# Patient Record
Sex: MALE | Race: WHITE | Employment: OTHER | URBAN - METROPOLITAN AREA
[De-identification: names, ages, dates, MRNs, and addresses within clinical notes are randomized per-mention and may not be internally consistent; named-entity substitution may affect disease eponyms.]

---

## 2018-02-01 ENCOUNTER — OFFICE VISIT (OUTPATIENT)
Dept: FAMILY MEDICINE CLINIC | Age: 33
End: 2018-02-01

## 2018-02-01 DIAGNOSIS — I16.0 HYPERTENSIVE URGENCY: Primary | ICD-10-CM

## 2018-02-01 DIAGNOSIS — H35.62 RETINAL HEMORRHAGE OF LEFT EYE: ICD-10-CM

## 2018-02-01 DIAGNOSIS — E66.01 OBESITY, MORBID (HCC): ICD-10-CM

## 2018-02-01 NOTE — PATIENT INSTRUCTIONS
High Blood Pressure: Care Instructions  Your Care Instructions    If your blood pressure is usually above 140/90, you have high blood pressure, or hypertension. That means the top number is 140 or higher or the bottom number is 90 or higher, or both. Despite what a lot of people think, high blood pressure usually doesn't cause headaches or make you feel dizzy or lightheaded. It usually has no symptoms. But it does increase your risk for heart attack, stroke, and kidney or eye damage. The higher your blood pressure, the more your risk increases. Your doctor will give you a goal for your blood pressure. Your goal will be based on your health and your age. An example of a goal is to keep your blood pressure below 140/90. Lifestyle changes, such as eating healthy and being active, are always important to help lower blood pressure. You might also take medicine to reach your blood pressure goal.  Follow-up care is a key part of your treatment and safety. Be sure to make and go to all appointments, and call your doctor if you are having problems. It's also a good idea to know your test results and keep a list of the medicines you take. How can you care for yourself at home? Medical treatment  · If you stop taking your medicine, your blood pressure will go back up. You may take one or more types of medicine to lower your blood pressure. Be safe with medicines. Take your medicine exactly as prescribed. Call your doctor if you think you are having a problem with your medicine. · Talk to your doctor before you start taking aspirin every day. Aspirin can help certain people lower their risk of a heart attack or stroke. But taking aspirin isn't right for everyone, because it can cause serious bleeding. · See your doctor regularly. You may need to see the doctor more often at first or until your blood pressure comes down.   · If you are taking blood pressure medicine, talk to your doctor before you take decongestants or anti-inflammatory medicine, such as ibuprofen. Some of these medicines can raise blood pressure. · Learn how to check your blood pressure at home. Lifestyle changes  · Stay at a healthy weight. This is especially important if you put on weight around the waist. Losing even 10 pounds can help you lower your blood pressure. · If your doctor recommends it, get more exercise. Walking is a good choice. Bit by bit, increase the amount you walk every day. Try for at least 30 minutes on most days of the week. You also may want to swim, bike, or do other activities. · Avoid or limit alcohol. Talk to your doctor about whether you can drink any alcohol. · Try to limit how much sodium you eat to less than 2,300 milligrams (mg) a day. Your doctor may ask you to try to eat less than 1,500 mg a day. · Eat plenty of fruits (such as bananas and oranges), vegetables, legumes, whole grains, and low-fat dairy products. · Lower the amount of saturated fat in your diet. Saturated fat is found in animal products such as milk, cheese, and meat. Limiting these foods may help you lose weight and also lower your risk for heart disease. · Do not smoke. Smoking increases your risk for heart attack and stroke. If you need help quitting, talk to your doctor about stop-smoking programs and medicines. These can increase your chances of quitting for good. When should you call for help? Call 911 anytime you think you may need emergency care. This may mean having symptoms that suggest that your blood pressure is causing a serious heart or blood vessel problem. Your blood pressure may be over 180/110. ? For example, call 911 if:  ? · You have symptoms of a heart attack. These may include:  ¨ Chest pain or pressure, or a strange feeling in the chest.  ¨ Sweating. ¨ Shortness of breath. ¨ Nausea or vomiting.   ¨ Pain, pressure, or a strange feeling in the back, neck, jaw, or upper belly or in one or both shoulders or arms.  ¨ Lightheadedness or sudden weakness. ¨ A fast or irregular heartbeat. ? · You have symptoms of a stroke. These may include:  ¨ Sudden numbness, tingling, weakness, or loss of movement in your face, arm, or leg, especially on only one side of your body. ¨ Sudden vision changes. ¨ Sudden trouble speaking. ¨ Sudden confusion or trouble understanding simple statements. ¨ Sudden problems with walking or balance. ¨ A sudden, severe headache that is different from past headaches. ? · You have severe back or belly pain. ?Do not wait until your blood pressure comes down on its own. Get help right away. ?Call your doctor now or seek immediate care if:  ? · Your blood pressure is much higher than normal (such as 180/110 or higher), but you don't have symptoms. ? · You think high blood pressure is causing symptoms, such as:  ¨ Severe headache. ¨ Blurry vision. ? Watch closely for changes in your health, and be sure to contact your doctor if:  ? · Your blood pressure measures 140/90 or higher at least 2 times. That means the top number is 140 or higher or the bottom number is 90 or higher, or both. ? · You think you may be having side effects from your blood pressure medicine. ? · Your blood pressure is usually normal, but it goes above normal at least 2 times. Where can you learn more? Go to http://tony-bertram.info/. Enter Q477 in the search box to learn more about \"High Blood Pressure: Care Instructions. \"  Current as of: September 21, 2016  Content Version: 11.4  © 5784-9582 [employer]. Care instructions adapted under license by [employer] (which disclaims liability or warranty for this information). If you have questions about a medical condition or this instruction, always ask your healthcare professional. [place] [address] any warranty or liability for your use of this information.

## 2018-02-01 NOTE — PROGRESS NOTES
Subjective:   Radha Diop is a 28 y.o. male here for an acute visit for    Chief Complaint   Patient presents with    New Patient     *Establish Care    Blood Pressure Check       HPI      Onset[de-identified] headache first started on Friday after waking up from a nap, at the same time noticed a \"spot\" in his left eye  Location: the headache is localized around his eyes  Duration: the headache has been intermittent, but the \"spot\" is always there   Characteristics: describes as \"just there\", rates 4-5/10  Aggravating: nothing   Reliving: nothing  Treatment: nothing  Pertinent PMH: obesety, positive snoreing, family hx of hypertension: father around 36 takes med, mom htn in her  52's  Pertinent negatives: chest pain/pressure, double or blurred vision, shortness of breath, difficulty breathing, waking up with a gasp when sleeping, headache upon awakening, weakness    ROS  As above, the rest are negative       Family History   Problem Relation Age of Onset    Hypertension Mother 48    Diabetes Father     Hypertension Father 36         Patient Active Problem List   Diagnosis Code    Obesity, morbid ([facility] E66.01    Hypertensive emergency I16.1    Hypertensive intracerebral hemorrhage ([facility] I61.9       No Known Allergies    Social History     Social History    Marital status:      Spouse name: N/A    Number of children: N/A    Years of education: N/A     Occupational History    Not on file.      Social History Main Topics    Smoking status: Never Smoker    Smokeless tobacco: Never Used    Alcohol use Yes      Comment: Occaisionally    Drug use: No    Sexual activity: Yes     Partners: Female     Other Topics Concern    Not on file     Social History Narrative    No narrative on file     Objective:     Vitals:    02/01/18 1447 02/01/18 1455 02/01/18 1510 02/01/18 1524   BP: (!) 207/159 (!) 196/150 (!) 194/130 (!) 180/140   Pulse: (!) 107 (!) 102     Resp: 17      Temp: 97.8 °F (36.6 °C)      TempSrc: Oral Weight: 308 lb (139.7 kg)      Height: 5' 8\" (1.727 m)         Body mass index is 46.83 kg/(m^2). Appearance: alert, well appearing, and in no distress, oriented to person, place, and time, overweight and acyanotic, in no respiratory distress, provides all information, no  used. ENT : Just came from opthalmology, pupils dilated bilaterally  Lungs are clear, good air entry, no wheezes, rhonchi or rales. S1 and S2 normal, no murmurs, regular rate and rhythm. Abdomen soft without tenderness, guarding, mass or organomegaly. No bruit. Extremities show no edema, normal peripheral pulses. Neurological is normal, no focal findings. Complete neuro examine performed  Head: normocephalic, atraumatic  Skin: no bruising, petechia, ecchymosis, trauma    Labwork and Ancillary Studies:    CBC w/Diff   No results found for: WBC, WBCLT, HGB, HGBP, PLT, HGBEXT, PLTEXT, HGBEXT, PLTEXT      Basic Metabolic Profile  No results found for: NA, K, CL, CO2, AGAP, GLU, BUN, CREA, BUCR, GFRAA, GFRNA, CA, GFRAA     Cholesterol  No results found for: CHOL, CHOLX, CHLST, CHOLV, HDL, LDL, LDLC, DLDLP, TGLX, TRIGL, TRIGP, CHHD, CHHDX       Assessment/Plan:    Diagnoses and all orders for this visit:    1. Hypertensive urgency  -     AMB POC EKG ROUTINE W/ 12 LEADS, INTER & REP  -I personally reviewed and interpreted the EKG, no ST/T wave changes, sinus tachycardia    2. Retinal hemorrhage of left eye  -found at the opthalmology office    3. Obesity, morbid ([address]    -In the setting of today's visit diet and exercise counseling was not done, however, Mr. Jerrell Grove recognizes the need to lose weight      ICD-10-CM ICD-9-CM    1. Hypertensive urgency I16.0 401.9 AMB POC EKG ROUTINE W/ 12 LEADS, INTER & REP   2. Retinal hemorrhage of left eye H35.62 362.81    3.  Obesity, morbid ([address] E66.01 278.01       Mr. Jerrell Grove comes to the office from the opthalmologic office where he was found to have retinal hemorrhaging which the opthalmologic attributed to hypertension. Mr. Chantale Pike was examined completley and a 12 lead EKG was done. I feel he is safe to transport himself to the ED at this time. Called the nearest ED, [facility] [place], and spoke with Dr. Shanti Blunt, informed him I was sending pt. over and gave a brief report. Mr. Chantale Pike was transported to the ED by his wife who came to pick him up within 5 minutes. Return to clinic if sxs persist, to ER if sxs worsen    Patient verbalized understanding to above instructions. AVS printed and given to pt. Mariama Simpson, ROBERTO-BC  [facility]   [address] [facility] [address]  [id number]

## 2018-02-01 NOTE — MR AVS SNAPSHOT
[facility] [place] [address] [facility] [address] [facility] [address] [id number] 
[phone] Patient: Jana Verdugo MRN: [id number] :1985 Visit Information Date & Time Provider Department Dept. Phone Encounter #  
 2018  2:45 PM Nikki Minna, [address] [place] [address] [phone] [id number] Follow-up Instructions Return in about 1 week (around 2018), or if symptoms worsen or fail to improve. Upcoming Health Maintenance Date Due DTaP/Tdap/Td series (1 - Tdap) 2006 Influenza Age 5 to Adult 2017 Allergies as of 2018  Review Complete On: 2018 By: Lizzeth Thurston LPN No Known Allergies Current Immunizations  Reviewed on 2018 No immunizations on file. Reviewed by Lizzeth Thurston LPN on 6389 at  3:70 PM  
You Were Diagnosed With   
  
 Codes Comments Hypertensive urgency    -  Primary ICD-10-CM: I16.0 ICD-9-CM: 401.9 Essential hypertension     ICD-10-CM: I10 
ICD-9-CM: 401.9 Vitals BP Pulse Temp Resp Height(growth percentile) Weight(growth percentile) (!) 196/150 (!) 102 97.8 °F (36.6 °C) (Oral) 17 5' 8\" (1.727 m) 308 lb (139.7 kg) BMI Smoking Status 46.83 kg/m2 Never Smoker Vitals History BMI and BSA Data Body Mass Index Body Surface Area  
 46.83 kg/m 2 2.59 m 2 Your Updated Medication List  
  
Notice  As of 2018  3:18 PM  
 You have not been prescribed any medications. We Performed the Following AMB POC EKG ROUTINE W/ 12 LEADS, INTER & REP [60426 CPT(R)] Follow-up Instructions Return in about 1 week (around 2018), or if symptoms worsen or fail to improve. Patient Instructions High Blood Pressure: Care Instructions Your Care Instructions If your blood pressure is usually above 140/90, you have high blood pressure, or hypertension. That means the top number is 140 or higher or the bottom number is 90 or higher, or both. Despite what a lot of people think, high blood pressure usually doesn't cause headaches or make you feel dizzy or lightheaded. It usually has no symptoms. But it does increase your risk for heart attack, stroke, and kidney or eye damage. The higher your blood pressure, the more your risk increases. Your doctor will give you a goal for your blood pressure. Your goal will be based on your health and your age. An example of a goal is to keep your blood pressure below 140/90. Lifestyle changes, such as eating healthy and being active, are always important to help lower blood pressure. You might also take medicine to reach your blood pressure goal. 
Follow-up care is a key part of your treatment and safety. Be sure to make and go to all appointments, and call your doctor if you are having problems. It's also a good idea to know your test results and keep a list of the medicines you take. How can you care for yourself at home? Medical treatment · If you stop taking your medicine, your blood pressure will go back up. You may take one or more types of medicine to lower your blood pressure. Be safe with medicines. Take your medicine exactly as prescribed. Call your doctor if you think you are having a problem with your medicine. · Talk to your doctor before you start taking aspirin every day. Aspirin can help certain people lower their risk of a heart attack or stroke. But taking aspirin isn't right for everyone, because it can cause serious bleeding. · See your doctor regularly. You may need to see the doctor more often at first or until your blood pressure comes down. · If you are taking blood pressure medicine, talk to your doctor before you take decongestants or anti-inflammatory medicine, such as ibuprofen. Some of these medicines can raise blood pressure. · Learn how to check your blood pressure at home. Lifestyle changes · Stay at a healthy weight. This is especially important if you put on weight around the waist. Losing even 10 pounds can help you lower your blood pressure. · If your doctor recommends it, get more exercise. Walking is a good choice. Bit by bit, increase the amount you walk every day. Try for at least 30 minutes on most days of the week. You also may want to swim, bike, or do other activities. · Avoid or limit alcohol. Talk to your doctor about whether you can drink any alcohol. · Try to limit how much sodium you eat to less than 2,300 milligrams (mg) a day. Your doctor may ask you to try to eat less than 1,500 mg a day. · Eat plenty of fruits (such as bananas and oranges), vegetables, legumes, whole grains, and low-fat dairy products. · Lower the amount of saturated fat in your diet. Saturated fat is found in animal products such as milk, cheese, and meat. Limiting these foods may help you lose weight and also lower your risk for heart disease. · Do not smoke. Smoking increases your risk for heart attack and stroke. If you need help quitting, talk to your doctor about stop-smoking programs and medicines. These can increase your chances of quitting for good. When should you call for help? Call 911 anytime you think you may need emergency care. This may mean having symptoms that suggest that your blood pressure is causing a serious heart or blood vessel problem. Your blood pressure may be over 180/110. ? For example, call 911 if: 
? · You have symptoms of a heart attack. These may include: ¨ Chest pain or pressure, or a strange feeling in the chest. 
¨ Sweating. ¨ Shortness of breath. ¨ Nausea or vomiting. ¨ Pain, pressure, or a strange feeling in the back, neck, jaw, or upper belly or in one or both shoulders or arms. ¨ Lightheadedness or sudden weakness. ¨ A fast or irregular heartbeat. ? · You have symptoms of a stroke. These may include: 
¨ Sudden numbness, tingling, weakness, or loss of movement in your face, arm, or leg, especially on only one side of your body. ¨ Sudden vision changes. ¨ Sudden trouble speaking. ¨ Sudden confusion or trouble understanding simple statements. ¨ Sudden problems with walking or balance. ¨ A sudden, severe headache that is different from past headaches. ? · You have severe back or belly pain. ?Do not wait until your blood pressure comes down on its own. Get help right away. ?Call your doctor now or seek immediate care if: 
? · Your blood pressure is much higher than normal (such as 180/110 or higher), but you don't have symptoms. ? · You think high blood pressure is causing symptoms, such as: ¨ Severe headache. ¨ Blurry vision. ? Watch closely for changes in your health, and be sure to contact your doctor if: 
? · Your blood pressure measures 140/90 or higher at least 2 times. That means the top number is 140 or higher or the bottom number is 90 or higher, or both. ? · You think you may be having side effects from your blood pressure medicine. ? · Your blood pressure is usually normal, but it goes above normal at least 2 times. Where can you learn more? Go to http://tony-bertram.info/. Enter X695 in the search box to learn more about \"High Blood Pressure: Care Instructions. \" Current as of: September 21, 2016 Content Version: 11.4 © 1447-0878 [employer]. Care instructions adapted under license by [employer] (which disclaims liability or warranty for this information). If you have questions about a medical condition or this instruction, always ask your healthcare professional. [place] [address] any warranty or liability for your use of this information. Introducing [facility]!    
 [address] introduces [employer] Virtual patient portal. Now you can access parts of your medical record, email your doctor's office, and request medication refills online. 1. In your internet browser, go to [email or web address] 2. Click on the First Time User? Click Here link in the Sign In box. You will see the New Member Sign Up page. 3. Enter your [employer] Access Code exactly as it appears below. You will not need to use this code after youve completed the sign-up process. If you do not sign up before the expiration date, you must request a new code. · [employer] Access Code: [email or web address] Expires: 5/2/2018  3:18 PM 
 
4. Enter the last four digits of your Social Security Number (xxxx) and Date of Birth (mm/dd/yyyy) as indicated and click Submit. You will be taken to the next sign-up page. 5. Create a [employer] ID. This will be your [employer] login ID and cannot be changed, so think of one that is secure and easy to remember. 6. Create a [employer] password. You can change your password at any time. 7. Enter your Password Reset Question and Answer. This can be used at a later time if you forget your password. 8. Enter your e-mail address. You will receive e-mail notification when new information is available in [address] 9. Click Sign Up. You can now view and download portions of your medical record. 10. Click the Download Summary menu link to download a portable copy of your medical information. If you have questions, please visit the Frequently Asked Questions section of the [employer] website. Remember, [employer] is NOT to be used for urgent needs. For medical emergencies, dial 911. Now available from your iPhone and Android! Please provide this summary of care documentation to your next provider. Your primary care clinician is listed as Capron Tanya. If you have any questions after today's visit, please call [phone].

## 2018-02-01 NOTE — PROGRESS NOTES
New patient patient presents to establish care. He reports that the ophthalmologist  downstairs was highly concerned about his blood pressure and recommended that he come up to our office to establish care.

## 2018-02-02 VITALS
SYSTOLIC BLOOD PRESSURE: 180 MMHG | RESPIRATION RATE: 17 BRPM | HEART RATE: 102 BPM | HEIGHT: 68 IN | DIASTOLIC BLOOD PRESSURE: 140 MMHG | BODY MASS INDEX: 46.68 KG/M2 | TEMPERATURE: 97.8 F | WEIGHT: 308 LBS

## 2018-02-02 PROBLEM — I61.9 HYPERTENSIVE INTRACEREBRAL HEMORRHAGE (HCC): Status: ACTIVE | Noted: 2018-02-01

## 2018-02-02 PROBLEM — I16.1 HYPERTENSIVE EMERGENCY: Status: ACTIVE | Noted: 2018-02-01

## 2018-02-07 ENCOUNTER — TELEPHONE (OUTPATIENT)
Dept: FAMILY MEDICINE CLINIC | Age: 33
End: 2018-02-07

## 2018-02-07 NOTE — TELEPHONE ENCOUNTER
Left message on voicemail:  Called to see if he would keep appointment on 2/8. Informed him I did want to follow up with an appointment since he was hospitalized.

## 2018-02-08 ENCOUNTER — OFFICE VISIT (OUTPATIENT)
Dept: FAMILY MEDICINE CLINIC | Age: 33
End: 2018-02-08

## 2018-02-08 VITALS
TEMPERATURE: 96.7 F | DIASTOLIC BLOOD PRESSURE: 109 MMHG | HEIGHT: 68 IN | SYSTOLIC BLOOD PRESSURE: 156 MMHG | RESPIRATION RATE: 17 BRPM | BODY MASS INDEX: 45.16 KG/M2 | HEART RATE: 76 BPM | WEIGHT: 298 LBS

## 2018-02-08 DIAGNOSIS — E66.01 OBESITY, MORBID (HCC): ICD-10-CM

## 2018-02-08 DIAGNOSIS — I61.9 HYPERTENSIVE INTRACEREBRAL HEMORRHAGE (HCC): ICD-10-CM

## 2018-02-08 DIAGNOSIS — Z86.69: ICD-10-CM

## 2018-02-08 DIAGNOSIS — E11.9 TYPE 2 DIABETES MELLITUS WITHOUT COMPLICATION, WITHOUT LONG-TERM CURRENT USE OF INSULIN (HCC): ICD-10-CM

## 2018-02-08 DIAGNOSIS — I10 ESSENTIAL HYPERTENSION: Primary | ICD-10-CM

## 2018-02-08 DIAGNOSIS — R51.9 ACUTE NONINTRACTABLE HEADACHE, UNSPECIFIED HEADACHE TYPE: ICD-10-CM

## 2018-02-08 DIAGNOSIS — I16.1 HYPERTENSIVE EMERGENCY: ICD-10-CM

## 2018-02-08 LAB — HBA1C MFR BLD HPLC: 5.9 %

## 2018-02-08 RX ORDER — LISINOPRIL 10 MG/1
10 TABLET ORAL DAILY
Qty: 30 TAB | Refills: 0 | Status: SHIPPED | OUTPATIENT
Start: 2018-02-08 | End: 2018-02-15 | Stop reason: SDUPTHER

## 2018-02-08 RX ORDER — AMLODIPINE BESYLATE 10 MG/1
10 TABLET ORAL DAILY
COMMUNITY
Start: 2018-02-06 | End: 2018-02-15 | Stop reason: SDUPTHER

## 2018-02-08 RX ORDER — METOPROLOL TARTRATE 50 MG/1
50 TABLET ORAL 3 TIMES DAILY
COMMUNITY
Start: 2018-02-05 | End: 2018-02-15 | Stop reason: SDUPTHER

## 2018-02-08 NOTE — PROGRESS NOTES
Tristian Hook is a 28 y.o. male and presents with Hospital Follow Up (*Patient ICU for three days)       Subjective:    Discharged from the hospital on 2/5/18. States he feels good. Has been taking his medications as prescribed and monitoring his blood pressure at home. Feels the Lopressor wears off prior to the eight hour time frame, states at about the six hour keisha his blood pressure goes up. States he is motivated to get into shape, exercise and lose weight. ROS:  Constitutional: No recent weight change. No weakness/fatigue. No fever or chills   Skin: No rashes, change in nails/hair, itching   HENT: No HA, dizziness. No hearing loss/tinnitus. No nasal congestion/discharge. Eyes: No change in vision, double/blurred vision or eye pain/redness. Cardiovascular: No CP/palpitations. No MCCANN/orthopnea/PND. Respiratory: No cough/sputum, dyspnea, wheezing. Neurological: No seizures/numbness/weakness. No paresthesias. Psychiatric:  No depression, anxiety. The problem list was updated as a part of today's visit. Patient Active Problem List   Diagnosis Code    Obesity, morbid ([facility] E66.01    Hypertensive intracerebral hemorrhage ([facility] [facility] I61.9    Hypertension I10    Headache R51    Hx of retinal hemorrhage Z86.69       The PSH, FH were reviewed. SH:  Social History   Substance Use Topics    Smoking status: Never Smoker    Smokeless tobacco: Never Used    Alcohol use Yes      Comment: Occaisionally         Medications/Allergies:  No current outpatient prescriptions on file prior to visit. No current facility-administered medications on file prior to visit. No Known Allergies    Objective:  Visit Vitals    BP (!) 156/109    Pulse 76    Temp 96.7 °F (35.9 °C) (Oral)    Resp 17    Ht 5' 8\" (1.727 m)    Wt 298 lb (135.2 kg)    BMI 45.31 kg/m2    Body mass index is 45.31 kg/(m^2).       Constitutional: Well developed, nourished, no distress, alert, obese habitus, dressed appropriately for weather, good historian, no  used   Eyes: Conjunctiva normal. PERRL. CV: S1, S2.  RRR. No murmurs/rubs. No thrills palpated. Pulm: No abnormalities on inspection. Clear to auscultation bilaterally. No wheezing/rhonchi. Normal effort. Neuro: A/O x 3.  CN 2-12 intact. No focal motor or sensory deficits. Speech normal.   Skin: No lesions/rashes on inspection. Psych: Appropriate affect, judgement and insight. Short-term memory intact. Labwork and Ancillary Studies:    Labs Drawn in [address]   02/05/18  0318   GLUCOSE 102*   BUN 13   CALCIUM 8.8   CREAT 0.9      POTASSIUM 3.8   CHLORIDE 101   CO2 23     Recent Labs   02/05/18  0318   WBC 14.2*   RBC 5.28   HEMOGLOBIN 15.0   HCT 44.3   MCV 84   MCH 28   MCHC 34   PLATELET 910   RDW 01.1     TSH 5.75  Free T4 1.3    Hospitalization Summary  2/1/18 - 2/5/18  Icu x 3 days on Cardene drip    CT scan of Head: Supratentorial, right caudate parenchymal hemorrhage, extimated volume 3cm, no mass effect or midline shift, unchanged throughout hospitalization    CT chest w/ contrast: Hyperinflation of lungs with marked elevation of right hemidiaphragm and right basilar atelectasis, otherwise unremarkable    2D echochardiogram: Negative bubble study, mild concentric left ventricular hypertrophy, normal global left ventricular systolic function , visually estimated EF 60%, no other abnormalities    CTA head: no cause identified for right caudate hemorrhage, probably hypertensive hemorrhage    CTA neck: all vessels patent    MR head w/wo contrast: focal acute hemorrhage in right caudothalamic angle area with surrounding mild edema, mild chronic microvascular ischemic changes in bilateral white matter, likely secondary to hypertension    ECG: normal sinus rhythm      Assessment/Plan:    Diagnoses and all orders for this visit:    1. Essential hypertension  -     HEMOGLOBIN A1C WITH EAG;  Future  -     lisinopril (PRINIVIL, ZESTRIL) 10 mg tablet; Take 1 Tab by mouth daily.  -     REFERRAL TO WEIGHT LOSS  -     AMB POC HEMOGLOBIN A1C    -Blood pressure still elevated 156/109  -Avoid rapid decrease in blood pressure, goal at this time is less than 160/90  -Current regimen from hospital: Amlodipine 10mg daily, Lopressor 50mg Q8H  -Add lisinopril and try to taper off Lopressor    2. Type 2 diabetes mellitus without complication, without long-term current use of insulin (HCC)  -     HEMOGLOBIN A1C WITH EAG; Future  -     lisinopril (PRINIVIL, ZESTRIL) 10 mg tablet; Take 1 Tab by mouth daily.  -     REFERRAL TO WEIGHT LOSS  -     AMB POC HEMOGLOBIN A1C    Several of his glucose checks in the hospital were elevated. Check A1c: 5.9    3. Obesity, morbid ([address]  -     HEMOGLOBIN A1C WITH EAG; Future  -     lisinopril (PRINIVIL, ZESTRIL) 10 mg tablet; Take 1 Tab by mouth daily.  -     REFERRAL TO WEIGHT LOSS  -     AMB POC HEMOGLOBIN A1C    Very motivated to lose weight. Referral to weight loss program, he is not sure if he would like to go this route but is open to getting more information. States [facility]  States he received much education in the hospital on diet changes and weight loss    4. Hypertensive emergency  Assessment & Plan:  Stable, based on history, physical exam and review of pertinent labs, studies and medications; meds reconciled; changes to treatment plan as per orders. 5. Hypertensive intracerebral hemorrhage (HCC)  Assessment & Plan:  Stable, based on history, physical exam and review of pertinent labs, studies and medications; meds reconciled; continue current treatment plan. Key Neurological Meds     The patient is on no neurologic meds. See hospitalization summary above. 6. Acute nonintractable headache, unspecified headache type    Resolved with blood pressure control    7. Hx of retinal hemorrhage    Related to elevated hypertension. Stable at this time. Health Maintenance:   Health Maintenance   Topic Date Due    DTaP/Tdap/Td series (1 - Tdap) 12/29/2006    Influenza Age 5 to Adult  08/01/2017       No orders of the defined types were placed in this encounter. All diagnosis have been discussed with the patient and all of the patient's questions have been answered. An After Visit Summary was printed and given to the patient. Follow-up Disposition:  Return in about 1 week (around 2/15/2018), or if symptoms worsen or fail to improve. [facility] Lalo, AGNP-BC  [address] [facility] [address]  [id number]

## 2018-02-08 NOTE — MR AVS SNAPSHOT
[facility] [place] [address] [facility] [address] [facility] [address] [id number] 
[phone] Patient: Yuriy Sharma MRN: [id number] :1985 Visit Information Date & Time Provider Department Dept. Phone Encounter #  
 2018  9:00 AM Stacie Larkin, [address] [facility] [phone] [id number] Follow-up Instructions Return in about 1 week (around 2/15/2018), or if symptoms worsen or fail to improve. Upcoming Health Maintenance Date Due DTaP/Tdap/Td series (1 - Tdap) 2006 Influenza Age 5 to Adult 2017 Allergies as of 2018  Review Complete On: 2018 By: Samara Hart LPN No Known Allergies Current Immunizations  Reviewed on 2018 No immunizations on file. Reviewed by Samara Hart LPN on 4/3/6764 at  3:59 AM  
You Were Diagnosed With   
  
 Codes Comments Essential hypertension    -  Primary ICD-10-CM: I10 
ICD-9-CM: 401.9 Type 2 diabetes mellitus without complication, without long-term current use of insulin (HCC)     ICD-10-CM: E11.9 ICD-9-CM: 250.00 Obesity, morbid ([facility] [address]     ICD-10-CM: E66.01 
ICD-9-CM: 278.01 Vitals BP Pulse Temp Resp Height(growth percentile) Weight(growth percentile) (!) 156/109 76 96.7 °F (35.9 °C) (Oral) 17 5' 8\" (1.727 m) 298 lb (135.2 kg) BMI Smoking Status 45.31 kg/m2 Never Smoker Vitals History BMI and BSA Data Body Mass Index Body Surface Area  
 45.31 kg/m 2 2.55 m 2 Preferred Pharmacy Pharmacy Name Phone Jan [address] [place] [address] Michael Robbins Your Updated Medication List  
  
   
This list is accurate as of: 18 10:03 AM.  Always use your most recent med list. amLODIPine 10 mg tablet Commonly known as:  Kathryn Talley Take 10 mg by mouth daily. lisinopril 10 mg tablet Commonly known as:  Madonna Cliche Take 1 Tab by mouth daily. metoprolol tartrate 50 mg tablet Commonly known as:  LOPRESSOR Take 50 mg by mouth three (3) times daily. Prescriptions Sent to Pharmacy Refills  
 lisinopril (PRINIVIL, ZESTRIL) 10 mg tablet 0 Sig: Take 1 Tab by mouth daily. Class: Normal  
 Pharmacy: [facility] [address] [place] [address] #: [phone] Route: Oral  
  
We Performed the Following REFERRAL TO WEIGHT LOSS [YDX830 Custom] Follow-up Instructions Return in about 1 week (around 2/15/2018), or if symptoms worsen or fail to improve. To-Do List   
 02/08/2018 Lab:  HEMOGLOBIN A1C WITH EAG Referral Information Referral ID Referred By Referred To  
  
 [id number] [facility], [address] 1938, [address] [facility] [address] Frankie Pickering [address] Phone: [phone] Fax: [phone] Visits Status Start Date End Date 1 New Request 2/8/18 2/8/19 If your referral has a status of pending review or denied, additional information will be sent to support the outcome of this decision. Patient Instructions Start Lisinopril Take b/p two times a day And as needed with symptoms Keep log Weight loss Goal blood pressure for now is less than 160/90 Learning About ACE Inhibitors and ARBs for Diabetes Introduction ACE inhibitors and ARBs are medicines used to control blood pressure. They allow blood vessels to relax and open up. This lowers your blood pressure. When you have diabetes, taking an ACE inhibitor or ARB can help to: · Treat high blood pressure. Your risk of problems from diabetes goes up when you have high blood pressure. · Prevent or slow kidney damage. Diabetes can damage the blood vessels in the kidneys. High blood pressure can damage the kidneys, too. · Lower the risks of stroke and heart attack. Your risks go up when you have high blood pressure, heart disease, or both. An ACE inhibitor or ARB is a good choice for people with diabetes. Unlike some medicines, these don't affect blood sugar levels. Examples ACE inhibitors include: · Benazepril. · Lisinopril. · Ramipril. ARBs include: · Irbesartan. · Losartan. · Telmisartan. Possible side effects All medicines can cause side effects. Some side effects of ACE inhibitors include: 
· Low blood pressure. You may feel dizzy and weak. · A cough. · High potassium levels. · An allergic reaction of the skin. Symptoms may range from mild swelling to painful welts. Some side effects of ARBs include: · Diarrhea. · High potassium levels. · Sinus problems. · Stomach problems. You may have other side effects or reactions not listed here. Check the information that comes with your medicine. What to know about taking this medicine · Be safe with medicines. Take your medicines exactly as prescribed. Call your doctor if you think you are having a problem with your medicine. · Before starting an ACE inhibitor or ARB, tell your doctor if you: ¨ Use a salt substitute. ¨ Take diuretics or potassium tablets. · These medicines are not safe for pregnancy. If you are pregnant or planning to be, talk to your doctor about a safe blood pressure medicine. · ACE inhibitors can cause a dry cough. If the cough is bad, talk to your doctor. Switching to an ARB is likely to help. · Taking some medicines together can cause problems. Tell your doctor or pharmacist all the medicines you take. This includes over-the-counter medicines, vitamins, herbal products, and supplements. · You may need regular blood and urine tests. Where can you learn more? Go to http://tony-bertram.info/. Enter M316 in the search box to learn more about \"Learning About ACE Inhibitors and ARBs for Diabetes. \" 
 Current as of: March 13, 2017 Content Version: 11.4 © 2917-0216 [employer]. Care instructions adapted under license by [employer] (which disclaims liability or warranty for this information). If you have questions about a medical condition or this instruction, always ask your healthcare professional. [address] any warranty or liability for your use of this information. High Blood Pressure: Care Instructions Your Care Instructions If your blood pressure is usually above 140/90, you have high blood pressure, or hypertension. That means the top number is 140 or higher or the bottom number is 90 or higher, or both. Despite what a lot of people think, high blood pressure usually doesn't cause headaches or make you feel dizzy or lightheaded. It usually has no symptoms. But it does increase your risk for heart attack, stroke, and kidney or eye damage. The higher your blood pressure, the more your risk increases. Your doctor will give you a goal for your blood pressure. Your goal will be based on your health and your age. An example of a goal is to keep your blood pressure below 140/90. Lifestyle changes, such as eating healthy and being active, are always important to help lower blood pressure. You might also take medicine to reach your blood pressure goal. 
Follow-up care is a key part of your treatment and safety. Be sure to make and go to all appointments, and call your doctor if you are having problems. It's also a good idea to know your test results and keep a list of the medicines you take. How can you care for yourself at home? Medical treatment · If you stop taking your medicine, your blood pressure will go back up. You may take one or more types of medicine to lower your blood pressure. Be safe with medicines. Take your medicine exactly as prescribed. Call your doctor if you think you are having a problem with your medicine. · Talk to your doctor before you start taking aspirin every day. Aspirin can help certain people lower their risk of a heart attack or stroke. But taking aspirin isn't right for everyone, because it can cause serious bleeding. · See your doctor regularly. You may need to see the doctor more often at first or until your blood pressure comes down. · If you are taking blood pressure medicine, talk to your doctor before you take decongestants or anti-inflammatory medicine, such as ibuprofen. Some of these medicines can raise blood pressure. · Learn how to check your blood pressure at home. Lifestyle changes · Stay at a healthy weight. This is especially important if you put on weight around the waist. Losing even 10 pounds can help you lower your blood pressure. · If your doctor recommends it, get more exercise. Walking is a good choice. Bit by bit, increase the amount you walk every day. Try for at least 30 minutes on most days of the week. You also may want to swim, bike, or do other activities. · Avoid or limit alcohol. Talk to your doctor about whether you can drink any alcohol. · Try to limit how much sodium you eat to less than 2,300 milligrams (mg) a day. Your doctor may ask you to try to eat less than 1,500 mg a day. · Eat plenty of fruits (such as bananas and oranges), vegetables, legumes, whole grains, and low-fat dairy products. · Lower the amount of saturated fat in your diet. Saturated fat is found in animal products such as milk, cheese, and meat. Limiting these foods may help you lose weight and also lower your risk for heart disease. · Do not smoke. Smoking increases your risk for heart attack and stroke. If you need help quitting, talk to your doctor about stop-smoking programs and medicines. These can increase your chances of quitting for good. When should you call for help? Call 911 anytime you think you may need emergency care.  This may mean having symptoms that suggest that your blood pressure is causing a serious heart or blood vessel problem. Your blood pressure may be over 180/110. ? For example, call 911 if: 
? · You have symptoms of a heart attack. These may include: ¨ Chest pain or pressure, or a strange feeling in the chest. 
¨ Sweating. ¨ Shortness of breath. ¨ Nausea or vomiting. ¨ Pain, pressure, or a strange feeling in the back, neck, jaw, or upper belly or in one or both shoulders or arms. ¨ Lightheadedness or sudden weakness. ¨ A fast or irregular heartbeat. ? · You have symptoms of a stroke. These may include: 
¨ Sudden numbness, tingling, weakness, or loss of movement in your face, arm, or leg, especially on only one side of your body. ¨ Sudden vision changes. ¨ Sudden trouble speaking. ¨ Sudden confusion or trouble understanding simple statements. ¨ Sudden problems with walking or balance. ¨ A sudden, severe headache that is different from past headaches. ? · You have severe back or belly pain. ?Do not wait until your blood pressure comes down on its own. Get help right away. ?Call your doctor now or seek immediate care if: 
? · Your blood pressure is much higher than normal (such as 180/110 or higher), but you don't have symptoms. ? · You think high blood pressure is causing symptoms, such as: ¨ Severe headache. ¨ Blurry vision. ? Watch closely for changes in your health, and be sure to contact your doctor if: 
? · Your blood pressure measures 140/90 or higher at least 2 times. That means the top number is 140 or higher or the bottom number is 90 or higher, or both. ? · You think you may be having side effects from your blood pressure medicine. ? · Your blood pressure is usually normal, but it goes above normal at least 2 times. Where can you learn more? Go to http://tony-bertram.info/. Enter I588 in the search box to learn more about \"High Blood Pressure: Care Instructions. \" 
 Current as of: September 21, 2016 Content Version: 11.4 © 4053-2891 Healthwise, Shop[employer]. Care instructions adapted under license by [employer] (which disclaims liability or warranty for this information). If you have questions about a medical condition or this instruction, always ask your healthcare professional. [address] any warranty or liability for your use of this information. Introducing [facility] & HE[facility]! Kristal Bower introduces [employer] patient portal. Now you can access parts of your medical record, email your doctor's office, and request medication refills online. 1. In your internet browser, go to [email or web address] 2. Click on the First Time User? Click Here link in the Sign In box. You will see the New Member Sign Up page. 3. Enter your [employer] Access Code exactly as it appears below. You will not need to use this code after youve completed the sign-up process. If you do not sign up before the expiration date, you must request a new code. · [employer] Access Code: [email or web address] Expires: 5/2/2018  3:18 PM 
 
4. Enter the last four digits of your Social Security Number (xxxx) and Date of Birth (mm/dd/yyyy) as indicated and click Submit. You will be taken to the next sign-up page. 5. Create a [employer] ID. This will be your [employer] login ID and cannot be changed, so think of one that is secure and easy to remember. 6. Create a [employer] password. You can change your password at any time. 7. Enter your Password Reset Question and Answer. This can be used at a later time if you forget your password. 8. Enter your e-mail address. You will receive e-mail notification when new information is available in [address] 9. Click Sign Up. You can now view and download portions of your medical record. 10. Click the Download Summary menu link to download a portable copy of your medical information. If you have questions, please visit the Frequently Asked Questions section of the [employer] website. Remember, [employer] is NOT to be used for urgent needs. For medical emergencies, dial 911. Now available from your iPhone and Android! Please provide this summary of care documentation to your next provider. Your primary care clinician is listed as Brianna Pastrana. If you have any questions after today's visit, please call [phone].

## 2018-02-08 NOTE — ASSESSMENT & PLAN NOTE
Stable, based on history, physical exam and review of pertinent labs, studies and medications; meds reconciled; continue current treatment plan. Key Neurological Meds     The patient is on no neurologic meds.         No results found for: WBC, WBCT, WBCPOC, HGB, HGBPOC, HCT, HCTPOC, PLT, PLTPOC, INR, PTP, PTINR, PTEXT, CREA, CREAPOC, CREATEXT, BUN, IBUN, BUNPOC, HGBEXT, HCTEXT, PLTEXT, PTEXT

## 2018-02-08 NOTE — PROGRESS NOTES
1. Have you been to the ER, urgent care clinic since your last visit? Hospitalized since your last visit? Yes When: February the First Where: [facility] Reason for visit: Hypertension    2. Have you seen or consulted any other health care providers outside of the [address] [place] [address] since your last visit? Include any pap smears or colon screening. No       Patient presents for one week hospital follow up.

## 2018-02-08 NOTE — PATIENT INSTRUCTIONS
Start Lisinopril  Take b/p two times a day  And as needed with symptoms  Keep log  Weight loss  Goal blood pressure for now is less than 160/90     Learning About ACE Inhibitors and ARBs for Diabetes  Introduction    ACE inhibitors and ARBs are medicines used to control blood pressure. They allow blood vessels to relax and open up. This lowers your blood pressure. When you have diabetes, taking an ACE inhibitor or ARB can help to:  · Treat high blood pressure. Your risk of problems from diabetes goes up when you have high blood pressure. · Prevent or slow kidney damage. Diabetes can damage the blood vessels in the kidneys. High blood pressure can damage the kidneys, too. · Lower the risks of stroke and heart attack. Your risks go up when you have high blood pressure, heart disease, or both. An ACE inhibitor or ARB is a good choice for people with diabetes. Unlike some medicines, these don't affect blood sugar levels. Examples  ACE inhibitors include:  · Benazepril. · Lisinopril. · Ramipril. ARBs include:  · Irbesartan. · Losartan. · Telmisartan. Possible side effects  All medicines can cause side effects. Some side effects of ACE inhibitors include:  · Low blood pressure. You may feel dizzy and weak. · A cough. · High potassium levels. · An allergic reaction of the skin. Symptoms may range from mild swelling to painful welts. Some side effects of ARBs include:  · Diarrhea. · High potassium levels. · Sinus problems. · Stomach problems. You may have other side effects or reactions not listed here. Check the information that comes with your medicine. What to know about taking this medicine  · Be safe with medicines. Take your medicines exactly as prescribed. Call your doctor if you think you are having a problem with your medicine. · Before starting an ACE inhibitor or ARB, tell your doctor if you:  ¨ Use a salt substitute. ¨ Take diuretics or potassium tablets.   · These medicines are not safe for pregnancy. If you are pregnant or planning to be, talk to your doctor about a safe blood pressure medicine. · ACE inhibitors can cause a dry cough. If the cough is bad, talk to your doctor. Switching to an ARB is likely to help. · Taking some medicines together can cause problems. Tell your doctor or pharmacist all the medicines you take. This includes over-the-counter medicines, vitamins, herbal products, and supplements. · You may need regular blood and urine tests. Where can you learn more? Go to http://tony-bertram.info/. Enter M316 in the search box to learn more about \"Learning About ACE Inhibitors and ARBs for Diabetes. \"  Current as of: March 13, 2017  Content Version: 11.4  © 9454-5301 [employer]. Care instructions adapted under license by [employer] (which disclaims liability or warranty for this information). If you have questions about a medical condition or this instruction, always ask your healthcare professional. [place] [address] any warranty or liability for your use of this information. High Blood Pressure: Care Instructions  Your Care Instructions    If your blood pressure is usually above 140/90, you have high blood pressure, or hypertension. That means the top number is 140 or higher or the bottom number is 90 or higher, or both. Despite what a lot of people think, high blood pressure usually doesn't cause headaches or make you feel dizzy or lightheaded. It usually has no symptoms. But it does increase your risk for heart attack, stroke, and kidney or eye damage. The higher your blood pressure, the more your risk increases. Your doctor will give you a goal for your blood pressure. Your goal will be based on your health and your age. An example of a goal is to keep your blood pressure below 140/90. Lifestyle changes, such as eating healthy and being active, are always important to help lower blood pressure.  You might also take medicine to reach your blood pressure goal.  Follow-up care is a key part of your treatment and safety. Be sure to make and go to all appointments, and call your doctor if you are having problems. It's also a good idea to know your test results and keep a list of the medicines you take. How can you care for yourself at home? Medical treatment  · If you stop taking your medicine, your blood pressure will go back up. You may take one or more types of medicine to lower your blood pressure. Be safe with medicines. Take your medicine exactly as prescribed. Call your doctor if you think you are having a problem with your medicine. · Talk to your doctor before you start taking aspirin every day. Aspirin can help certain people lower their risk of a heart attack or stroke. But taking aspirin isn't right for everyone, because it can cause serious bleeding. · See your doctor regularly. You may need to see the doctor more often at first or until your blood pressure comes down. · If you are taking blood pressure medicine, talk to your doctor before you take decongestants or anti-inflammatory medicine, such as ibuprofen. Some of these medicines can raise blood pressure. · Learn how to check your blood pressure at home. Lifestyle changes  · Stay at a healthy weight. This is especially important if you put on weight around the waist. Losing even 10 pounds can help you lower your blood pressure. · If your doctor recommends it, get more exercise. Walking is a good choice. Bit by bit, increase the amount you walk every day. Try for at least 30 minutes on most days of the week. You also may want to swim, bike, or do other activities. · Avoid or limit alcohol. Talk to your doctor about whether you can drink any alcohol. · Try to limit how much sodium you eat to less than 2,300 milligrams (mg) a day. Your doctor may ask you to try to eat less than 1,500 mg a day.   · Eat plenty of fruits (such as bananas and oranges), vegetables, legumes, whole grains, and low-fat dairy products. · Lower the amount of saturated fat in your diet. Saturated fat is found in animal products such as milk, cheese, and meat. Limiting these foods may help you lose weight and also lower your risk for heart disease. · Do not smoke. Smoking increases your risk for heart attack and stroke. If you need help quitting, talk to your doctor about stop-smoking programs and medicines. These can increase your chances of quitting for good. When should you call for help? Call 911 anytime you think you may need emergency care. This may mean having symptoms that suggest that your blood pressure is causing a serious heart or blood vessel problem. Your blood pressure may be over 180/110. ? For example, call 911 if:  ? · You have symptoms of a heart attack. These may include:  ¨ Chest pain or pressure, or a strange feeling in the chest.  ¨ Sweating. ¨ Shortness of breath. ¨ Nausea or vomiting. ¨ Pain, pressure, or a strange feeling in the back, neck, jaw, or upper belly or in one or both shoulders or arms. ¨ Lightheadedness or sudden weakness. ¨ A fast or irregular heartbeat. ? · You have symptoms of a stroke. These may include:  ¨ Sudden numbness, tingling, weakness, or loss of movement in your face, arm, or leg, especially on only one side of your body. ¨ Sudden vision changes. ¨ Sudden trouble speaking. ¨ Sudden confusion or trouble understanding simple statements. ¨ Sudden problems with walking or balance. ¨ A sudden, severe headache that is different from past headaches. ? · You have severe back or belly pain. ?Do not wait until your blood pressure comes down on its own. Get help right away. ?Call your doctor now or seek immediate care if:  ? · Your blood pressure is much higher than normal (such as 180/110 or higher), but you don't have symptoms. ? · You think high blood pressure is causing symptoms, such as:  ¨ Severe headache.   ¨ Blurry vision. ? Watch closely for changes in your health, and be sure to contact your doctor if:  ? · Your blood pressure measures 140/90 or higher at least 2 times. That means the top number is 140 or higher or the bottom number is 90 or higher, or both. ? · You think you may be having side effects from your blood pressure medicine. ? · Your blood pressure is usually normal, but it goes above normal at least 2 times. Where can you learn more? Go to http://tony-bertram.info/. Enter V618 in the search box to learn more about \"High Blood Pressure: Care Instructions. \"  Current as of: September 21, 2016  Content Version: 11.4  © 1618-9866 [employer]. Care instructions adapted under license by [employer] (which disclaims liability or warranty for this information). If you have questions about a medical condition or this instruction, always ask your healthcare professional. [address] any warranty or liability for your use of this information.

## 2018-02-15 ENCOUNTER — OFFICE VISIT (OUTPATIENT)
Dept: FAMILY MEDICINE CLINIC | Age: 33
End: 2018-02-15

## 2018-02-15 VITALS
BODY MASS INDEX: 45.47 KG/M2 | WEIGHT: 300 LBS | DIASTOLIC BLOOD PRESSURE: 87 MMHG | SYSTOLIC BLOOD PRESSURE: 134 MMHG | OXYGEN SATURATION: 97 % | RESPIRATION RATE: 17 BRPM | HEART RATE: 69 BPM | HEIGHT: 68 IN | TEMPERATURE: 97.9 F

## 2018-02-15 DIAGNOSIS — I10 ESSENTIAL HYPERTENSION: Primary | ICD-10-CM

## 2018-02-15 RX ORDER — METOPROLOL TARTRATE 50 MG/1
50 TABLET ORAL 3 TIMES DAILY
Qty: 90 TAB | Refills: 2 | Status: SHIPPED | OUTPATIENT
Start: 2018-02-15 | End: 2018-05-23 | Stop reason: SDUPTHER

## 2018-02-15 RX ORDER — LISINOPRIL 10 MG/1
10 TABLET ORAL DAILY
Qty: 30 TAB | Refills: 2 | Status: SHIPPED | OUTPATIENT
Start: 2018-02-15 | End: 2018-05-27 | Stop reason: SDUPTHER

## 2018-02-15 RX ORDER — AMLODIPINE BESYLATE 10 MG/1
10 TABLET ORAL DAILY
Qty: 30 TAB | Refills: 2 | Status: SHIPPED | OUTPATIENT
Start: 2018-02-15 | End: 2018-05-23 | Stop reason: SDUPTHER

## 2018-02-15 NOTE — MR AVS SNAPSHOT
Coral Carrillo [place] [address] [facility] [address] Merg[facility] with [facility] [id number] 
[phone] Patient: Reyna Douglas MRN: [id number] :1985 Visit Information Date & Time Provider Department Dept. Phone Encounter #  
 2/15/2018  8:30 AM Rod Lemos NP  [address] [facility] [phone] [id number] Follow-up Instructions Return in about 3 months (around 5/15/2018), or if symptoms worsen or fail to improve, for hypertension. Upcoming Health Maintenance Date Due DTaP/Tdap/Td series (1 - Tdap) 2006 Influenza Age 5 to Adult 2017 Allergies as of 2/15/2018  Review Complete On: 2/15/2018 By: Rod Lemos NP No Known Allergies Current Immunizations  Reviewed on 2018 No immunizations on file. Not reviewed this visit You Were Diagnosed With   
  
 Codes Comments Essential hypertension    -  Primary ICD-10-CM: I10 
ICD-9-CM: 401.9 Vitals BP Pulse Temp Resp Height(growth percentile) Weight(growth percentile) 134/87 (BP 1 Location: Right arm, BP Patient Position: Sitting) 69 97.9 °F (36.6 °C) (Oral) 17 5' 8\" (1.727 m) 300 lb (136.1 kg) SpO2 BMI Smoking Status 97% 45.61 kg/m2 Never Smoker BMI and BSA Data Body Mass Index Body Surface Area  
 45.61 kg/m 2 2.56 m 2 Preferred Pharmacy Pharmacy Name Phone Jan [address] [place] [address] Michael Robbins Your Updated Medication List  
  
   
This list is accurate as of: 2/15/18  8:53 AM.  Always use your most recent med list. amLODIPine 10 mg tablet Commonly known as:  Gómez Nearing Take 1 Tab by mouth daily. lisinopril 10 mg tablet Commonly known as:  Jaz Stratford Take 1 Tab by mouth daily. metoprolol tartrate 50 mg tablet Commonly known as:  LOPRESSOR Take 1 Tab by mouth three (3) times daily. Prescriptions Sent to Pharmacy Refills  
 amLODIPine (NORVASC) 10 mg tablet 2 Sig: Take 1 Tab by mouth daily. Class: Normal  
 Pharmacy: [facility] [address] [place] [address] #: [phone] Route: Oral  
 lisinopril (PRINIVIL, ZESTRIL) 10 mg tablet 2 Sig: Take 1 Tab by mouth daily. Class: Normal  
 Pharmacy: [facility] [address] #: [phone] Route: Oral  
 metoprolol tartrate (LOPRESSOR) 50 mg tablet 2 Sig: Take 1 Tab by mouth three (3) times daily. Class: Normal  
 Pharmacy: [facility] [address] #: [phone] Route: Oral  
  
Follow-up Instructions Return in about 3 months (around 5/15/2018), or if symptoms worsen or fail to improve, for hypertension. Introducing [facility]! Elif Ruelas introduces GateMe patient portal. Now you can access parts of your medical record, email your doctor's office, and request medication refills online. 1. In your internet browser, go to [email or web address] 2. Click on the First Time User? Click Here link in the Sign In box. You will see the New Member Sign Up page. 3. Enter your GateMe Access Code exactly as it appears below. You will not need to use this code after youve completed the sign-up process. If you do not sign up before the expiration date, you must request a new code. · GateMe Access Code: [email or web address] Expires: 5/2/2018  3:18 PM 
 
4. Enter the last four digits of your Social Security Number (xxxx) and Date of Birth (mm/dd/yyyy) as indicated and click Submit. You will be taken to the next sign-up page. 5. Create a GateMe ID. This will be your GateMe login ID and cannot be changed, so think of one that is secure and easy to remember. 6. Create a SynergEyes password. You can change your password at any time. 7. Enter your Password Reset Question and Answer. This can be used at a later time if you forget your password. 8. Enter your e-mail address. You will receive e-mail notification when new information is available in [address] 9. Click Sign Up. You can now view and download portions of your medical record. 10. Click the Download Summary menu link to download a portable copy of your medical information. If you have questions, please visit the Frequently Asked Questions section of the SynergEyes website. Remember, SynergEyes is NOT to be used for urgent needs. For medical emergencies, dial 911. Now available from your iPhone and Android! Please provide this summary of care documentation to your next provider. Your primary care clinician is listed as Evertt Freeze. If you have any questions after today's visit, please call [phone].

## 2018-02-15 NOTE — PROGRESS NOTES
Vin Huertas is a 28 y.o. male and presents with Follow-up (1 week)       Subjective:    Hypertension:   Patient reports taking medications as instructed. yes   No medication side effects noted. yes  Headache upon wakening. yes   Home BP monitoring in range of 527'A systolic. ROS:  Constitutional: No recent weight change. No weakness/fatigue. No fever or chills   HENT: No HA, dizziness. No hearing loss/tinnitus. No nasal congestion/discharge. Eyes: No change in vision, double/blurred vision or eye pain/redness. Still sees a \"spot\" out of left eye   Cardiovascular: No CP/palpitations. No MCCANN/orthopnea/PND. Psychiatric:  No depression, anxiety. The problem list was updated as a part of today's visit. Patient Active Problem List   Diagnosis Code    Obesity, morbid ([facility] E66.01    Hypertensive intracerebral hemorrhage ([facility] I61.9    Hypertension I10    Headache R51    Hx of retinal hemorrhage Z86.69       The PSH, FH were reviewed. SH:  Social History   Substance Use Topics    Smoking status: Never Smoker    Smokeless tobacco: Never Used    Alcohol use Yes      Comment: Occaisionally         Medications/Allergies:  No current outpatient prescriptions on file prior to visit. No current facility-administered medications on file prior to visit. No Known Allergies    Objective:  Visit Vitals    /87 (BP 1 Location: Right arm, BP Patient Position: Sitting)    Pulse 69    Temp 97.9 °F (36.6 °C) (Oral)    Resp 17    Ht 5' 8\" (1.727 m)    Wt 300 lb (136.1 kg)    SpO2 97%    BMI 45.61 kg/m2    Body mass index is 45.61 kg/(m^2). Constitutional: Well developed, nourished, no distress, alert, obese habitus   Eyes: Conjunctiva normal. PERRL. CV: S1, S2.  RRR. No murmurs/rubs. No thrills palpated. No carotid bruits. Intact distal pulses. No edema. Pulm: No abnormalities on inspection. Clear to auscultation bilaterally. No wheezing/rhonchi. Normal effort. Neuro: A/O x 3. No focal motor or sensory deficits. Speech normal.   Psych: Appropriate affect, judgement and insight. Short-term memory intact. Labwork and Ancillary Studies:    CBC w/Diff  No results found for: WBC, WBCLT, HGB, HGBP, PLT, HGBEXT, PLTEXT, HGBEXT, PLTEXT      Basic Metabolic Profile  No results found for: NA, K, CL, CO2, AGAP, GLU, BUN, CREA, BUCR, GFRAA, GFRNA, CA, GFRAA     Cholesterol  No results found for: CHOL, CHOLX, CHLST, CHOLV, HDL, LDL, LDLC, DLDLP, TGLX, TRIGL, TRIGP, CHHD, CHHDX    Assessment/Plan:    Diagnoses and all orders for this visit:    1. Essential hypertension  -     amLODIPine (NORVASC) 10 mg tablet; Take 1 Tab by mouth daily. -     lisinopril (PRINIVIL, ZESTRIL) 10 mg tablet; Take 1 Tab by mouth daily. -     metoprolol tartrate (LOPRESSOR) 50 mg tablet; Take 1 Tab by mouth three (3) times daily. Mr. Pamela Aguilar returns today for follow up of his medication changes in relationship to his recent hypertensive emergency. Dr. Pamela Aguilar looks well sitting up right in the chair and smiling. Has experienced dizziness once and at that time his blood pressure was normal.  Educated him on orthostatic hypotension and effects of beta blockers ie. Decreased endurance and erectile dysfunction. Health Maintenance:   Health Maintenance   Topic Date Due    DTaP/Tdap/Td series (1 - Tdap) 12/29/2006    Influenza Age 5 to Adult  08/01/2017       No orders of the defined types were placed in this encounter. All diagnosis have been discussed with the patient and all of the patient's questions have been answered. An After Visit Summary was printed and given to the patient. Follow-up Disposition:  Return in about 3 months (around 5/15/2018), or if symptoms worsen or fail to improve, for hypertension. ROBERTO Guaman-BC  [address] [facility]   [address] [facility] [address]  [address]

## 2018-02-15 NOTE — PROGRESS NOTES
1. Have you been to the ER, urgent care clinic since your last visit? Hospitalized since your last visit? No    2. Have you seen or consulted any other health care providers outside of the [address] [place] [address] since your last visit? Include any pap smears or colon screening. No       Patient here today for a 1 week follow-up.

## 2018-02-16 ENCOUNTER — PATIENT MESSAGE (OUTPATIENT)
Dept: FAMILY MEDICINE CLINIC | Age: 33
End: 2018-02-16

## 2018-02-19 ENCOUNTER — TELEPHONE (OUTPATIENT)
Dept: FAMILY MEDICINE CLINIC | Age: 33
End: 2018-02-19

## 2018-02-19 DIAGNOSIS — E03.9 ACQUIRED HYPOTHYROIDISM: Primary | ICD-10-CM

## 2018-02-19 RX ORDER — LEVOTHYROXINE SODIUM 25 UG/1
25 TABLET ORAL
Qty: 30 TAB | Refills: 2 | Status: SHIPPED | OUTPATIENT
Start: 2018-02-19 | End: 2018-05-16 | Stop reason: SDUPTHER

## 2018-02-19 NOTE — TELEPHONE ENCOUNTER
Mr. Maye Peralta returned my phone call. I updated him on his TSH results and recommendation of thyroid replacement therapy and discussed his oxygen saturation dropping while he sleeps and that I recommend a sleep study. I informed him sleep apnea could be contributing to his high blood pressure and that this condition is very hard on the heart. I also informed him the study could be done at home. Mr. Maye Peralta would like to move forward with thyroid replacement but will get back with me on a sleep study.

## 2018-03-20 ENCOUNTER — OFFICE VISIT (OUTPATIENT)
Dept: FAMILY MEDICINE CLINIC | Age: 33
End: 2018-03-20

## 2018-03-20 VITALS
TEMPERATURE: 97 F | DIASTOLIC BLOOD PRESSURE: 87 MMHG | WEIGHT: 310 LBS | HEIGHT: 68 IN | BODY MASS INDEX: 46.98 KG/M2 | RESPIRATION RATE: 17 BRPM | OXYGEN SATURATION: 97 % | HEART RATE: 74 BPM | SYSTOLIC BLOOD PRESSURE: 135 MMHG

## 2018-03-20 DIAGNOSIS — M54.2 NECK PAIN: Primary | ICD-10-CM

## 2018-03-20 NOTE — PROGRESS NOTES
Subjective:   Annette Morfin is a 28 y.o. male here for an acute visit for    Chief Complaint   Patient presents with    Dental Pain     pain in jaw area times 4 days       HPI      Onset::4 days ago  Location: neck and throat, left side, sometimes up to temple, causes headache at times, the pain lasts a couple of seconds  Duration: intermittent,   Characteristics: twinge, occurring more frequently last two days  Aggravating: nothing  Reliving: tylenol for headache  Treatment: tylenol  Pertinent PMH: nothing  Pertinent negatives: fever, dentist 1/2018, no masses, trauma      ROS  As above, the rest are negative    Current Outpatient Prescriptions   Medication Sig Dispense Refill    levothyroxine (SYNTHROID) 25 mcg tablet Take 1 Tab by mouth Daily (before breakfast). 30 Tab 2    amLODIPine (NORVASC) 10 mg tablet Take 1 Tab by mouth daily. 30 Tab 2    lisinopril (PRINIVIL, ZESTRIL) 10 mg tablet Take 1 Tab by mouth daily. 30 Tab 2    metoprolol tartrate (LOPRESSOR) 50 mg tablet Take 1 Tab by mouth three (3) times daily. 90 Tab 2        Patient Active Problem List   Diagnosis Code    Obesity, morbid ([address] E66.01    Hypertensive intracerebral hemorrhage ([address] I61.9    Hypertension I10    Headache R51    Hx of retinal hemorrhage Z86.69       No Known Allergies  Family History   Problem Relation Age of Onset    Hypertension Mother 48    Diabetes Father     Hypertension Father 36       Objective:     Vitals:    03/20/18 1131   BP: 135/87   Pulse: 74   Resp: 17   Temp: 97 °F (36.1 °C)   TempSrc: Oral   SpO2: 97%   Weight: 310 lb (140.6 kg)   Height: 5' 8\" (1.727 m)     Body mass index is 47.14 kg/(m^2). Appearance: alert, well appearing, and in no distress and oriented to person, place, and time. ENT normal.  Neck supple. No adenopathy or thyromegaly. Able to illicit pain to left sub maxillary area with deep palpation x1, unable to reproduce after that  PERRLA.    Lungs are clear, good air entry, no wheezes, rhonchi or rales. S1 and S2 normal, no murmurs, regular rate and rhythm. Neurological is normal, no focal findings. Labwork and Ancillary Studies:    CBC w/Diff  No results found for: WBC, WBCLT, HGB, HGBP, PLT, HGBEXT, PLTEXT, HGBEXT, PLTEXT      Basic Metabolic Profile  No results found for: NA, K, CL, CO2, AGAP, GLU, BUN, CREA, BUCR, GFRAA, GFRNA, CA, GFRAA     Cholesterol  No results found for: CHOL, CHOLX, CHLST, CHOLV, HDL, LDL, LDLC, DLDLP, TGLX, TRIGL, TRIGP, CHHD, CHHDX       Assessment/Plan:    Diagnoses and all orders for this visit:    1. Neck pain    Cannot identify any obvious reason for his pain. He has recently been working out with [employer] and eating healthy. Suspect musculoskeletal in origin. Will continue to monitor. Mr. Felipe Wiseman will evaluate his daily activities to see if he can identify a cause. Return to clinic if sxs persist, to ER if sxs worsen    Patient verbalized understanding to above instructions. AVS printed and given to pt. Follow-up Disposition:  Return if symptoms worsen or fail to improve. Shelia Shah, [facility]  [address] [facility]   [address] [facility] [address]  [address]

## 2018-03-20 NOTE — PROGRESS NOTES
1. Have you been to the ER, urgent care clinic since your last visit? Hospitalized since your last visit? No    2. Have you seen or consulted any other health care providers outside of the [address] [place] [address] since your last visit? Include any pap smears or colon screening. Yes When: Patient saw Chris Gray doctor in 3-       Patient here today for jaw pain times 4 days.

## 2018-03-20 NOTE — MR AVS SNAPSHOT
Coral Ardon [facility] 879 [address] [facility] [address] [id number] [phone] Patient: Demetrio Omalley MRN: [id number] :1985 Visit Information Date & Time Provider Department Dept. Phone Encounter #  
 3/20/2018 11:30 AM Susan Byrne NP  [address] [facility] [phone] [id number] Follow-up Instructions Return if symptoms worsen or fail to improve. Your Appointments 2018  8:30 AM  
Follow Up with Susan Byrne NP  
[employer] ([address]) Appt Note: 3 month fu appt [facility] [address] [facility]  
  
   
 70[facility] 62[facility] Upcoming Health Maintenance Date Due DTaP/Tdap/Td series (1 - Tdap) 2006 Influenza Age 5 to Adult 2017 Allergies as of 3/20/2018  Review Complete On: 2/15/2018 By: Susan Byrne NP No Known Allergies Current Immunizations  Reviewed on 2018 No immunizations on file. Not reviewed this visit You Were Diagnosed With   
  
 Codes Comments Neck pain    -  Primary ICD-10-CM: M54.2 ICD-9-CM: 723.1 Vitals BP Pulse Temp Resp Height(growth percentile) Weight(growth percentile) 135/87 (BP 1 Location: Left arm, BP Patient Position: Sitting) 74 97 °F (36.1 °C) (Oral) 17 5' 8\" (1.727 m) 310 lb (140.6 kg) SpO2 BMI Smoking Status 97% 47.14 kg/m2 Never Smoker Vitals History BMI and BSA Data Body Mass Index Body Surface Area  
 47.14 kg/m 2 2.6 m 2 Preferred Pharmacy Pharmacy Name Phone Jan 52 [address] [place] [address] Michael Robbins Your Updated Medication List  
  
   
This list is accurate as of 3/20/18 12:00 PM.  Always use your most recent med list. amLODIPine 10 mg tablet Commonly known as:  Tumacacorithien Priestff Take 1 Tab by mouth daily. levothyroxine 25 mcg tablet Commonly known as:  SYNTHROID Take 1 Tab by mouth Daily (before breakfast). lisinopril 10 mg tablet Commonly known as:  Iris [place] Take 1 Tab by mouth daily. metoprolol tartrate 50 mg tablet Commonly known as:  LOPRESSOR Take 1 Tab by mouth three (3) times daily. Follow-up Instructions Return if symptoms worsen or fail to improve. Introducing [facility]! Dear Renu Palomo: 
Thank you for requesting a InStream Media account. Our records indicate that you already have an active InStream Media account. You can access your account anytime at [email or web address] Did you know that you can access your hospital and ER discharge instructions at any time in InStream Media? You can also review all of your test results from your hospital stay or ER visit. Additional Information If you have questions, please visit the Frequently Asked Questions section of the InStream Media website at [email or web address] Remember, InStream Media is NOT to be used for urgent needs. For medical emergencies, dial 911. Now available from your iPhone and Android! Please provide this summary of care documentation to your next provider. Your primary care clinician is listed as Tae Campos. If you have any questions after today's visit, please call [phone].

## 2018-03-23 ENCOUNTER — OFFICE VISIT (OUTPATIENT)
Dept: FAMILY MEDICINE CLINIC | Age: 33
End: 2018-03-23

## 2018-03-23 VITALS
BODY MASS INDEX: 46.98 KG/M2 | SYSTOLIC BLOOD PRESSURE: 119 MMHG | HEART RATE: 83 BPM | WEIGHT: 310 LBS | HEIGHT: 68 IN | DIASTOLIC BLOOD PRESSURE: 73 MMHG | RESPIRATION RATE: 17 BRPM | TEMPERATURE: 97.7 F

## 2018-03-23 DIAGNOSIS — H65.92 LEFT NON-SUPPURATIVE OTITIS MEDIA: Primary | ICD-10-CM

## 2018-03-23 RX ORDER — AMOXICILLIN AND CLAVULANATE POTASSIUM 875; 125 MG/1; MG/1
1 TABLET, FILM COATED ORAL EVERY 12 HOURS
Qty: 20 TAB | Refills: 0 | Status: SHIPPED | OUTPATIENT
Start: 2018-03-23 | End: 2018-04-02

## 2018-03-23 NOTE — PATIENT INSTRUCTIONS
Ear Infection (Otitis Media): Care Instructions  Your Care Instructions    An ear infection may start with a cold and affect the middle ear (otitis media). It can hurt a lot. Most ear infections clear up on their own in a couple of days. Most often you will not need antibiotics. This is because many ear infections are caused by a virus. Antibiotics don't work against a virus. Regular doses of pain medicines are the best way to reduce your fever and help you feel better. Follow-up care is a key part of your treatment and safety. Be sure to make and go to all appointments, and call your doctor if you are having problems. It's also a good idea to know your test results and keep a list of the medicines you take. How can you care for yourself at home? · Take pain medicines exactly as directed. ¨ If the doctor gave you a prescription medicine for pain, take it as prescribed. ¨ If you are not taking a prescription pain medicine, take an over-the-counter medicine, such as acetaminophen (Tylenol), ibuprofen (Advil, Motrin), or naproxen (Aleve). Read and follow all instructions on the label. ¨ Do not take two or more pain medicines at the same time unless the doctor told you to. Many pain medicines have acetaminophen, which is Tylenol. Too much acetaminophen (Tylenol) can be harmful. · Plan to take a full dose of pain reliever before bedtime. Getting enough sleep will help you get better. · Try a warm, moist washcloth on the ear. It may help relieve pain. · If your doctor prescribed antibiotics, take them as directed. Do not stop taking them just because you feel better. You need to take the full course of antibiotics. When should you call for help? Call your doctor now or seek immediate medical care if:  ? · You have new or increasing ear pain. ? · You have new or increasing pus or blood draining from your ear. ? · You have a fever with a stiff neck or a severe headache. ? Watch closely for changes in your health, and be sure to contact your doctor if:  ? · You have new or worse symptoms. ? · You are not getting better after taking an antibiotic for 2 days. Where can you learn more? Go to http://tony-bertram.info/. Enter T112 in the search box to learn more about \"Ear Infection (Otitis Media): Care Instructions. \"  Current as of: May 12, 2017  Content Version: 11.4  © 2331-7323 Healthwise, Qwaya. Care instructions adapted under license by Plink (which disclaims liability or warranty for this information). If you have questions about a medical condition or this instruction, always ask your healthcare professional. [address] any warranty or liability for your use of this information.

## 2018-03-23 NOTE — PROGRESS NOTES
Subjective:   Demetrio Omalley is a 28 y.o. male here for an acute visit for    Chief Complaint   Patient presents with    Ear Pain       HPI      Onset[de-identified] 7 days ago, had a previous visit for same problem  Location: left inner ear and externally around the pinna and down to mastoid area  Duration: constant  Characteristics: pain constant 5-9/10  Aggravating: movement, touching  Reliving: nothing  Treatment: nothing  Pertinent PMH: nothing  Pertinent negatives: fever, loss of hearing, temporal pain, dental problems (went to dentist 1/2018), sore throat       ROS  As above, the rest are negative    Current Outpatient Prescriptions   Medication Sig Dispense Refill    amoxicillin-clavulanate (AUGMENTIN) 875-125 mg per tablet Take 1 Tab by mouth every twelve (12) hours for 10 days. 20 Tab 0    levothyroxine (SYNTHROID) 25 mcg tablet Take 1 Tab by mouth Daily (before breakfast). 30 Tab 2    amLODIPine (NORVASC) 10 mg tablet Take 1 Tab by mouth daily. 30 Tab 2    lisinopril (PRINIVIL, ZESTRIL) 10 mg tablet Take 1 Tab by mouth daily. 30 Tab 2    metoprolol tartrate (LOPRESSOR) 50 mg tablet Take 1 Tab by mouth three (3) times daily. 90 Tab 2        Patient Active Problem List   Diagnosis Code    Obesity, morbid ([address] E66.01    Hypertensive intracerebral hemorrhage ([facility] I61.9    Hypertension I10    Headache R51    Hx of retinal hemorrhage Z86.69       No Known Allergies  Family History   Problem Relation Age of Onset    Hypertension Mother 48    Diabetes Father     Hypertension Father 36       Objective:     Vitals:    03/23/18 1316   BP: 119/73   Pulse: 83   Resp: 17   Temp: 97.7 °F (36.5 °C)   TempSrc: Oral   Weight: 310 lb (140.6 kg)   Height: 5' 8\" (1.727 m)     Body mass index is 47.14 kg/(m^2). Appearance: alert, well appearing, and in no distress. ENT left ear canal erythemas, fluid behind TM,   Neck supple. Inflammation to Left jaw, No adenopathy or thyromegaly. PERRLA.    Lungs are clear, good air entry, no wheezes, rhonchi or rales. S1 and S2 normal, no murmurs, regular rate and rhythm. Neurological is normal, no focal findings. Labwork and Ancillary Studies:    CBC w/Diff  No results found for: WBC, WBCLT, HGB, HGBP, PLT, HGBEXT, PLTEXT, HGBEXT, PLTEXT      Basic Metabolic Profile  No results found for: NA, K, CL, CO2, AGAP, GLU, BUN, CREA, BUCR, GFRAA, GFRNA, CA, GFRAA     Cholesterol  No results found for: CHOL, CHOLX, CHLST, CHOLV, HDL, LDL, LDLC, DLDLP, TGLX, TRIGL, TRIGP, CHHD, CHHDX       Assessment/Plan:    Diagnoses and all orders for this visit:    1. Left non-suppurative otitis media  -     amoxicillin-clavulanate (AUGMENTIN) 875-125 mg per tablet; Take 1 Tab by mouth every twelve (12) hours for 10 days. -as he has had this problems for seven days now and it is worsening will treat with a round of antibiotics. Return to clinic if sxs persist, to ER if sxs worsen    Patient verbalized understanding to above instructions. AVS printed and given to pt. Follow-up Disposition:  Return if symptoms worsen or fail to improve. Alexis Morton, [facility]-BC  810 [facility]   [address] [facility] [address]  [address]

## 2018-03-23 NOTE — MR AVS SNAPSHOT
Coral Ardon [facility] 879 68 [facility] [address] [address] 
[phone] Patient: Demetrio Omalley MRN: [id number] :1985 Visit Information Date & Time Provider Department Dept. Phone Encounter #  
 3/23/2018  1:00 PM Susan Byrne Yao Gavin [id number] Follow-up Instructions Return if symptoms worsen or fail to improve. Your Appointments 2018  8:30 AM  
Follow Up with Susan Byrne NP  
[employer] ([address] [place] [address]) Appt Note: 3 month fu appt [facility] [address] 500 [facility]  
  
   
 7031  [facility] [address] [place] [address] 95 Upcoming Health Maintenance Date Due DTaP/Tdap/Td series (1 - Tdap) 2006 Influenza Age 5 to Adult 2017 Allergies as of 3/23/2018  Review Complete On: 3/23/2018 By: Susan Byrne NP No Known Allergies Current Immunizations  Reviewed on 3/23/2018 No immunizations on file. Reviewed by Anthony Johnson LPN on  at  1:16 PM  
You Were Diagnosed With   
  
 Codes Comments Left non-suppurative otitis media    -  Primary ICD-10-CM: H65.92 
ICD-9-CM: 381. 4 Vitals BP Pulse Temp Resp Height(growth percentile) Weight(growth percentile) 119/73 83 97.7 °F (36.5 °C) (Oral) 17 5' 8\" (1.727 m) 310 lb (140.6 kg) BMI Smoking Status 47.14 kg/m2 Never Smoker Vitals History BMI and BSA Data Body Mass Index Body Surface Area  
 47.14 kg/m 2 2.6 m 2 Preferred Pharmacy Pharmacy Name Phone Jan [address] [place] [address] Michael Robbins Your Updated Medication List  
  
   
This list is accurate as of 3/23/18  1:42 PM.  Always use your most recent med list. amLODIPine 10 mg tablet Commonly known as:  Gladstone Sandhoff Take 1 Tab by mouth daily. amoxicillin-clavulanate 875-125 mg per tablet Commonly known as:  AUGMENTIN Take 1 Tab by mouth every twelve (12) hours for 10 days. levothyroxine 25 mcg tablet Commonly known as:  SYNTHROID Take 1 Tab by mouth Daily (before breakfast). lisinopril 10 mg tablet Commonly known as:  Luetta Manzanilla Take 1 Tab by mouth daily. metoprolol tartrate 50 mg tablet Commonly known as:  LOPRESSOR Take 1 Tab by mouth three (3) times daily. Prescriptions Sent to Pharmacy Refills  
 amoxicillin-clavulanate (AUGMENTIN) 875-125 mg per tablet 0 Sig: Take 1 Tab by mouth every twelve (12) hours for 10 days. Class: Normal  
 Pharmacy: [facility] [address] [place] [address] #: [phone] Route: Oral  
  
Follow-up Instructions Return if symptoms worsen or fail to improve. Patient Instructions Ear Infection (Otitis Media): Care Instructions Your Care Instructions An ear infection may start with a cold and affect the middle ear (otitis media). It can hurt a lot. Most ear infections clear up on their own in a couple of days. Most often you will not need antibiotics. This is because many ear infections are caused by a virus. Antibiotics don't work against a virus. Regular doses of pain medicines are the best way to reduce your fever and help you feel better. Follow-up care is a key part of your treatment and safety. Be sure to make and go to all appointments, and call your doctor if you are having problems. It's also a good idea to know your test results and keep a list of the medicines you take. How can you care for yourself at home? · Take pain medicines exactly as directed. ¨ If the doctor gave you a prescription medicine for pain, take it as prescribed.  
¨ If you are not taking a prescription pain medicine, take an over-the-counter medicine, such as acetaminophen (Tylenol), ibuprofen (Advil, Motrin), or naproxen (Aleve). Read and follow all instructions on the label. ¨ Do not take two or more pain medicines at the same time unless the doctor told you to. Many pain medicines have acetaminophen, which is Tylenol. Too much acetaminophen (Tylenol) can be harmful. · Plan to take a full dose of pain reliever before bedtime. Getting enough sleep will help you get better. · Try a warm, moist washcloth on the ear. It may help relieve pain. · If your doctor prescribed antibiotics, take them as directed. Do not stop taking them just because you feel better. You need to take the full course of antibiotics. When should you call for help? Call your doctor now or seek immediate medical care if: 
? · You have new or increasing ear pain. ? · You have new or increasing pus or blood draining from your ear. ? · You have a fever with a stiff neck or a severe headache. ? Watch closely for changes in your health, and be sure to contact your doctor if: 
? · You have new or worse symptoms. ? · You are not getting better after taking an antibiotic for 2 days. Where can you learn more? Go to http://tony-bertram.info/. Enter C399 in the search box to learn more about \"Ear Infection (Otitis Media): Care Instructions. \" Current as of: May 12, 2017 Content Version: 11.4 © 3365-1168 NativeX. Care instructions adapted under license by [employer] (which disclaims liability or warranty for this information). If you have questions about a medical condition or this instruction, always ask your healthcare professional. [address] any warranty or liability for your use of this information. Introducing [facility] SERVICES! Dear Mauri Bolanos: 
Thank you for requesting a [email or web address] account.   Our records indicate that you already have an active Storm Player account. You can access your account anytime at [email or web address] Did you know that you can access your hospital and ER discharge instructions at any time in Storm Player? You can also review all of your test results from your hospital stay or ER visit. Additional Information If you have questions, please visit the Frequently Asked Questions section of the Storm Player website at [email or web address] Remember, Storm Player is NOT to be used for urgent needs. For medical emergencies, dial 911. Now available from your iPhone and Android! Please provide this summary of care documentation to your next provider. Your primary care clinician is listed as Tana Garcia. If you have any questions after today's visit, please call [phone].

## 2018-03-23 NOTE — PROGRESS NOTES
1. Have you been to the ER, urgent care clinic since your last visit? Hospitalized since your last visit? No    2. Have you seen or consulted any other health care providers outside of the [address] [place] [address] since your last visit? Include any pap smears or colon screening.  No       Patient presents for left ear pain

## 2018-05-08 ENCOUNTER — OFFICE VISIT (OUTPATIENT)
Dept: FAMILY MEDICINE CLINIC | Age: 33
End: 2018-05-08

## 2018-05-08 ENCOUNTER — HOSPITAL ENCOUNTER (OUTPATIENT)
Dept: LAB | Age: 33
Discharge: HOME | End: 2018-05-08
Payer: COMMERCIAL

## 2018-05-08 VITALS
DIASTOLIC BLOOD PRESSURE: 69 MMHG | BODY MASS INDEX: 47.29 KG/M2 | HEIGHT: 68 IN | HEART RATE: 70 BPM | SYSTOLIC BLOOD PRESSURE: 102 MMHG | RESPIRATION RATE: 18 BRPM | TEMPERATURE: 96.9 F | WEIGHT: 312 LBS

## 2018-05-08 DIAGNOSIS — E03.9 ACQUIRED HYPOTHYROIDISM: ICD-10-CM

## 2018-05-08 DIAGNOSIS — I10 ESSENTIAL HYPERTENSION: ICD-10-CM

## 2018-05-08 DIAGNOSIS — E66.01 OBESITY, MORBID (HCC): Primary | ICD-10-CM

## 2018-05-08 DIAGNOSIS — E66.01 OBESITY, MORBID (HCC): ICD-10-CM

## 2018-05-08 LAB
ANION GAP SERPL CALC-SCNC: 10 MMOL/L (ref 3–18)
BUN SERPL-MCNC: 18 MG/DL (ref 7–18)
BUN/CREAT SERPL: 15 (ref 12–20)
CALCIUM SERPL-MCNC: 9.6 MG/DL (ref 8.5–10.1)
CHLORIDE SERPL-SCNC: 106 MMOL/L (ref 100–108)
CO2 SERPL-SCNC: 22 MMOL/L (ref 21–32)
CREAT SERPL-MCNC: 1.17 MG/DL (ref 0.6–1.3)
ERYTHROCYTE [DISTWIDTH] IN BLOOD BY AUTOMATED COUNT: 14.8 % (ref 11.6–14.5)
GLUCOSE SERPL-MCNC: 90 MG/DL (ref 74–99)
HCT VFR BLD AUTO: 45.9 % (ref 36–48)
HGB BLD-MCNC: 14.9 G/DL (ref 13–16)
MCH RBC QN AUTO: 28.8 PG (ref 24–34)
MCHC RBC AUTO-ENTMCNC: 32.5 G/DL (ref 31–37)
MCV RBC AUTO: 88.6 FL (ref 74–97)
PLATELET # BLD AUTO: 377 K/UL (ref 135–420)
PMV BLD AUTO: 10 FL (ref 9.2–11.8)
POTASSIUM SERPL-SCNC: 4.7 MMOL/L (ref 3.5–5.5)
RBC # BLD AUTO: 5.18 M/UL (ref 4.7–5.5)
SODIUM SERPL-SCNC: 138 MMOL/L (ref 136–145)
T4 FREE SERPL-MCNC: 1.3 NG/DL (ref 0.7–1.5)
TSH SERPL DL<=0.05 MIU/L-ACNC: 1.32 UIU/ML (ref 0.36–3.74)
WBC # BLD AUTO: 11.3 K/UL (ref 4.6–13.2)

## 2018-05-08 PROCEDURE — 36415 COLL VENOUS BLD VENIPUNCTURE: CPT | Performed by: NURSE PRACTITIONER

## 2018-05-08 PROCEDURE — 84443 ASSAY THYROID STIM HORMONE: CPT | Performed by: NURSE PRACTITIONER

## 2018-05-08 PROCEDURE — 80048 BASIC METABOLIC PNL TOTAL CA: CPT | Performed by: NURSE PRACTITIONER

## 2018-05-08 PROCEDURE — 84439 ASSAY OF FREE THYROXINE: CPT | Performed by: NURSE PRACTITIONER

## 2018-05-08 PROCEDURE — 85027 COMPLETE CBC AUTOMATED: CPT | Performed by: NURSE PRACTITIONER

## 2018-05-08 NOTE — PROGRESS NOTES
1. Have you been to the ER, urgent care clinic since your last visit? Hospitalized since your last visit? No    2. Have you seen or consulted any other health care providers outside of the [address] [place] [address] since your last visit? Include any pap smears or colon screening.  No

## 2018-05-08 NOTE — MR AVS SNAPSHOT
Coral [facility]illo [place] 879 68 [facility] [address] 320 [facility] 83 [address] 
[phone] Patient: Harpreet Sánchez MRN: [id number] :1985 Visit Information Date & Time Provider Department Dept. Phone Encounter #  
 2018 11:30 AM Raina Fernandes, [address] [place] [address] [phone] [id number] Follow-up Instructions Return in about 3 months (around 2018) for chronic conditions, vinson. Upcoming Health Maintenance Date Due DTaP/Tdap/Td series (1 - Tdap) 2006 Influenza Age 5 to Adult 2018 Allergies as of 2018  Review Complete On: 2018 By: Keenan Ziegler LPN No Known Allergies Current Immunizations  Reviewed on 3/23/2018 No immunizations on file. Not reviewed this visit You Were Diagnosed With   
  
 Codes Comments Obesity, morbid ([facility]    -  Primary ICD-10-CM: E66.01 
ICD-9-CM: 278.01 Essential hypertension     ICD-10-CM: I10 
ICD-9-CM: 401.9 Acquired hypothyroidism     ICD-10-CM: E03.9 ICD-9-CM: 779. 9 Vitals BP Pulse Temp Resp Height(growth percentile) Weight(growth percentile) 102/69 70 96.9 °F (36.1 °C) (Oral) 18 5' 8\" (1.727 m) 312 lb (141.5 kg) BMI Smoking Status 47.44 kg/m2 Never Smoker Vitals History BMI and BSA Data Body Mass Index Body Surface Area  
 47.44 kg/m 2 2.61 m 2 Preferred Pharmacy Pharmacy Name Phone Jan 52 [address] [place] [address] Michael Robbins Your Updated Medication List  
  
   
This list is accurate as of 18 12:04 PM.  Always use your most recent med list. amLODIPine 10 mg tablet Commonly known as:  Stephany Cordial Take 1 Tab by mouth daily. levothyroxine 25 mcg tablet Commonly known as:  SYNTHROID Take 1 Tab by mouth Daily (before breakfast). lisinopril 10 mg tablet Commonly known as:  Kavitha Bills Take 1 Tab by mouth daily. metoprolol tartrate 50 mg tablet Commonly known as:  LOPRESSOR Take 1 Tab by mouth three (3) times daily. Follow-up Instructions Return in about 3 months (around 8/8/2018) for chronic conditions, vinson. To-Do List   
 05/08/2018 Lab:  CBC W/O DIFF   
  
 05/08/2018 Lab:  METABOLIC PANEL, BASIC   
  
 05/08/2018 Lab:  T4, FREE   
  
 05/08/2018 Lab:  TSH 3RD GENERATION Introducing [facility]! Dear Bronwyn Friends: 
Thank you for requesting a CodeBaby account. Our records indicate that you already have an active CodeBaby account. You can access your account anytime at [email or web address] Did you know that you can access your hospital and ER discharge instructions at any time in CodeBaby? You can also review all of your test results from your hospital stay or ER visit. Additional Information If you have questions, please visit the Frequently Asked Questions section of the CodeBaby website at [email or web address] Remember, CodeBaby is NOT to be used for urgent needs. For medical emergencies, dial 911. Now available from your iPhone and Android! Please provide this summary of care documentation to your next provider. Your primary care clinician is listed as David Cottrell. If you have any questions after today's visit, please call [phone].

## 2018-05-08 NOTE — PROGRESS NOTES
Jersey Hackett is a 28 y.o. male and presents with Follow Up Chronic Condition       Subjective:    Hypertension:   Patient reports taking medications as instructed. yes   No medication side effects noted. no  Headache upon wakening. no   Home BP monitoring in range of 134'Y systolic. Working on diet and exercise  Play basketball on Sundays \"about 5 hours\"  2-3 times week gym, x1 hr  Goes to the gym 2-3 times a week for about 1 hour, 1/2 cardio and 1/2 weights    ROS:  Negative except as stated in HPI    The problem list was updated as a part of today's visit. Patient Active Problem List   Diagnosis Code    Obesity, morbid ([facility] E66.01    Hypertensive intracerebral hemorrhage ([facility] I61.9    Hypertension I10    Headache R51    Hx of retinal hemorrhage Z86.69       The PSH, FH were reviewed. SH:  Social History   Substance Use Topics    Smoking status: Never Smoker    Smokeless tobacco: Never Used    Alcohol use Yes      Comment: Occaisionally         Medications/Allergies:  Current Outpatient Prescriptions on File Prior to Visit   Medication Sig Dispense Refill    levothyroxine (SYNTHROID) 25 mcg tablet Take 1 Tab by mouth Daily (before breakfast). 30 Tab 2    amLODIPine (NORVASC) 10 mg tablet Take 1 Tab by mouth daily. 30 Tab 2    lisinopril (PRINIVIL, ZESTRIL) 10 mg tablet Take 1 Tab by mouth daily. 30 Tab 2    metoprolol tartrate (LOPRESSOR) 50 mg tablet Take 1 Tab by mouth three (3) times daily. 90 Tab 2     No current facility-administered medications on file prior to visit. No Known Allergies    Objective:  Visit Vitals    /69    Pulse 70    Temp 96.9 °F (36.1 °C) (Oral)    Resp 18    Ht 5' 8\" (1.727 m)    Wt 312 lb (141.5 kg)    BMI 47.44 kg/m2    Body mass index is 47.44 kg/(m^2). Constitutional: Well developed, nourished, no distress, alert, credible source of information, no  used   Eyes: Conjunctiva normal. PERRL. CV: S1, S2.  RRR.   No murmurs/rubs. No thrills palpated. No carotid bruits. Intact distal pulses. No edema. Pulm: No abnormalities on inspection. Clear to auscultation bilaterally. No wheezing/rhonchi. Normal effort. Labwork and Ancillary Studies:    CBC w/Diff  No results found for: WBC, WBCLT, HGB, HGBP, PLT, HGBEXT, PLTEXT, HGBEXT, PLTEXT      Basic Metabolic Profile  No results found for: NA, K, CL, CO2, AGAP, GLU, BUN, CREA, BUCR, GFRAA, GFRNA, CA, GFRAA     Cholesterol  No results found for: CHOL, CHOLX, CHLST, CHOLV, HDL, LDL, LDLC, DLDLP, TGLX, TRIGL, TRIGP, CHHD, CHHDX    Assessment/Plan:    Diagnoses and all orders for this visit:    1. Obesity, morbid ([facility]  -     CBC W/O DIFF; Future    2. Essential hypertension  -     METABOLIC PANEL, BASIC; Future    3. Acquired hypothyroidism  -     T4, FREE; Future  -     TSH 3RD GENERATION; Future    Diet: encouraged to count calories, hand out on diet menus given for guidance on healthy eating    Health Maintenance:   Health Maintenance   Topic Date Due    DTaP/Tdap/Td series (1 - Tdap) 12/29/2006    Influenza Age 5 to Adult  08/01/2018       No orders of the defined types were placed in this encounter. Follow-up Disposition:  Return in about 3 months (around 8/8/2018) for chronic conditions, vinson. An After Visit Summary was printed and given to the patient. All diagnosis have been discussed with the patient and all of the patient's questions have been answered. Faith Quinones, ROBERTO-BC  810 [facility]   703 [address] [facility] [address]  [address]

## 2018-05-23 DIAGNOSIS — I10 ESSENTIAL HYPERTENSION: ICD-10-CM

## 2018-05-23 RX ORDER — AMLODIPINE BESYLATE 10 MG/1
TABLET ORAL
Qty: 30 TAB | Refills: 0 | Status: SHIPPED | OUTPATIENT
Start: 2018-05-23 | End: 2018-06-24 | Stop reason: SDUPTHER

## 2018-05-23 RX ORDER — METOPROLOL TARTRATE 50 MG/1
TABLET ORAL
Qty: 90 TAB | Refills: 0 | Status: SHIPPED | OUTPATIENT
Start: 2018-05-23 | End: 2018-06-24 | Stop reason: SDUPTHER

## 2018-06-24 DIAGNOSIS — I10 ESSENTIAL HYPERTENSION: ICD-10-CM

## 2018-06-24 RX ORDER — METOPROLOL TARTRATE 50 MG/1
TABLET ORAL
Qty: 90 TAB | Refills: 0 | Status: SHIPPED | OUTPATIENT
Start: 2018-06-24 | End: 2018-07-22 | Stop reason: SDUPTHER

## 2018-06-24 RX ORDER — AMLODIPINE BESYLATE 10 MG/1
TABLET ORAL
Qty: 30 TAB | Refills: 0 | Status: SHIPPED | OUTPATIENT
Start: 2018-06-24 | End: 2018-07-22 | Stop reason: SDUPTHER

## 2018-07-16 ENCOUNTER — PATIENT MESSAGE (OUTPATIENT)
Dept: FAMILY MEDICINE CLINIC | Age: 33
End: 2018-07-16

## 2018-07-16 DIAGNOSIS — E66.01 CLASS 3 SEVERE OBESITY DUE TO EXCESS CALORIES WITH SERIOUS COMORBIDITY AND BODY MASS INDEX (BMI) OF 45.0 TO 49.9 IN ADULT (HCC): ICD-10-CM

## 2018-07-16 DIAGNOSIS — I10 ESSENTIAL HYPERTENSION: Primary | ICD-10-CM

## 2018-07-16 NOTE — TELEPHONE ENCOUNTER
From: Emily Young  Sent: 7/16/2018 8:12 AM EDT  Subject: Non-Urgent Medical Question    Can you forward to Dr. Renato Snyder please    Mrs. Renato Snyder   Since my last carter I have started 30min of cardio 3 times a week and adjusted my eating to try and lose the weight and I seem to be staying exactly where I was. I'm not to proud to say what I'm doing isn't working. Can I have a referral to the nutritionist people that you tried to put me in contact with before.      Thank you Javier Miller

## 2018-07-22 DIAGNOSIS — I10 ESSENTIAL HYPERTENSION: ICD-10-CM

## 2018-07-23 RX ORDER — AMLODIPINE BESYLATE 10 MG/1
TABLET ORAL
Qty: 30 TAB | Refills: 0 | Status: SHIPPED | OUTPATIENT
Start: 2018-07-23 | End: 2018-08-23 | Stop reason: SDUPTHER

## 2018-07-23 RX ORDER — METOPROLOL TARTRATE 50 MG/1
TABLET ORAL
Qty: 90 TAB | Refills: 0 | Status: SHIPPED | OUTPATIENT
Start: 2018-07-23 | End: 2018-08-23 | Stop reason: SDUPTHER

## 2018-08-08 ENCOUNTER — HOSPITAL ENCOUNTER (OUTPATIENT)
Dept: LAB | Age: 33
Discharge: HOME | End: 2018-08-08
Payer: COMMERCIAL

## 2018-08-08 ENCOUNTER — OFFICE VISIT (OUTPATIENT)
Dept: FAMILY MEDICINE CLINIC | Age: 33
End: 2018-08-08

## 2018-08-08 VITALS
RESPIRATION RATE: 18 BRPM | DIASTOLIC BLOOD PRESSURE: 68 MMHG | WEIGHT: 313 LBS | TEMPERATURE: 97.7 F | SYSTOLIC BLOOD PRESSURE: 107 MMHG | HEIGHT: 68 IN | HEART RATE: 69 BPM | BODY MASS INDEX: 47.44 KG/M2

## 2018-08-08 DIAGNOSIS — G47.39 SLEEP APNEA-LIKE BEHAVIOR: ICD-10-CM

## 2018-08-08 DIAGNOSIS — I10 ESSENTIAL HYPERTENSION: ICD-10-CM

## 2018-08-08 DIAGNOSIS — E66.01 OBESITY, MORBID (HCC): Primary | ICD-10-CM

## 2018-08-08 DIAGNOSIS — E66.01 OBESITY, MORBID (HCC): ICD-10-CM

## 2018-08-08 DIAGNOSIS — E55.9 VITAMIN D DEFICIENCY: ICD-10-CM

## 2018-08-08 DIAGNOSIS — E03.9 ACQUIRED HYPOTHYROIDISM: ICD-10-CM

## 2018-08-08 LAB
T4 FREE SERPL-MCNC: 1.2 NG/DL (ref 0.7–1.5)
TSH SERPL DL<=0.05 MIU/L-ACNC: 1.3 UIU/ML (ref 0.36–3.74)

## 2018-08-08 PROCEDURE — 84480 ASSAY TRIIODOTHYRONINE (T3): CPT | Performed by: NURSE PRACTITIONER

## 2018-08-08 PROCEDURE — 84443 ASSAY THYROID STIM HORMONE: CPT | Performed by: NURSE PRACTITIONER

## 2018-08-08 PROCEDURE — 84439 ASSAY OF FREE THYROXINE: CPT | Performed by: NURSE PRACTITIONER

## 2018-08-08 PROCEDURE — 82306 VITAMIN D 25 HYDROXY: CPT | Performed by: NURSE PRACTITIONER

## 2018-08-08 PROCEDURE — 36415 COLL VENOUS BLD VENIPUNCTURE: CPT | Performed by: NURSE PRACTITIONER

## 2018-08-08 NOTE — PROGRESS NOTES
Gurdeep Justin is a 28 y.o. male and presents with Follow Up Chronic Condition (3 month)         Fasting: No    Last visit: May 8, 2018    Subjective:    Hypertension:   Patient reports taking medications as instructed. yes   No medication side effects noted. no  Headache upon wakening. no   Home BP monitoring in range of 536/91, 743/42  systolic. Obesity  Basketball pract 2x a week, game once  Softball pract 1x week and games, on the ballfield up to x5hrs in one day  Gym about once a week x30min cardio  Frustrated he is not losing weight  States he and his wife are eating sensible with no fast food, fatty or fried foods or sweets  Ear pain  Cant sleep on that side, no drain or red, not tender to touch  No treatment  No earbuds, only uses over the ear headphones      ROS:  As in HPI, otherwise negative     ROS    The problem list was updated as a part of today's visit. Patient Active Problem List   Diagnosis Code    Obesity, morbid ([facility] E66.01    Hypertensive intracerebral hemorrhage ([facility] I61.9    Hypertension I10    Headache R51    Hx of retinal hemorrhage Z86.69    Acquired hypothyroidism E03.9       The PSH, FH were reviewed. SH:  Social History   Substance Use Topics    Smoking status: Never Smoker    Smokeless tobacco: Never Used    Alcohol use Yes      Comment: Occaisionally       Medications/Allergies:  Current Outpatient Prescriptions on File Prior to Visit   Medication Sig Dispense Refill    amLODIPine (NORVASC) 10 mg tablet TAKE 1 TABLET BY MOUTH DAILY 30 Tab 0    metoprolol tartrate (LOPRESSOR) 50 mg tablet TAKE 1 TABLET BY MOUTH THREE TIMES DAILY 90 Tab 0    lisinopril (PRINIVIL, ZESTRIL) 10 mg tablet TAKE 1 TABLET BY MOUTH DAILY 30 Tab 3    levothyroxine (SYNTHROID) 25 mcg tablet TAKE 1 TABLET BY MOUTH DAILY BEFORE BREAKFAST 30 Tab 2     No current facility-administered medications on file prior to visit.          No Known Allergies    Objective:  Visit Vitals    /68    Pulse 69    Temp 97.7 °F (36.5 °C) (Oral)    Resp 18    Ht 5' 8\" (1.727 m)    Wt 313 lb (142 kg)    BMI 47.59 kg/m2    Body mass index is 47.59 kg/(m^2). Physical assessment  Physical Exam   Constitutional: He is oriented to person, place, and time and well-developed, well-nourished, and in no distress. HENT:   Right Ear: Hearing, tympanic membrane, external ear and ear canal normal.   Left Ear: Hearing, tympanic membrane, external ear and ear canal normal.   Cardiovascular: Normal rate, regular rhythm, S1 normal and S2 normal.    Pulmonary/Chest: Effort normal and breath sounds normal.   Musculoskeletal: Normal range of motion. Neurological: He is alert and oriented to person, place, and time. Gait normal.   Nursing note and vitals reviewed. Labwork and Ancillary Studies:    CBC w/Diff  Lab Results   Component Value Date/Time    WBC 11.3 05/08/2018 12:10 PM    HGB 14.9 05/08/2018 12:10 PM    PLATELET 716 30/87/0555 12:10 PM         Basic Metabolic Profile  Lab Results   Component Value Date/Time    Sodium 138 05/08/2018 12:10 PM    Potassium 4.7 05/08/2018 12:10 PM    Chloride 106 05/08/2018 12:10 PM    CO2 22 05/08/2018 12:10 PM    Anion gap 10 05/08/2018 12:10 PM    Glucose 90 05/08/2018 12:10 PM    BUN 18 05/08/2018 12:10 PM    Creatinine 1.17 05/08/2018 12:10 PM    BUN/Creatinine ratio 15 05/08/2018 12:10 PM    GFR est AA >60 05/08/2018 12:10 PM    GFR est non-AA >60 05/08/2018 12:10 PM    Calcium 9.6 05/08/2018 12:10 PM        Cholesterol  No results found for: CHOL, CHOLX, CHLST, CHOLV, HDL, LDL, LDLC, DLDLP, TGLX, TRIGL, TRIGP, CHHD, CHHDX    Assessment/Plan:    Diagnoses and all orders for this visit:    1. Obesity, morbid ([address]  -     VITAMIN D, 25 HYDROXY; Future  He has an upcoming appointment with nutritionists   2. Essential hypertension  Well controlled, continue same treatment    3. Acquired hypothyroidism  -     TSH 3RD GENERATION; Future  -     T4, FREE;  Future  -     T3 TOTAL; Future  Follow up levels. Although he is getting good activity and exercise into his daily life he is not losing weight  States his heart rate does get up to about 120 with activity although he is on a beta blocker    4. Sleep apnea-like behavior  -     SLEEP MEDICINE REFERRAL  Noted in his last hospitalization was a question of sleep apnea. Refer to sleep medicine and hopefully he can do an at home sleep study. ADRIANA could be a cause for his inability to lose weight      Health Maintenance:   Health Maintenance   Topic Date Due    DTaP/Tdap/Td series (1 - Tdap) 12/29/2006    Influenza Age 5 to Adult  08/01/2018         I have discussed the diagnosis with the patient and the intended plan as seen in the above orders. The patient has received an After-Visit Summary and questions were answered concerning future plans. An After Visit Summary was printed and given to the patient. All diagnosis have been discussed with the patient and all of the patient's questions have been answered. Follow-up Disposition:  Return in about 3 months (around 11/8/2018) for chronic conditions. Christine Stiles, ROBERTO-BC  [address] [facility] [place]   [address] [facility] [address]  [address]

## 2018-08-08 NOTE — MR AVS SNAPSHOT
Coral Ardon [facility] [place] [address] [facility] [address] [facility] [address] [id number] 
[phone] Patient: Stephanie Sewell MRN: [id number] :1985 Visit Information Date & Time Provider Department Dept. Phone Encounter #  
 2018  8:30 AM Juan Camacho, [address] [place] [address] [phone] [id number] Follow-up Instructions Return in about 3 months (around 2018) for chronic conditions. Upcoming Health Maintenance Date Due DTaP/Tdap/Td series (1 - Tdap) 2006 Influenza Age 5 to Adult 2018 Allergies as of 2018  Review Complete On: 2018 By: Juan Camacho NP No Known Allergies Current Immunizations  Reviewed on 3/23/2018 No immunizations on file. Not reviewed this visit You Were Diagnosed With   
  
 Codes Comments Obesity, morbid ([facility]    -  Primary ICD-10-CM: E66.01 
ICD-9-CM: 278.01 Essential hypertension     ICD-10-CM: I10 
ICD-9-CM: 401.9 Acquired hypothyroidism     ICD-10-CM: E03.9 ICD-9-CM: 244.9 Sleep apnea-like behavior     ICD-10-CM: G47.39 
ICD-9-CM: 780.59 Vitals BP Pulse Temp Resp Height(growth percentile) Weight(growth percentile) 107/68 69 97.7 °F (36.5 °C) (Oral) 18 5' 8\" (1.727 m) 313 lb (142 kg) BMI Smoking Status 47.59 kg/m2 Never Smoker Vitals History BMI and BSA Data Body Mass Index Body Surface Area  
 47.59 kg/m 2 2.61 m 2 Preferred Pharmacy Pharmacy Name Phone Jan [address] [place] [address] Michael Robbins Your Updated Medication List  
  
   
This list is accurate as of 18  8:57 AM.  Always use your most recent med list. amLODIPine 10 mg tablet Commonly known as:  Arlyss Iggy TAKE 1 TABLET BY MOUTH DAILY  
  
 levothyroxine 25 mcg tablet Commonly known as:  SYNTHROID  
TAKE 1 TABLET BY MOUTH DAILY BEFORE BREAKFAST lisinopril 10 mg tablet Commonly known as:  PRINIVIL, ZESTRIL  
TAKE 1 TABLET BY MOUTH DAILY  
  
 metoprolol tartrate 50 mg tablet Commonly known as:  LOPRESSOR  
TAKE 1 TABLET BY MOUTH THREE TIMES DAILY We Performed the Following SLEEP MEDICINE REFERRAL [OZI093 Custom] Comments:  
 Orders: 
Sleep Medicine Consult - Schedule patient for a sleep specialist consult. If appropriate, schedule patient for sleep study(s). Initiate treatment if needed. Forward correspondance to my office. Follow-up Instructions Return in about 3 months (around 11/8/2018) for chronic conditions. To-Do List   
 08/08/2018 Lab:  T3 TOTAL   
  
 08/08/2018 Lab:  T4, FREE   
  
 08/08/2018 Lab:  TSH 3RD GENERATION   
  
 08/08/2018 Lab:  VITAMIN D, 25 HYDROXY   
  
 08/09/2018 2:30 PM  
  Appointment with Jose Cruz Kaiser RD at [address] [place] [address] ([phone]) Referral Information Referral ID Referred By Referred To  
  
 [id number] MIKE JUÁREZ Not Available Visits Status Start Date End Date 1 New Request 8/8/18 8/8/19 If your referral has a status of pending review or denied, additional information will be sent to support the outcome of this decision. Introducing [facility]! Dear May Matson: 
Thank you for requesting a [employer] account. Our records indicate that you already have an active [employer] account. You can access your account anytime at [email or web address] Did you know that you can access your hospital and ER discharge instructions at any time in [employer]? You can also review all of your test results from your hospital stay or ER visit. Additional Information If you have questions, please visit the Frequently Asked Questions section of the [employer] website at [email or web address] Remember, [employer] is NOT to be used for urgent needs. For medical emergencies, dial 911. Now available from your iPhone and Android! Please provide this summary of care documentation to your next provider. Your primary care clinician is listed as Devon Peterson. If you have any questions after today's visit, please call [phone].

## 2018-08-09 ENCOUNTER — TELEPHONE (OUTPATIENT)
Dept: FAMILY MEDICINE CLINIC | Age: 33
End: 2018-08-09

## 2018-08-09 ENCOUNTER — APPOINTMENT (OUTPATIENT)
Dept: NUTRITION | Age: 33
End: 2018-08-09

## 2018-08-09 LAB
25(OH)D3 SERPL-MCNC: 18.7 NG/ML (ref 30–100)
T3 SERPL-MCNC: 87 NG/DL (ref 71–180)

## 2018-08-09 RX ORDER — ASPIRIN 325 MG
50000 TABLET, DELAYED RELEASE (ENTERIC COATED) ORAL
Qty: 12 CAP | Refills: 0 | Status: SHIPPED | OUTPATIENT
Start: 2018-08-09 | End: 2018-10-26

## 2018-08-09 NOTE — TELEPHONE ENCOUNTER
Called patient at [phone] and spoken to [facility]. Patient inform thyroid function is fine and vit D is low. Per Diony Hazard NP: Rx sent for Vitamin d3 50,000unit one tab by mouth once a week for 12 weeks and after that take OTC vitamin d3 2000 unit a day. Also inform patient he will need to recheck labs in 6 month. Patient verbalize  He understand and repeat direction back to me. Patient made of aware of the new medication. Also inform patient if he has any question to call the office.

## 2018-08-09 NOTE — PROGRESS NOTES
Let mr. chapa know his thyroid function is fine  His vit d is low: rx sent for vit d3 50,000 units one tablet by mouth once a week for 12 weeks then take otc vit d3 2000 units a day  Will recheck lab in about 6 months

## 2018-08-19 DIAGNOSIS — E03.9 ACQUIRED HYPOTHYROIDISM: ICD-10-CM

## 2018-08-19 RX ORDER — LEVOTHYROXINE SODIUM 25 UG/1
TABLET ORAL
Qty: 30 TAB | Refills: 0 | Status: SHIPPED | OUTPATIENT
Start: 2018-08-19 | End: 2018-09-14 | Stop reason: SDUPTHER

## 2018-08-30 ENCOUNTER — APPOINTMENT (OUTPATIENT)
Dept: NUTRITION | Age: 33
End: 2018-08-30

## 2018-09-14 DIAGNOSIS — E03.9 ACQUIRED HYPOTHYROIDISM: ICD-10-CM

## 2018-09-20 DIAGNOSIS — I10 ESSENTIAL HYPERTENSION: ICD-10-CM

## 2018-09-21 ENCOUNTER — HOSPITAL ENCOUNTER (OUTPATIENT)
Dept: NUTRITION | Age: 33
Discharge: HOME | End: 2018-09-21
Payer: COMMERCIAL

## 2018-09-21 PROCEDURE — 97802 MEDICAL NUTRITION INDIV IN: CPT

## 2018-09-21 RX ORDER — METOPROLOL TARTRATE 50 MG/1
TABLET ORAL
Qty: 90 TAB | Refills: 0 | Status: SHIPPED | OUTPATIENT
Start: 2018-09-21 | End: 2018-10-23 | Stop reason: SDUPTHER

## 2018-09-21 RX ORDER — LEVOTHYROXINE SODIUM 25 UG/1
TABLET ORAL
Qty: 30 TAB | Refills: 0 | Status: SHIPPED | OUTPATIENT
Start: 2018-09-21 | End: 2018-10-18 | Stop reason: SDUPTHER

## 2018-09-21 RX ORDER — LISINOPRIL 10 MG/1
TABLET ORAL
Qty: 30 TAB | Refills: 0 | Status: SHIPPED | OUTPATIENT
Start: 2018-09-21 | End: 2018-10-23 | Stop reason: SDUPTHER

## 2018-09-21 NOTE — PROGRESS NOTES
[facility]  [address] [facility], [place], [place] [address]  Phone: [phone]  Fax: [phone]   Nutrition Assessment - Medical Nutrition Therapy   Outpatient Initial Evaluation         Patient Name: Ryann Burleson : 1985   Treatment Diagnosis: HTN, Obesity   Referral Source: Ambreen Major NP Start of Care [facility]): 2018     Gender: male Age: 28 y.o. Ht: 67 in Wt: 210  lb  kg   BMI: 48.6 BMR   Male 2314 BMR Female      Past Medical History includes: HTN (strong family hx), hypothyroid     Pertinent Medications:   Levothyroxine, lisinopril, amlodipine, metoprolol, cholecalciferol 50,000u     Biochemical Data:   Lab Results   Component Value Date/Time    Hemoglobin A1c (POC) 5.9 2018 10:07 AM     Lab Results   Component Value Date/Time    Sodium 138 2018 12:10 PM    Potassium 4.7 2018 12:10 PM    Chloride 106 2018 12:10 PM    CO2 22 2018 12:10 PM    Anion gap 10 2018 12:10 PM    Glucose 90 2018 12:10 PM    BUN 18 2018 12:10 PM    Creatinine 1.17 2018 12:10 PM    BUN/Creatinine ratio 15 2018 12:10 PM    GFR est AA >60 2018 12:10 PM    GFR est non-AA >60 2018 12:10 PM    Calcium 9.6 2018 12:10 PM     Lab Results   Component Value Date/Time    Creatinine 1.17 2018 12:10 PM     Lab Results   Component Value Date/Time    BUN 18 2018 12:10 PM          Subjective/Assessment:   29 yo male referred for help with wt management and BG control. Was Dx with hypothroidism earlier this year and wt has not gone up since started taking med, but he does not know how long this was going on before he actually went to the doctor for lab work. He ended up taking himself to the hospital after the eye doctor saw Fletcher Grimm in his eyes and recommended he go. This was in March and he started on all of the medications he is taking at that time.  Reports he feels more off now and he did before he knew anything was wrong. He exercises 4-6 hours per week (plays basketball and does cardio and resistance train at the gym 2-3 days per week). Current Eating Patterns: Skips breakfast, trying to make better choices at lunch but never overfills, wife cooks balanced meal for dinner. They don't go out to eat much. He drinks mostly water and a little bit of juice. Does not like tomatoes or tomato sauce. Estimate Needs   Calories: 1269-0864  Protein: 125 Carbs: 225 Fat: 67   Kcal/day  g/day  g/day  g/day        percent: 25  45  30               Education & Recommendations provided: Educated pt on lean protein, non-starchy vegetable and carbohydrate food sources; counting carbohydrates, label reading, meal timing, and appropriate serving sizes. Encouraged pt to focus on balanced plate model and not skipping breakfast.   Handouts Provided: [x]  Carbohydrates  [x]  Protein  [x]  Non-starchy Vegatbles  [x]  Food Label  [x]  Meal and Snack Ideas  []  Food Journals []  Diabetes  []  Cholesterol  []  Sodium  []  Gen Nutr Guidelines  []  SBGM Guidelines  []  Others:   Information Reviewed with: pt   Readiness to Change Stage: []  Pre-contemplative    []  Contemplative  [x]  Preparation               []  Action                  []  Maintenance   Potential Barriers to Learning: []  Decline in memory    []  Language barrier   []  Other:  []  Emotional                  []  Limited mobility  []  Lack of motivation     [] Vision, hearing or cognitive impairment   Expected Compliance: Good      Nutritional Goal - To promote lifestyle changes to result in:    [x]  Weight loss  []  Improved diabetic control  []  Decreased cholesterol levels  [x]  Decreased blood pressure  []  Weight maintenance []  Preventing any interactions associated with food allergies  []  Adequate weight gain toward goal weight  []  Other:        Patient Goals:   1. Try to have something to eat within 2 hours of waking up.   2. Pair carbohydrates with protein whenever possible. 3. Try to eat 3 balanced meals per day, every 3-4 hours. 4. Aim for <60g total carb per meal and 20-40g protein per meal.     Dietitian Signature: Anisa Brunson MS, RD Date: 9/21/2018   Follow-up: Thurs.  11/15/18 at 3:30pm Time: 2:58 PM

## 2018-10-23 DIAGNOSIS — I10 ESSENTIAL HYPERTENSION: ICD-10-CM

## 2018-10-23 RX ORDER — METOPROLOL TARTRATE 50 MG/1
TABLET ORAL
Qty: 90 TAB | Refills: 0 | Status: SHIPPED | OUTPATIENT
Start: 2018-10-23 | End: 2018-11-08

## 2018-10-23 RX ORDER — LISINOPRIL 10 MG/1
TABLET ORAL
Qty: 30 TAB | Refills: 0 | Status: SHIPPED | OUTPATIENT
Start: 2018-10-23 | End: 2018-11-08

## 2018-11-08 ENCOUNTER — HOSPITAL ENCOUNTER (OUTPATIENT)
Dept: LAB | Age: 33
Discharge: HOME | End: 2018-11-08
Payer: COMMERCIAL

## 2018-11-08 ENCOUNTER — OFFICE VISIT (OUTPATIENT)
Dept: FAMILY MEDICINE CLINIC | Age: 33
End: 2018-11-08

## 2018-11-08 VITALS
SYSTOLIC BLOOD PRESSURE: 128 MMHG | WEIGHT: 312 LBS | DIASTOLIC BLOOD PRESSURE: 78 MMHG | HEART RATE: 66 BPM | TEMPERATURE: 97.5 F | HEIGHT: 68 IN | RESPIRATION RATE: 16 BRPM | BODY MASS INDEX: 47.29 KG/M2 | OXYGEN SATURATION: 98 %

## 2018-11-08 DIAGNOSIS — E03.9 ACQUIRED HYPOTHYROIDISM: ICD-10-CM

## 2018-11-08 DIAGNOSIS — E55.9 VITAMIN D DEFICIENCY: ICD-10-CM

## 2018-11-08 DIAGNOSIS — I10 ESSENTIAL HYPERTENSION: Primary | ICD-10-CM

## 2018-11-08 DIAGNOSIS — I10 ESSENTIAL HYPERTENSION: ICD-10-CM

## 2018-11-08 DIAGNOSIS — G47.33 OSA (OBSTRUCTIVE SLEEP APNEA): ICD-10-CM

## 2018-11-08 DIAGNOSIS — E66.01 OBESITY, MORBID (HCC): ICD-10-CM

## 2018-11-08 LAB
25(OH)D3 SERPL-MCNC: 54.1 NG/ML (ref 30–100)
ALBUMIN SERPL-MCNC: 4.1 G/DL (ref 3.4–5)
ALBUMIN/GLOB SERPL: 1.1 {RATIO} (ref 0.8–1.7)
ALP SERPL-CCNC: 94 U/L (ref 45–117)
ALT SERPL-CCNC: 31 U/L (ref 16–61)
ANION GAP SERPL CALC-SCNC: 9 MMOL/L (ref 3–18)
AST SERPL-CCNC: 10 U/L (ref 15–37)
BILIRUB SERPL-MCNC: 0.6 MG/DL (ref 0.2–1)
BUN SERPL-MCNC: 15 MG/DL (ref 7–18)
BUN/CREAT SERPL: 14 (ref 12–20)
CALCIUM SERPL-MCNC: 8.6 MG/DL (ref 8.5–10.1)
CHLORIDE SERPL-SCNC: 106 MMOL/L (ref 100–108)
CO2 SERPL-SCNC: 23 MMOL/L (ref 21–32)
CREAT SERPL-MCNC: 1.04 MG/DL (ref 0.6–1.3)
ERYTHROCYTE [DISTWIDTH] IN BLOOD BY AUTOMATED COUNT: 13.5 % (ref 11.6–14.5)
EST. AVERAGE GLUCOSE BLD GHB EST-MCNC: 128 MG/DL
GLOBULIN SER CALC-MCNC: 3.6 G/DL (ref 2–4)
GLUCOSE SERPL-MCNC: 101 MG/DL (ref 74–99)
HBA1C MFR BLD: 6.1 % (ref 4.2–5.6)
HCT VFR BLD AUTO: 45.9 % (ref 36–48)
HGB BLD-MCNC: 15.4 G/DL (ref 13–16)
MCH RBC QN AUTO: 29.2 PG (ref 24–34)
MCHC RBC AUTO-ENTMCNC: 33.6 G/DL (ref 31–37)
MCV RBC AUTO: 87.1 FL (ref 74–97)
PLATELET # BLD AUTO: 349 K/UL (ref 135–420)
PMV BLD AUTO: 9.8 FL (ref 9.2–11.8)
POTASSIUM SERPL-SCNC: 4.7 MMOL/L (ref 3.5–5.5)
PROT SERPL-MCNC: 7.7 G/DL (ref 6.4–8.2)
RBC # BLD AUTO: 5.27 M/UL (ref 4.7–5.5)
SODIUM SERPL-SCNC: 138 MMOL/L (ref 136–145)
TSH SERPL DL<=0.05 MIU/L-ACNC: 1.1 UIU/ML (ref 0.36–3.74)
WBC # BLD AUTO: 10 K/UL (ref 4.6–13.2)

## 2018-11-08 PROCEDURE — 36415 COLL VENOUS BLD VENIPUNCTURE: CPT

## 2018-11-08 PROCEDURE — 80053 COMPREHEN METABOLIC PANEL: CPT

## 2018-11-08 PROCEDURE — 82306 VITAMIN D 25 HYDROXY: CPT

## 2018-11-08 PROCEDURE — 83036 HEMOGLOBIN GLYCOSYLATED A1C: CPT

## 2018-11-08 PROCEDURE — 85027 COMPLETE CBC AUTOMATED: CPT

## 2018-11-08 PROCEDURE — 84443 ASSAY THYROID STIM HORMONE: CPT

## 2018-11-08 NOTE — PATIENT INSTRUCTIONS
Weight loss medication  Contrave: Wellbutrin and Naltrexone    Hypertension  Continue Lisinopril at 10mg a day  Metoprolol:   Take one tablet by mouth two times a day for one week   Then take one tablet by mouth one time a day for a week   Then stop  Monitor blood pressure, if starts to be greater than 140/90, call me  Monitor heart rate if it runs consistently greater than 100 call me     Body Mass Index: Care Instructions  Your Care Instructions    Body mass index (BMI) can help you see if your weight is raising your risk for health problems. It uses a formula to compare how much you weigh with how tall you are. · A BMI lower than 18.5 is considered underweight. · A BMI between 18.5 and 24.9 is considered healthy. · A BMI between 25 and 29.9 is considered overweight. A BMI of 30 or higher is considered obese. If your BMI is in the normal range, it means that you have a lower risk for weight-related health problems. If your BMI is in the overweight or obese range, you may be at increased risk for weight-related health problems, such as high blood pressure, heart disease, stroke, arthritis or joint pain, and diabetes. If your BMI is in the underweight range, you may be at increased risk for health problems such as fatigue, lower protection (immunity) against illness, muscle loss, bone loss, hair loss, and hormone problems. BMI is just one measure of your risk for weight-related health problems. You may be at higher risk for health problems if you are not active, you eat an unhealthy diet, or you drink too much alcohol or use tobacco products. Follow-up care is a key part of your treatment and safety. Be sure to make and go to all appointments, and call your doctor if you are having problems. It's also a good idea to know your test results and keep a list of the medicines you take. How can you care for yourself at home? · Practice healthy eating habits.  This includes eating plenty of fruits, vegetables, whole grains, lean protein, and low-fat dairy. · If your doctor recommends it, get more exercise. Walking is a good choice. Bit by bit, increase the amount you walk every day. Try for at least 30 minutes on most days of the week. · Do not smoke. Smoking can increase your risk for health problems. If you need help quitting, talk to your doctor about stop-smoking programs and medicines. These can increase your chances of quitting for good. · Limit alcohol to 2 drinks a day for men and 1 drink a day for women. Too much alcohol can cause health problems. If you have a BMI higher than 25  · Your doctor may do other tests to check your risk for weight-related health problems. This may include measuring the distance around your waist. A waist measurement of more than 40 inches in men or 35 inches in women can increase the risk of weight-related health problems. · Talk with your doctor about steps you can take to stay healthy or improve your health. You may need to make lifestyle changes to lose weight and stay healthy, such as changing your diet and getting regular exercise. If you have a BMI lower than 18.5  · Your doctor may do other tests to check your risk for health problems. · Talk with your doctor about steps you can take to stay healthy or improve your health. You may need to make lifestyle changes to gain or maintain weight and stay healthy, such as getting more healthy foods in your diet and doing exercises to build muscle. Where can you learn more? Go to http://tony-bertram.info/. Enter S176 in the search box to learn more about \"Body Mass Index: Care Instructions. \"  Current as of: October 13, 2016  Content Version: 11.4  © 1711-0868 [employer]. Care instructions adapted under license by [employer] (which disclaims liability or warranty for this information).  If you have questions about a medical condition or this instruction, always ask your healthcare professional. [address] any warranty or liability for your use of this information. Hypothyroidism: Care Instructions  Your Care Instructions    You have hypothyroidism, which means that your body is not making enough thyroid hormone. This hormone helps your body use energy. If your thyroid level is low, you may feel tired, be constipated, have an increase in your blood pressure, or have dry skin or memory problems. You may also get cold easily, even when it is warm. Women with low thyroid levels may have heavy menstrual periods. A blood test to find your thyroid-stimulating hormone (TSH) level is used to check for hypothyroidism. A high TSH level may mean that you have low thyroid. When your body is not making enough thyroid hormone, TSH levels rise in an effort to make the body produce more. The treatment for hypothyroidism is to take thyroid hormone pills. You should start to feel better in 1 to 2 weeks. But it can take several months to see changes in the TSH level. You will need regular visits with your doctor to make sure you have the right dose of medicine. Most people need treatment for the rest of their lives. You will need to see your doctor regularly to have blood tests and to make sure you are doing well. Follow-up care is a key part of your treatment and safety. Be sure to make and go to all appointments, and call your doctor if you are having problems. It's also a good idea to know your test results and keep a list of the medicines you take. How can you care for yourself at home? · Take your thyroid hormone medicine exactly as prescribed. Call your doctor if you think you are having a problem with your medicine. Most people do not have side effects if they take the right amount of medicine regularly. ? Take the medicine 30 minutes before breakfast, and do not take it with calcium, vitamins, or iron. ? Do not take extra doses of your thyroid medicine.  It will not help you get better any faster, and it may cause side effects. ? If you forget to take a dose, do NOT take a double dose of medicine. Take your usual dose the next day. · Tell your doctor about all prescription, herbal, or over-the-counter products you take. · Take care of yourself. Eat a healthy diet, get enough sleep, and get regular exercise. When should you call for help? Call 911 anytime you think you may need emergency care. For example, call if:    · You passed out (lost consciousness).     · You have severe trouble breathing.     · You have a very slow heartbeat (less than 60 beats a minute).     · You have a low body temperature (95°F or below).    Call your doctor now or seek immediate medical care if:    · You feel tired, sluggish, or weak.     · You have trouble remembering things or concentrating.     · You do not begin to feel better 2 weeks after starting your medicine.    Watch closely for changes in your health, and be sure to contact your doctor if you have any problems. Where can you learn more? Go to http://tonyAirSagebertram.info/. Enter Y295 in the search box to learn more about \"Hypothyroidism: Care Instructions. \"  Current as of: March 15, 2018  Content Version: 11.8  © 7368-8218 [employer]. Care instructions adapted under license by "[employer] (which disclaims liability or warranty for this information). If you have questions about a medical condition or this instruction, always ask your healthcare professional. [place] [address] any warranty or liability for your use of this information. Learning About Low-Carbohydrate Diets for Weight Loss  What is a low-carbohydrate diet? Low-carb diets avoid foods that are high in carbohydrate. These high-carb foods include pasta, bread, rice, cereal, fruits, and starchy vegetables. Instead, these diets usually have you eat foods that are high in fat and protein.   Many people lose weight quickly on a low-carb diet. But the early weight loss is water. People on this diet often gain the weight back after they start eating carbs again. Not all diet plans are safe or work well. A lot of the evidence shows that low-carb diets aren't healthy. That's because these diets often don't include healthy foods like fruits and vegetables. Losing weight safely means balancing protein, fat, and carbs with every meal and snack. And low-carb diets don't always provide the vitamins, minerals, and fiber you need. If you have a serious medical condition, talk to your doctor before you try any diet. These conditions include kidney disease, heart disease, type 2 diabetes, high cholesterol, and high blood pressure. If you are pregnant, it may not be safe for your baby if you are on a low-carb diet. How can you lose weight safely? You might have heard that a diet plan helped another person lose weight. But that doesn't mean that it will work for you. It is very hard to stay on a diet that includes lots of big changes in your eating habits. If you want to get to a healthy weight and stay there, making healthy lifestyle changes will often work better than dieting. These steps can help. · Make a plan for change. Work with your doctor to create a plan that is right for you. · See a dietitian. He or she can show you how to make healthy changes in your eating habits. · Manage stress. If you have a lot of stress in your life, it can be hard to focus on making healthy changes to your daily habits. · Track your food and activity. You are likely to do better at losing weight if you keep track of what you eat and what you do. Follow-up care is a key part of your treatment and safety. Be sure to make and go to all appointments, and call your doctor if you are having problems. It's also a good idea to know your test results and keep a list of the medicines you take. Where can you learn more?   Go to http://tony-bertram.info/. Enter A121 in the search box to learn more about \"Learning About Low-Carbohydrate Diets for Weight Loss. \"  Current as of: March 29, 2018  Content Version: 11.8  © 9673-2555 Healthwise[employer]. Care instructions adapted under license by [employer] (which disclaims liability or warranty for this information). If you have questions about a medical condition or this instruction, always ask your healthcare professional. [address] any warranty or liability for your use of this information.

## 2018-11-08 NOTE — PROGRESS NOTES
1. Have you been to the ER, urgent care clinic since your last visit? Hospitalized since your last visit? No    2. Have you seen or consulted any other health care providers outside of the [address] [place] [address] since your last visit? Include any pap smears or colon screening.  No     Chief Complaint   Patient presents with    Follow-up     High blood pressure

## 2018-11-08 NOTE — PROGRESS NOTES
Micaela Nayak is a 28 y.o. male and presents with Follow-up (High blood pressure )         Subjective:    Hypertension:   Patient reports taking medications as instructed. yes   No medication side effects noted. yes  Headache upon wakening. no   Home BP monitoring in range of 682/33'H systolic. Hypothyroidism  Taking medication daily  Does not feel sluggish  No heat or cold intolerance  No hair loss, no dry skin  Feels as even though he is watching his diet and exercising he is not losing weight    Obesity  Saw nutritionist, has follow up. Nutritionist felt as though he was not eating enough  Gaining weight still  Working out 3-4 times a week  Playing basketball  Still not losing weight  Light eating plan:   Stay around 2300 calories a day for weight loss   Is not counting calories, gets idea of what calories are in food but not keeping a diary    ADRIANA  Still as not had sleep study  Working a lot of hours, dad had hip replacement  Educated that ADRIANA can contribute to HTN and weight gain    Vitamin d deficiency  Taking daily doses D3    ROS:  As stated in HPI, otherwise negative. ROS    The problem list was updated as a part of today's visit. Patient Active Problem List   Diagnosis Code    Obesity, morbid ([address] E66.01    Hypertensive intracerebral hemorrhage ([facility] I61.9    Hypertension I10    Headache R51    Hx of retinal hemorrhage Z86.69    Acquired hypothyroidism E03.9    Vitamin D deficiency E55.9       The PSH, FH were reviewed.       SH:  Social History     Tobacco Use    Smoking status: Never Smoker    Smokeless tobacco: Never Used   Substance Use Topics    Alcohol use: Yes     Comment: Occaisionally    Drug use: No       Medications/Allergies:  Current Outpatient Medications on File Prior to Visit   Medication Sig Dispense Refill    lisinopril (PRINIVIL, ZESTRIL) 10 mg tablet TK 1 T PO D  0    levothyroxine (SYNTHROID) 25 mcg tablet TAKE 1 TABLET BY MOUTH DAILY BEFORE BREAKFAST 90 Tab 2    amLODIPine (NORVASC) 10 mg tablet Take 1 Tab by mouth daily. 30 Tab 2     No current facility-administered medications on file prior to visit. No Known Allergies    Objective:  Visit Vitals  /78 (BP 1 Location: Left arm, BP Patient Position: At rest)   Pulse 66   Temp 97.5 °F (36.4 °C) (Oral)   Resp 16   Ht 5' 8\" (1.727 m)   Wt 312 lb (141.5 kg)   SpO2 98%   BMI 47.44 kg/m²    Body mass index is 47.44 kg/m². Physical assessment  Physical Exam   Constitutional: He is oriented to person, place, and time and well-developed, well-nourished, and in no distress. Vital signs are normal.   Eyes: Conjunctivae are normal. Pupils are equal, round, and reactive to light. Cardiovascular: Normal rate, regular rhythm, S1 normal and S2 normal.   Pulmonary/Chest: Effort normal and breath sounds normal.   Musculoskeletal: Normal range of motion. Neurological: He is alert and oriented to person, place, and time. Gait normal.   Skin: Skin is warm, dry and intact. Nursing note and vitals reviewed. Labwork and Ancillary Studies:    CBC w/Diff  Lab Results   Component Value Date/Time    WBC 10.0 11/08/2018 09:33 AM    HGB 15.4 11/08/2018 09:33 AM    PLATELET 362 05/05/3300 09:33 AM         Basic Metabolic Profile  Lab Results   Component Value Date/Time    Sodium 138 11/08/2018 09:33 AM    Potassium 4.7 11/08/2018 09:33 AM    Chloride 106 11/08/2018 09:33 AM    CO2 23 11/08/2018 09:33 AM    Anion gap 9 11/08/2018 09:33 AM    Glucose 101 (H) 11/08/2018 09:33 AM    BUN 15 11/08/2018 09:33 AM    Creatinine 1.04 11/08/2018 09:33 AM    BUN/Creatinine ratio 14 11/08/2018 09:33 AM    GFR est AA >60 11/08/2018 09:33 AM    GFR est non-AA >60 11/08/2018 09:33 AM    Calcium 8.6 11/08/2018 09:33 AM        Cholesterol  No results found for: CHOL, CHOLX, CHLST, CHOLV, HDL, LDL, LDLC, DLDLP, TGLX, TRIGL, TRIGP, CHHD, CHHDX    Assessment/Plan:    Diagnoses and all orders for this visit:    1.  Essential hypertension  - CBC W/O DIFF; Future  -     METABOLIC PANEL, COMPREHENSIVE; Future  Well controlled, will wean off metoprolol. There have been studies which show beta blockers can contribute to weight gain. Instructed to take b/p two times a day and call for sustained b/p greater than 140/90 or HR greater than 100    2. Obesity, morbid ([address]  -     CBC W/O DIFF; Future  -     HEMOGLOBIN A1C WITH EAG; Future  -He is exercising regularly but not losing weight  -Spoke to him about the [employer] loss program and pamphlet given  -He will continue to see nutritionist    3. Acquired hypothyroidism  -     TSH 3RD GENERATION; Future    4. Vitamin D deficiency  -     VITAMIN D, 25 HYDROXY; Future    5. ADRIANA (obstructive sleep apnea)  -     SLEEP MEDICINE REFERRAL  -He was noted in his hospitalization to her sleep apnea  -Will refer again for sleep studies    Doing well other than his weight issues. His blood pressure is controlled at 128/78 today. Health Maintenance:   Health Maintenance   Topic Date Due    DTaP/Tdap/Td series (1 - Tdap) 12/29/2006    Influenza Age 5 to Adult  08/01/2018         I have discussed the diagnosis with the patient and the intended plan as seen in the above orders. The patient has received an After-Visit Summary and questions were answered concerning future plans. An After Visit Summary was printed and given to the patient. All diagnosis have been discussed with the patient and all of the patient's questions have been answered. Follow-up Disposition:  Return in about 4 weeks (around 12/6/2018). Loki Reyes, [facility]  [address]  [address]

## 2018-11-13 RX ORDER — LISINOPRIL 10 MG/1
TABLET ORAL
Refills: 0 | COMMUNITY
Start: 2018-10-23 | End: 2018-11-15 | Stop reason: SDUPTHER

## 2018-11-14 ENCOUNTER — PATIENT MESSAGE (OUTPATIENT)
Dept: FAMILY MEDICINE CLINIC | Age: 33
End: 2018-11-14

## 2018-11-14 DIAGNOSIS — I10 ESSENTIAL HYPERTENSION: Primary | ICD-10-CM

## 2018-11-15 ENCOUNTER — HOSPITAL ENCOUNTER (OUTPATIENT)
Dept: NUTRITION | Age: 33
Discharge: HOME | End: 2018-11-15
Payer: COMMERCIAL

## 2018-11-15 PROCEDURE — 97803 MED NUTRITION INDIV SUBSEQ: CPT

## 2018-11-15 RX ORDER — LISINOPRIL AND HYDROCHLOROTHIAZIDE 12.5; 2 MG/1; MG/1
1 TABLET ORAL DAILY
Qty: 30 TAB | Refills: 2 | Status: SHIPPED | OUTPATIENT
Start: 2018-11-15 | End: 2018-12-06 | Stop reason: DRUGHIGH

## 2018-11-15 NOTE — TELEPHONE ENCOUNTER
From: Gilberto Jenkins  Sent: 11/14/2018 4:01 PM EST  Subject: Non-Urgent Medical Question    Fwd to Mrs Lisa Thorne please: Today is Wednesday and I'm giving you an update. As you said on Friday 11/9 I began taking my normal pills in the AM and only one additional dose of my metoprolol eliminating they third dose. .. here are is my journal for the past few days     11/9/18  7:00pm before second pill   121/86     11/10  8:00am before morning pills   138/94 87     6:33pm before 2nd pill  119/86     11/11  9:16 before pills  139/98    6:49 before 2nd pill  122/87    11/12  7:55am Before pills   126/93    7:21pm before second pill  118/81    11/13  7:45am before pills  135/96    8:00pm  138/90    11/14  7:45am before pills  131/96    3:30  131/84    My pressure seems to be running higher than it was when taking all three doses of the metoprolol. .. and I have had a few more headaches last few days than in recent weeks. .. is this Normal because of us adjusting the meds?

## 2018-11-15 NOTE — PROGRESS NOTES
NUTRITION - FOLLOW-UP TREATMENT NOTE  Patient Name: Diana Arenas         Date: 11/15/2018  : 1985    YES Patient  Verified  Diagnosis:HTN, Obesity   In time:   3:30p        Out time:   4p   Total Treatment Time (min):   30     SUBJECTIVE/ASSESSMENT    Changes in medication or medical history? Any new allergies, surgeries or procedures? YES    If yes, update Summary List   Pt in for follow up with wife who explained that she is having difficulty feeding pt with new recommendations because he is so picky about taste and textures of foods. Pt has started eating 2-3 boiled eggs and a bagel with cream cheese for breakfast. They state that currenlty breakfast is the most challenging meal and want to discuss more of a variety of options. Detailed 7 different options he could try. Pt is willing to try everything, but if he doesn't like it he won't eat it which is expected. Wife reports that pt comes home from work starving, wanting food right away and over-portioning meals. He also feels he has to clean his plate so he is most likely overeating at dinner often. Discussed snacks that could help bridge the gap between lunch and dinner to make hunger more manageable. Pt reports that doctor recently changed BP med regimen. Took pt off of one med and increased the dose of Hctz. Current Wt: 315 Previous Wt: 315 Wt Change: --     Achievement of Goals: 1. Try to have something to eat within 2 hours of waking up. =in progress, continue  2. Pair carbohydrates with protein whenever possible. =met, continue  3. Try to eat 3 balanced meals per day, every 3-4 hours. =in progress, continue  4.  Aim for <60g total carb per meal and 20-40g protein per meal.=not met, revised         Patient Education:  [x]  Review current plan with patient   []  Other:    Handouts/  Information Provided: [x]  Carbohydrates  [x]  Protein  []  Fiber  []  Serving Sizes  []  Fluids  []  Non-starchy veg []  Diabetes  []  Cholesterol  [] Sodium  []  SBGM  []  Food Journals  [x]  Others: food label     New Patient Goals: 1. Try to add a snack in between lunch and dinner. Follow suggestions from appointment to pair carbohydrates with protein.    2. Try more variety of options for breakfast, but keep carbohydrate grams under 70g total carb per meal.      PLAN    [x]  Continue on current plan []  Follow-up PRN   []  Discharge due to :    [x]  Next appt: Wed. 12/26/18 at 11am     Dietitian: Ila Carroll MS, RD    Date: 11/15/2018 Time: 4:34 PM

## 2018-11-16 DIAGNOSIS — I10 ESSENTIAL HYPERTENSION: ICD-10-CM

## 2018-11-16 RX ORDER — LISINOPRIL 10 MG/1
TABLET ORAL
Qty: 30 TAB | Refills: 0 | OUTPATIENT
Start: 2018-11-16

## 2018-11-16 RX ORDER — METOPROLOL TARTRATE 50 MG/1
TABLET ORAL
Qty: 90 TAB | Refills: 0 | OUTPATIENT
Start: 2018-11-16

## 2018-11-16 RX ORDER — AMLODIPINE BESYLATE 10 MG/1
TABLET ORAL
Qty: 30 TAB | Refills: 0 | Status: SHIPPED | OUTPATIENT
Start: 2018-11-16 | End: 2018-12-15 | Stop reason: SDUPTHER

## 2018-12-06 ENCOUNTER — OFFICE VISIT (OUTPATIENT)
Dept: FAMILY MEDICINE CLINIC | Age: 33
End: 2018-12-06

## 2018-12-06 VITALS
HEIGHT: 68 IN | BODY MASS INDEX: 47.74 KG/M2 | OXYGEN SATURATION: 98 % | HEART RATE: 90 BPM | RESPIRATION RATE: 16 BRPM | TEMPERATURE: 97.5 F | DIASTOLIC BLOOD PRESSURE: 93 MMHG | SYSTOLIC BLOOD PRESSURE: 136 MMHG | WEIGHT: 315 LBS

## 2018-12-06 DIAGNOSIS — E03.9 ACQUIRED HYPOTHYROIDISM: ICD-10-CM

## 2018-12-06 DIAGNOSIS — I10 ESSENTIAL HYPERTENSION: Primary | ICD-10-CM

## 2018-12-06 DIAGNOSIS — E55.9 VITAMIN D DEFICIENCY: ICD-10-CM

## 2018-12-06 DIAGNOSIS — E66.01 OBESITY, MORBID (HCC): ICD-10-CM

## 2018-12-06 RX ORDER — LISINOPRIL AND HYDROCHLOROTHIAZIDE 20; 25 MG/1; MG/1
1 TABLET ORAL DAILY
Qty: 30 TAB | Refills: 2 | Status: SHIPPED | OUTPATIENT
Start: 2018-12-06 | End: 2019-03-04 | Stop reason: SDUPTHER

## 2018-12-06 NOTE — PATIENT INSTRUCTIONS
Phentermine is not an option until we get your blood pressure and heart rate under control  Contrave is the combination of wellbutrin and naltrexone     Learning About ACE Inhibitors  Introduction    ACE (angiotensin-converting enzyme) inhibitors stop the release of an enzyme. This enzyme makes your blood vessels smaller. Without it, your blood vessels relax and get bigger. This lowers your blood pressure. These medicines also increase how much water and salt go into your urine. This also lowers blood pressure. You may take this kind of medicine if you have high blood pressure. Or you may take it if you have heart problems, kidney problems, or diabetes. If you have coronary artery disease, this medicine can help prevent heart attacks and strokes. Examples  · Benazepril (Lotensin)  · Lisinopril (Prinivil, Zestril)  · Ramipril (Altace)  This is not a complete list.  Possible side effects  Side effects may include:  · A cough. · Low blood pressure. This can make you feel dizzy or weak. · Too much potassium in your body. · Swelling. This may be a sign of an allergic reaction. You may have other side effects or reactions not listed here. Check the information that comes with your medicine. What to know about taking this medicine  · ACE inhibitors can cause a dry cough. Talk to your doctor if you have a dry cough. You may need a different medicine. · These medicines can cause an allergic reaction. This can cause a little swelling. Or it can cause red bumps on your skin that hurt. In rare cases, the swelling may make it hard for you to breathe. · Do not take this medicine if you are pregnant. And don't take it if you plan to become pregnant. · Take your medicines exactly as prescribed. Call your doctor if you think you are having a problem with your medicine. · Check with your doctor or pharmacist before you use any other medicines. This includes over-the-counter medicines.  Make sure your doctor knows all of the medicines, vitamins, herbal products, and supplements you take. Taking some medicines together can cause problems. · You may need regular blood tests. Where can you learn more? Go to http://tony-bertram.info/. Enter P050 in the search box to learn more about \"Learning About ACE Inhibitors. \"  Current as of: December 6, 2017  Content Version: 11.8  © 0766-5880 [employer] Care instructions adapted under license by [email or web address] (which disclaims liability or warranty for this information). If you have questions about a medical condition or this instruction, always ask your healthcare professional. [place] [address] any warranty or liability for your use of this information. DASH Diet: Care Instructions  Your Care Instructions    The DASH diet is an eating plan that can help lower your blood pressure. DASH stands for Dietary Approaches to Stop Hypertension. Hypertension is high blood pressure. The DASH diet focuses on eating foods that are high in calcium, potassium, and magnesium. These nutrients can lower blood pressure. The foods that are highest in these nutrients are fruits, vegetables, low-fat dairy products, nuts, seeds, and legumes. But taking calcium, potassium, and magnesium supplements instead of eating foods that are high in those nutrients does not have the same effect. The DASH diet also includes whole grains, fish, and poultry. The DASH diet is one of several lifestyle changes your doctor may recommend to lower your high blood pressure. Your doctor may also want you to decrease the amount of sodium in your diet. Lowering sodium while following the DASH diet can lower blood pressure even further than just the DASH diet alone. Follow-up care is a key part of your treatment and safety. Be sure to make and go to all appointments, and call your doctor if you are having problems.  It's also a good idea to know your test results and keep a list of the medicines you take. How can you care for yourself at home? Following the DASH diet  · Eat 4 to 5 servings of fruit each day. A serving is 1 medium-sized piece of fruit, ½ cup chopped or canned fruit, 1/4 cup dried fruit, or 4 ounces (½ cup) of fruit juice. Choose fruit more often than fruit juice. · Eat 4 to 5 servings of vegetables each day. A serving is 1 cup of lettuce or raw leafy vegetables, ½ cup of chopped or cooked vegetables, or 4 ounces (½ cup) of vegetable juice. Choose vegetables more often than vegetable juice. · Get 2 to 3 servings of low-fat and fat-free dairy each day. A serving is 8 ounces of milk, 1 cup of yogurt, or 1 ½ ounces of cheese. · Eat 6 to 8 servings of grains each day. A serving is 1 slice of bread, 1 ounce of dry cereal, or ½ cup of cooked rice, pasta, or cooked cereal. Try to choose whole-grain products as much as possible. · Limit lean meat, poultry, and fish to 2 servings each day. A serving is 3 ounces, about the size of a deck of cards. · Eat 4 to 5 servings of nuts, seeds, and legumes (cooked dried beans, lentils, and split peas) each week. A serving is 1/3 cup of nuts, 2 tablespoons of seeds, or ½ cup of cooked beans or peas. · Limit fats and oils to 2 to 3 servings each day. A serving is 1 teaspoon of vegetable oil or 2 tablespoons of salad dressing. · Limit sweets and added sugars to 5 servings or less a week. A serving is 1 tablespoon jelly or jam, ½ cup sorbet, or 1 cup of lemonade. · Eat less than 2,300 milligrams (mg) of sodium a day. If you limit your sodium to 1,500 mg a day, you can lower your blood pressure even more. Tips for success  · Start small. Do not try to make dramatic changes to your diet all at once. You might feel that you are missing out on your favorite foods and then be more likely to not follow the plan. Make small changes, and stick with them. Once those changes become habit, add a few more changes.   · Try some of the following:  ? Make it a goal to eat a fruit or vegetable at every meal and at snacks. This will make it easy to get the recommended amount of fruits and vegetables each day. ? Try yogurt topped with fruit and nuts for a snack or healthy dessert. ? Add lettuce, tomato, cucumber, and onion to sandwiches. ? Combine a ready-made pizza crust with low-fat mozzarella cheese and lots of vegetable toppings. Try using tomatoes, squash, spinach, broccoli, carrots, cauliflower, and onions. ? Have a variety of cut-up vegetables with a low-fat dip as an appetizer instead of chips and dip. ? Sprinkle sunflower seeds or chopped almonds over salads. Or try adding chopped walnuts or almonds to cooked vegetables. ? Try some vegetarian meals using beans and peas. Add garbanzo or kidney beans to salads. Make burritos and tacos with mashed browne beans or black beans. Where can you learn more? Go to http://tonygAutobertram.info/. Enter J079 in the search box to learn more about \"DASH Diet: Care Instructions. \"  Current as of: December 6, 2017  Content Version: 11.8  © 6491-6457 [employer]. Care instructions adapted under license by [employer] (which disclaims liability or warranty for this information). If you have questions about a medical condition or this instruction, always ask your healthcare professional. [place] [address] any warranty or liability for your use of this information. Learning About Diuretics for High Blood Pressure  Introduction  Diuretics help to lower blood pressure. This reduces your risk of a heart attack and stroke. It also reduces your risk of kidney disease. Diuretics cause your kidneys to remove sodium and water. They also relax the blood vessel walls. These help lower your blood pressure. Examples  · Chlorthalidone  · Hydrochlorothiazide  Possible side effects  There are some common side effects.  They are:  · Too little potassium. · Feeling dizzy. · Rash. · Urinating a lot. · High blood sugar. (But this is not common.)  You may have other side effects. Check the information that comes with your medicine. What to know about taking this medicine  · You may take other medicines for blood pressure. Diuretics can help those work better. They can also prevent extra fluid in your body. · You may need to take potassium pills. Or you may have to watch how much potassium is in your food. Ask your doctor about this. · You may need blood tests to check your kidneys and your potassium level. · Take your medicines exactly as prescribed. Call your doctor if you think you are having a problem with your medicine. · Check with your doctor or pharmacist before you use any other medicines. This includes over-the-counter medicines. Make sure your doctor knows all of the medicines, vitamins, herbal products, and supplements you take. Taking some medicines together can cause problems. Where can you learn more? Go to http://tony-bertram.info/. Enter U700 in the search box to learn more about \"Learning About Diuretics for High Blood Pressure. \"  Current as of: December 6, 2017  Content Version: 11.8  © 9460-7231 [employer]. Care instructions adapted under license by [employer] (which disclaims liability or warranty for this information). If you have questions about a medical condition or this instruction, always ask your healthcare professional. [address] any warranty or liability for your use of this information. Learning About Low-Carbohydrate Diets for Weight Loss  What is a low-carbohydrate diet? Low-carb diets avoid foods that are high in carbohydrate. These high-carb foods include pasta, bread, rice, cereal, fruits, and starchy vegetables. Instead, these diets usually have you eat foods that are high in fat and protein.   Many people lose weight quickly on a low-carb diet. But the early weight loss is water. People on this diet often gain the weight back after they start eating carbs again. Not all diet plans are safe or work well. A lot of the evidence shows that low-carb diets aren't healthy. That's because these diets often don't include healthy foods like fruits and vegetables. Losing weight safely means balancing protein, fat, and carbs with every meal and snack. And low-carb diets don't always provide the vitamins, minerals, and fiber you need. If you have a serious medical condition, talk to your doctor before you try any diet. These conditions include kidney disease, heart disease, type 2 diabetes, high cholesterol, and high blood pressure. If you are pregnant, it may not be safe for your baby if you are on a low-carb diet. How can you lose weight safely? You might have heard that a diet plan helped another person lose weight. But that doesn't mean that it will work for you. It is very hard to stay on a diet that includes lots of big changes in your eating habits. If you want to get to a healthy weight and stay there, making healthy lifestyle changes will often work better than dieting. These steps can help. · Make a plan for change. Work with your doctor to create a plan that is right for you. · See a dietitian. He or she can show you how to make healthy changes in your eating habits. · Manage stress. If you have a lot of stress in your life, it can be hard to focus on making healthy changes to your daily habits. · Track your food and activity. You are likely to do better at losing weight if you keep track of what you eat and what you do. Follow-up care is a key part of your treatment and safety. Be sure to make and go to all appointments, and call your doctor if you are having problems. It's also a good idea to know your test results and keep a list of the medicines you take. Where can you learn more?   Go to http://tony-bertram.info/. Enter A121 in the search box to learn more about \"Learning About Low-Carbohydrate Diets for Weight Loss. \"  Current as of: March 29, 2018  Content Version: 11.8  © 7610-6712 [employer]. Care instructions adapted under license by [facility] (which disclaims liability or warranty for this information). If you have questions about a medical condition or this instruction, always ask your healthcare professional. [address] any warranty or liability for your use of this information.

## 2018-12-06 NOTE — PROGRESS NOTES
1. Have you been to the ER, urgent care clinic since your last visit? Hospitalized since your last visit? No    2. Have you seen or consulted any other health care providers outside of the [address] [place] [address] since your last visit? Include any pap smears or colon screening.  No     Chief Complaint   Patient presents with    Follow-up     High blood pressure

## 2018-12-06 NOTE — PROGRESS NOTES
Jasmyne Hartman is a 28 y.o. male and presents with Follow-up (High blood pressure )         Subjective:    Still on two lopressors a day  At about 6-7pm starts to have heart rate elevation    Working out 3-4 days a week  Walking treadmlll, hr up to 150's, doing HIIT for about 30min  Then plays with son for about an hour  Has lost 3 pounds in since thankgiving    ROS:     Review of Systems   Constitutional: Negative. Respiratory: Negative. Cardiovascular: Negative. Neurological: Negative. The problem list was updated as a part of today's visit. Patient Active Problem List   Diagnosis Code    Obesity, morbid ([facility] [address] E66.01    Hypertensive intracerebral hemorrhage ([facility] [address] I61.9    Hypertension I10    Headache R51    Hx of retinal hemorrhage Z86.69    Acquired hypothyroidism E03.9    Vitamin D deficiency E55.9       The PSH, FH were reviewed. SH:  Social History     Tobacco Use    Smoking status: Never Smoker    Smokeless tobacco: Never Used   Substance Use Topics    Alcohol use: Yes     Comment: Occaisionally    Drug use: No       Medications/Allergies:  Current Outpatient Medications on File Prior to Visit   Medication Sig Dispense Refill    amLODIPine (NORVASC) 10 mg tablet TAKE 1 TABLET BY MOUTH DAILY 30 Tab 0    levothyroxine (SYNTHROID) 25 mcg tablet TAKE 1 TABLET BY MOUTH DAILY BEFORE BREAKFAST 90 Tab 2     No current facility-administered medications on file prior to visit. No Known Allergies    Objective:  Visit Vitals  BP (!) 136/93   Pulse 90   Temp 97.5 °F (36.4 °C)   Resp 16   Ht 5' 8\" (1.727 m)   Wt 322 lb (146.1 kg)   SpO2 98%   BMI 48.96 kg/m²    Body mass index is 48.96 kg/m². Physical assessment  Physical Exam   Constitutional: He is oriented to person, place, and time and well-developed, well-nourished, and in no distress. Cardiovascular: Normal rate, regular rhythm and normal heart sounds.    Pulmonary/Chest: Effort normal and breath sounds normal. Neurological: He is alert and oriented to person, place, and time. Gait normal.   Nursing note and vitals reviewed. Labwork and Ancillary Studies:    CBC w/Diff  Lab Results   Component Value Date/Time    WBC 10.0 11/08/2018 09:33 AM    HGB 15.4 11/08/2018 09:33 AM    PLATELET 055 26/19/5284 09:33 AM         Basic Metabolic Profile  Lab Results   Component Value Date/Time    Sodium 138 11/08/2018 09:33 AM    Potassium 4.7 11/08/2018 09:33 AM    Chloride 106 11/08/2018 09:33 AM    CO2 23 11/08/2018 09:33 AM    Anion gap 9 11/08/2018 09:33 AM    Glucose 101 (H) 11/08/2018 09:33 AM    BUN 15 11/08/2018 09:33 AM    Creatinine 1.04 11/08/2018 09:33 AM    BUN/Creatinine ratio 14 11/08/2018 09:33 AM    GFR est AA >60 11/08/2018 09:33 AM    GFR est non-AA >60 11/08/2018 09:33 AM    Calcium 8.6 11/08/2018 09:33 AM        Cholesterol  No results found for: CHOL, CHOLX, CHLST, CHOLV, HDL, LDL, LDLC, DLDLP, TGLX, TRIGL, TRIGP, CHHD, CHHDX    Assessment/Plan:    Diagnoses and all orders for this visit:    1. Essential hypertension  -     LIPID PANEL; Future  -     lisinopril-hydroCHLOROthiazide (PRINZIDE, ZESTORETIC) 20-25 mg per tablet; Take 1 Tab by mouth daily.  -     METABOLIC PANEL, COMPREHENSIVE; Future    2. Obesity, morbid ([address]  -     LIPID PANEL; Future  -     METABOLIC PANEL, COMPREHENSIVE; Future  -     HEMOGLOBIN A1C WITH EAG; Future    3. Acquired hypothyroidism  -     TSH 3RD GENERATION; Future  -     T4, FREE; Future    4.  Vitamin D deficiency  -     VITAMIN D, 25 HYDROXY; Future    blood pressure still slightly elevated 136/93, increase HCTZ  Suggested he taper off metoprolol more slowly by decreasing the evening dose in half at first instead of the whole dose at once  Decreasing the lopressor has been beneficial for him, states he can get his heart rate up easier when exercising and he has now been able to lose weight, 3 pounds in two weeks      Health Maintenance:   Health Maintenance   Topic Date Due  DTaP/Tdap/Td series (1 - Tdap) 12/29/2006    Influenza Age 5 to Adult  08/01/2018         I have discussed the diagnosis with the patient and the intended plan as seen in the above orders. The patient has received an After-Visit Summary and questions were answered concerning future plans. An After Visit Summary was printed and given to the patient. All diagnosis have been discussed with the patient and all of the patient's questions have been answered. Follow-up Disposition:  Return in about 3 months (around 3/6/2019) for chronic conditions. Jonn Thompson, AGNP-BC  [address] [facility] [place], [place]   [address] [facility] [address]  [id number]

## 2018-12-26 ENCOUNTER — APPOINTMENT (OUTPATIENT)
Dept: NUTRITION | Age: 33
End: 2018-12-26

## 2019-03-04 DIAGNOSIS — I10 ESSENTIAL HYPERTENSION: ICD-10-CM

## 2019-03-04 RX ORDER — LISINOPRIL AND HYDROCHLOROTHIAZIDE 20; 25 MG/1; MG/1
TABLET ORAL
Qty: 30 TAB | Refills: 0 | Status: SHIPPED | OUTPATIENT
Start: 2019-03-04 | End: 2019-03-29 | Stop reason: SDUPTHER

## 2019-03-26 ENCOUNTER — HOSPITAL ENCOUNTER (OUTPATIENT)
Dept: LAB | Age: 34
Discharge: HOME | End: 2019-03-26
Payer: COMMERCIAL

## 2019-03-26 ENCOUNTER — OFFICE VISIT (OUTPATIENT)
Dept: FAMILY MEDICINE CLINIC | Age: 34
End: 2019-03-26

## 2019-03-26 VITALS
TEMPERATURE: 97.6 F | HEART RATE: 80 BPM | HEIGHT: 68 IN | RESPIRATION RATE: 16 BRPM | OXYGEN SATURATION: 98 % | WEIGHT: 315 LBS | SYSTOLIC BLOOD PRESSURE: 128 MMHG | DIASTOLIC BLOOD PRESSURE: 83 MMHG | BODY MASS INDEX: 47.74 KG/M2

## 2019-03-26 DIAGNOSIS — I10 ESSENTIAL HYPERTENSION: ICD-10-CM

## 2019-03-26 DIAGNOSIS — E66.01 OBESITY, MORBID (HCC): ICD-10-CM

## 2019-03-26 DIAGNOSIS — E03.9 ACQUIRED HYPOTHYROIDISM: ICD-10-CM

## 2019-03-26 DIAGNOSIS — E55.9 VITAMIN D DEFICIENCY: ICD-10-CM

## 2019-03-26 DIAGNOSIS — I10 ESSENTIAL HYPERTENSION: Primary | ICD-10-CM

## 2019-03-26 PROBLEM — I61.9 HYPERTENSIVE INTRACEREBRAL HEMORRHAGE (HCC): Status: RESOLVED | Noted: 2018-02-01 | Resolved: 2019-03-26

## 2019-03-26 PROBLEM — Z86.69: Status: RESOLVED | Noted: 2018-02-08 | Resolved: 2019-03-26

## 2019-03-26 PROBLEM — R51.9 HEADACHE: Status: RESOLVED | Noted: 2018-01-01 | Resolved: 2019-03-26

## 2019-03-26 LAB
25(OH)D3 SERPL-MCNC: 34.2 NG/ML (ref 30–100)
ALBUMIN SERPL-MCNC: 4.1 G/DL (ref 3.4–5)
ALBUMIN/GLOB SERPL: 1.2 {RATIO} (ref 0.8–1.7)
ALP SERPL-CCNC: 97 U/L (ref 45–117)
ALT SERPL-CCNC: 28 U/L (ref 16–61)
ANION GAP SERPL CALC-SCNC: 8 MMOL/L (ref 3–18)
AST SERPL-CCNC: 12 U/L (ref 15–37)
BILIRUB SERPL-MCNC: 0.8 MG/DL (ref 0.2–1)
BUN SERPL-MCNC: 17 MG/DL (ref 7–18)
BUN/CREAT SERPL: 15 (ref 12–20)
CALCIUM SERPL-MCNC: 8.7 MG/DL (ref 8.5–10.1)
CHLORIDE SERPL-SCNC: 102 MMOL/L (ref 100–108)
CHOLEST SERPL-MCNC: 151 MG/DL
CO2 SERPL-SCNC: 25 MMOL/L (ref 21–32)
CREAT SERPL-MCNC: 1.1 MG/DL (ref 0.6–1.3)
ERYTHROCYTE [DISTWIDTH] IN BLOOD BY AUTOMATED COUNT: 13.6 % (ref 11.6–14.5)
GLOBULIN SER CALC-MCNC: 3.4 G/DL (ref 2–4)
GLUCOSE SERPL-MCNC: 100 MG/DL (ref 74–99)
HCT VFR BLD AUTO: 44.6 % (ref 36–48)
HDLC SERPL-MCNC: 38 MG/DL (ref 40–60)
HDLC SERPL: 4 {RATIO} (ref 0–5)
HGB BLD-MCNC: 14.7 G/DL (ref 13–16)
LDLC SERPL CALC-MCNC: 94 MG/DL (ref 0–100)
LIPID PROFILE,FLP: ABNORMAL
MCH RBC QN AUTO: 28.4 PG (ref 24–34)
MCHC RBC AUTO-ENTMCNC: 33 G/DL (ref 31–37)
MCV RBC AUTO: 86.3 FL (ref 74–97)
PLATELET # BLD AUTO: 366 K/UL (ref 135–420)
PMV BLD AUTO: 9.6 FL (ref 9.2–11.8)
POTASSIUM SERPL-SCNC: 4.3 MMOL/L (ref 3.5–5.5)
PROT SERPL-MCNC: 7.5 G/DL (ref 6.4–8.2)
RBC # BLD AUTO: 5.17 M/UL (ref 4.7–5.5)
SODIUM SERPL-SCNC: 135 MMOL/L (ref 136–145)
TRIGL SERPL-MCNC: 95 MG/DL (ref ?–150)
TSH SERPL DL<=0.05 MIU/L-ACNC: 1 UIU/ML (ref 0.36–3.74)
VLDLC SERPL CALC-MCNC: 19 MG/DL
WBC # BLD AUTO: 11.7 K/UL (ref 4.6–13.2)

## 2019-03-26 PROCEDURE — 36415 COLL VENOUS BLD VENIPUNCTURE: CPT

## 2019-03-26 PROCEDURE — 82306 VITAMIN D 25 HYDROXY: CPT

## 2019-03-26 PROCEDURE — 80053 COMPREHEN METABOLIC PANEL: CPT

## 2019-03-26 PROCEDURE — 84443 ASSAY THYROID STIM HORMONE: CPT

## 2019-03-26 PROCEDURE — 85027 COMPLETE CBC AUTOMATED: CPT

## 2019-03-26 PROCEDURE — 80061 LIPID PANEL: CPT

## 2019-03-26 RX ORDER — GLUCOSAMINE SULFATE 1500 MG
POWDER IN PACKET (EA) ORAL DAILY
COMMUNITY

## 2019-03-26 NOTE — PATIENT INSTRUCTIONS
Home Blood Pressure Test: About This Test 
What is it? A home blood pressure test allows you to keep track of your blood pressure at home. Blood pressure is a measure of the force of blood against the walls of your arteries. Blood pressure readings include two numbers, such as 130/80 (say \"130 over 80\"). The first number is the systolic pressure. The second number is the diastolic pressure. Why is this test done? You may do this test at home to: · Find out if you have high blood pressure. · Track your blood pressure if you have high blood pressure. · Track how well medicine is working to reduce high blood pressure. · Check how lifestyle changes, such as weight loss and exercise, are affecting blood pressure. How can you prepare for the test? 
· Do not use caffeine, tobacco, or medicines known to raise blood pressure (such as nasal decongestant sprays) for at least 30 minutes before taking your blood pressure. · Do not exercise for at least 30 minutes before taking your blood pressure. What happens before the test? 
Take your blood pressure while you feel comfortable and relaxed. Sit quietly with both feet on the floor for at least 5 minutes before the test. 
What happens during the test? 
· Sit with your arm slightly bent and resting on a table so that your upper arm is at the same level as your heart. · Roll up your sleeve or take off your shirt to expose your upper arm. · Wrap the blood pressure cuff around your upper arm so that the lower edge of the cuff is about 1 inch above the bend of your elbow. Proceed with the following steps depending on if you are using an automatic or manual pressure monitor. Automatic blood pressure monitors · Press the on/off button on the automatic monitor and wait until the ready-to-measure \"heart\" symbol appears next to zero in the display window. · Press the start button. The cuff will inflate and deflate by itself. · Your blood pressure numbers will appear on the screen. · Write your numbers in your log book, along with the date and time. Manual blood pressure monitors · Place the earpieces of a stethoscope in your ears, and place the bell of the stethoscope over the artery, just below the cuff. · Close the valve on the rubber inflating bulb. · Squeeze the bulb rapidly with your opposite hand to inflate the cuff until the dial or column of mercury reads about 30 mm Hg higher than your usual systolic pressure. If you do not know your usual pressure, inflate the cuff to 210 mm Hg or until the pulse at your wrist disappears. · Open the pressure valve just slightly by twisting or pressing the valve on the bulb. · As you watch the pressure slowly fall, note the level on the dial at which you first start to hear a pulsing or tapping sound through the stethoscope. This is your systolic blood pressure. · Continue letting the air out slowly. The sounds will become muffled and will finally disappear. Note the pressure when the sounds completely disappear. This is your diastolic blood pressure. Let out all the remaining air. · Write your numbers in your log book, along with the date and time. What else should you know about the test? 
It is more accurate to take the average of several readings made throughout the day than to rely on a single reading. It's normal for blood pressure to go up and down throughout the day. Follow-up care is a key part of your treatment and safety. Be sure to make and go to all appointments, and call your doctor if you are having problems. It's also a good idea to keep a list of the medicines you take. Where can you learn more? Go to http://tony-bertram.info/. Enter C427 in the search box to learn more about \"Home Blood Pressure Test: About This Test.\" Current as of: July 22, 2018 Content Version: 11.9 © 4117-0327 [employer].  Care instructions adapted under license by [address] (which disclaims liability or warranty for this information). If you have questions about a medical condition or this instruction, always ask your healthcare professional. [address] any warranty or liability for your use of this information.

## 2019-03-26 NOTE — PROGRESS NOTES
Deon Frankie [address] 
             [phone] Samara Perez is a 35 y.o. male and presents with Follow Up Chronic Condition (HTN, Hypothyroidism ) Subjective: Hypertension: 
 Patient reports taking medications as instructed. yes Medication side effects noted. no Headache upon wakening. no 
 Home BP monitoring in range of 275-216'N systolic. No results found for: CREAU, MCAU2, MCACR  
 
[place] Appointment with sleep doctor on 4/16 Ankle injury 
r ankle, \"rolled\" while playing basketball Did not see treatment Ice elevated air splint Eye exam 
Prescription changed a little bit Vessels look good He will ask to have the report sent to me ROS: 
As stated in HPI, otherwise negative. ROS The problem list was updated as a part of today's visit. Patient Active Problem List  
Diagnosis Code  Obesity, morbid ([facility] [address] E66.01  
 Hypertension I10  
 Acquired hypothyroidism E03.9  Vitamin D deficiency E55.9 The PSH, FH were reviewed. SH: Social History Tobacco Use  Smoking status: Never Smoker  Smokeless tobacco: Never Used Substance Use Topics  Alcohol use: Yes Comment: Occaisionally  Drug use: No  
 
 
Medications/Allergies: 
Current Outpatient Medications on File Prior to Visit Medication Sig Dispense Refill  cholecalciferol (VITAMIN D3) 1,000 unit cap Take  by mouth daily.  lisinopril-hydroCHLOROthiazide (PRINZIDE, ZESTORETIC) 20-25 mg per tablet TAKE 1 TABLET BY MOUTH DAILY 30 Tab 0  
 amLODIPine (NORVASC) 10 mg tablet TAKE 1 TABLET BY MOUTH DAILY 30 Tab 2  
 levothyroxine (SYNTHROID) 25 mcg tablet TAKE 1 TABLET BY MOUTH DAILY BEFORE BREAKFAST 90 Tab 2 No current facility-administered medications on file prior to visit. No Known Allergies Objective: 
Visit Vitals /83 (BP 1 Location: Left arm, BP Patient Position: At rest) Pulse 80 Temp 97.6 °F (36.4 °C) Resp 16  
 Ht 5' 8\" (1.727 m) Wt 317 lb (143.8 kg) SpO2 98% BMI 48.20 kg/m² Body mass index is 48.2 kg/m². Physical assessment Physical Exam  
Constitutional: He is oriented to person, place, and time and well-developed, well-nourished, and in no distress. Neck: Normal range of motion. No JVD present. Cardiovascular: Normal rate, regular rhythm, normal heart sounds and intact distal pulses. Exam reveals no gallop and no friction rub. No murmur heard. Pulmonary/Chest: Effort normal and breath sounds normal.  
Neurological: He is alert and oriented to person, place, and time. Skin: Skin is warm and dry. Psychiatric: Memory, affect and judgment normal.  
Nursing note and vitals reviewed. Labwork and Ancillary Studies: CBC w/Diff Lab Results Component Value Date/Time WBC 10.0 11/08/2018 09:33 AM  
 HGB 15.4 11/08/2018 09:33 AM  
 PLATELET 016 49/02/0008 09:33 AM  
  
 
 Basic Metabolic Profile Lab Results Component Value Date/Time Sodium 138 11/08/2018 09:33 AM  
 Potassium 4.7 11/08/2018 09:33 AM  
 Chloride 106 11/08/2018 09:33 AM  
 CO2 23 11/08/2018 09:33 AM  
 Anion gap 9 11/08/2018 09:33 AM  
 Glucose 101 (H) 11/08/2018 09:33 AM  
 BUN 15 11/08/2018 09:33 AM  
 Creatinine 1.04 11/08/2018 09:33 AM  
 BUN/Creatinine ratio 14 11/08/2018 09:33 AM  
 GFR est AA >60 11/08/2018 09:33 AM  
 GFR est non-AA >60 11/08/2018 09:33 AM  
 Calcium 8.6 11/08/2018 09:33 AM  
  
 
Cholesterol No results found for: CHOL, CHOLX, CHLST, CHOLV, HDL, LDL, LDLC, DLDLP, TGLX, TRIGL, TRIGP, CHHD, CHHDX Assessment/Plan:   
Diagnoses and all orders for this visit: 1. Essential hypertension 
-     CBC W/O DIFF; Future -     METABOLIC PANEL, COMPREHENSIVE; Future 2. Obesity, morbid ([address] 
-     CBC W/O DIFF; Future -     LIPID PANEL; Future 3. Vitamin D deficiency 
-     VITAMIN D, 25 HYDROXY; Future 4. Acquired hypothyroidism 
-     TSH 3RD GENERATION; Future Blood pressure well controlled, continue same medications, labs today He is exercising and watching what he eats. Currently at a loss of 5 pounds from last visit. He states he had lost more but injured his ankle and this set him back. Annual physical next visit Health Maintenance:  
Health Maintenance Topic Date Due  
 DTaP/Tdap/Td series (1 - Tdap) 12/29/2006  Influenza Age 5 to Adult  08/01/2018  Pneumococcal 0-64 years  Aged Out I have discussed the diagnosis with the patient and the intended plan as seen in the above orders. The patient has received an After-Visit Summary and questions were answered concerning future plans. An After Visit Summary was printed and given to the patient. All diagnosis have been discussed with the patient and all of the patient's questions have been answered. Follow-up and Dispositions · Return in about 4 months (around 7/26/2019) for Annual physical, chronic conditions, 30 minutes. Kimi Mcgowan, AGNP- [facility] [address] [place] [address]. [phone]

## 2019-03-26 NOTE — PROGRESS NOTES
1. Have you been to the ER, urgent care clinic since your last visit? Hospitalized since your last visit? No 
 
2. Have you seen or consulted any other health care providers outside of the [address] [place] [address] since your last visit? Include any pap smears or colon screening. No  
 
 
Chief Complaint Patient presents with  Follow Up Chronic Condition HTN, Hypothyroidism Visit Vitals /83 (BP 1 Location: Left arm, BP Patient Position: At rest) Pulse 80 Temp 97.6 °F (36.4 °C) Resp 16 Ht 5' 8\" (1.727 m) Wt 317 lb (143.8 kg) SpO2 98% BMI 48.20 kg/m²

## 2019-03-29 DIAGNOSIS — I10 ESSENTIAL HYPERTENSION: ICD-10-CM

## 2019-03-29 RX ORDER — AMLODIPINE BESYLATE 10 MG/1
TABLET ORAL
Qty: 30 TAB | Refills: 0 | Status: SHIPPED | OUTPATIENT
Start: 2019-03-29 | End: 2019-04-29 | Stop reason: SDUPTHER

## 2019-03-29 RX ORDER — LISINOPRIL AND HYDROCHLOROTHIAZIDE 20; 25 MG/1; MG/1
TABLET ORAL
Qty: 30 TAB | Refills: 0 | Status: SHIPPED | OUTPATIENT
Start: 2019-03-29 | End: 2019-04-29 | Stop reason: SDUPTHER

## 2019-07-27 DIAGNOSIS — E03.9 ACQUIRED HYPOTHYROIDISM: ICD-10-CM

## 2019-07-28 RX ORDER — LEVOTHYROXINE SODIUM 25 UG/1
TABLET ORAL
Qty: 90 TAB | Refills: 0 | Status: SHIPPED | OUTPATIENT
Start: 2019-07-28 | End: 2019-10-30 | Stop reason: SDUPTHER

## 2019-08-02 ENCOUNTER — OFFICE VISIT (OUTPATIENT)
Dept: FAMILY MEDICINE CLINIC | Age: 34
End: 2019-08-02

## 2019-08-02 VITALS
HEART RATE: 100 BPM | TEMPERATURE: 98.5 F | DIASTOLIC BLOOD PRESSURE: 70 MMHG | OXYGEN SATURATION: 96 % | SYSTOLIC BLOOD PRESSURE: 105 MMHG | BODY MASS INDEX: 46.74 KG/M2 | RESPIRATION RATE: 18 BRPM | WEIGHT: 308.4 LBS | HEIGHT: 68 IN

## 2019-08-02 DIAGNOSIS — R73.03 PREDIABETES: ICD-10-CM

## 2019-08-02 DIAGNOSIS — E66.01 OBESITY, MORBID (HCC): ICD-10-CM

## 2019-08-02 DIAGNOSIS — I10 ESSENTIAL HYPERTENSION: ICD-10-CM

## 2019-08-02 DIAGNOSIS — E03.9 ACQUIRED HYPOTHYROIDISM: ICD-10-CM

## 2019-08-02 DIAGNOSIS — E55.9 VITAMIN D DEFICIENCY: ICD-10-CM

## 2019-08-02 DIAGNOSIS — Z00.00 ROUTINE MEDICAL EXAM: Primary | ICD-10-CM

## 2019-08-02 NOTE — PROGRESS NOTES
Deon               Frankie Pickering               [phone]    Subjective:   Diamond Quintana is a 35 y.o. male presenting for his annual checkup. ROS:  Feeling well. No dyspnea or chest pain on exertion. No abdominal pain, change in bowel habits, black or bloody stools. No urinary tract or prostatic symptoms. No neurological complaints. No HEENT symptoms. No skin symptoms. No musculoskeltetal symptoms. No depression anxiety, insomnia. Obesity  Has not worked out in two months  Not eating much fast food  Eating habits are better  Weight is going down    Hypertension:   Patient reports taking medications as instructed. yes   Medication side effects noted. no  Headache upon wakening. no   Home BP monitoring in range of 347/57'P systolic. No results found for: Effie Esquivel [facility]        Patient Active Problem List   Diagnosis Code    Obesity, morbid ([facility] [address] E66.01    Hypertension I10    Acquired hypothyroidism E03.9    Vitamin D deficiency E55.9     Patient Active Problem List    Diagnosis Date Noted    Vitamin D deficiency 11/08/2018    Acquired hypothyroidism 08/08/2018    Obesity, morbid ([facility] [address] 02/01/2018    Hypertension 02/01/2018     Current Outpatient Medications   Medication Sig Dispense Refill    levothyroxine (SYNTHROID) 25 mcg tablet TAKE 1 TABLET BY MOUTH DAILY BEFORE BREAKFAST 90 Tab 0    lisinopril-hydroCHLOROthiazide (PRINZIDE, ZESTORETIC) 20-25 mg per tablet TAKE 1 TABLET BY MOUTH DAILY 30 Tab 3    amLODIPine (NORVASC) 10 mg tablet TAKE 1 TABLET BY MOUTH DAILY 30 Tab 3    cholecalciferol (VITAMIN D3) 1,000 unit cap Take  by mouth daily.        No Known Allergies  Past Medical History:   Diagnosis Date    Arthritis     Headache 2018    htn with retinal hemmorhage     Hypertension 02/2018    with ICH     Past Surgical History:   Procedure Laterality Date    HX ORTHOPAEDIC      Hand Surgery     Family History   Problem Relation Age of Onset    Hypertension Mother 48    Diabetes Father     Hypertension Father 36     Social History     Tobacco Use    Smoking status: Never Smoker    Smokeless tobacco: Never Used   Substance Use Topics    Alcohol use: Yes     Comment: Occaisionally        Lab Results   Component Value Date/Time    WBC 11.7 03/26/2019 09:23 AM    HGB 14.7 03/26/2019 09:23 AM    HCT 44.6 03/26/2019 09:23 AM    PLATELET 122 39/25/6196 09:23 AM    MCV 86.3 03/26/2019 09:23 AM     Lab Results   Component Value Date/Time    Cholesterol, total 151 03/26/2019 09:23 AM    HDL Cholesterol 38 (L) 03/26/2019 09:23 AM    LDL, calculated 94 03/26/2019 09:23 AM    Triglyceride 95 03/26/2019 09:23 AM    CHOL/HDL Ratio 4.0 03/26/2019 09:23 AM     Lab Results   Component Value Date/Time    Sodium 135 (L) 03/26/2019 09:23 AM    Potassium 4.3 03/26/2019 09:23 AM    Chloride 102 03/26/2019 09:23 AM    CO2 25 03/26/2019 09:23 AM    Anion gap 8 03/26/2019 09:23 AM    Glucose 100 (H) 03/26/2019 09:23 AM    BUN 17 03/26/2019 09:23 AM    Creatinine 1.10 03/26/2019 09:23 AM    BUN/Creatinine ratio 15 03/26/2019 09:23 AM    GFR est AA >60 03/26/2019 09:23 AM    GFR est non-AA >60 03/26/2019 09:23 AM    Calcium 8.7 03/26/2019 09:23 AM    Bilirubin, total 0.8 03/26/2019 09:23 AM    ALT (SGPT) 28 03/26/2019 09:23 AM    AST (SGOT) 12 (L) 03/26/2019 09:23 AM    Alk. phosphatase 97 03/26/2019 09:23 AM    Protein, total 7.5 03/26/2019 09:23 AM    Albumin 4.1 03/26/2019 09:23 AM    Globulin 3.4 03/26/2019 09:23 AM    A-G Ratio 1.2 03/26/2019 09:23 AM      Lab Results   Component Value Date/Time    Hemoglobin A1c 6.1 (H) 11/08/2018 09:33 AM    Hemoglobin A1c (POC) 5.9 02/08/2018 10:07 AM         Objective:     Visit Vitals  /70   Pulse 100   Temp 98.5 °F (36.9 °C) (Oral)   Resp 18   Ht 5' 8\" (1.727 m)   Wt 308 lb 6.4 oz (139.9 kg)   SpO2 96%   BMI 46.89 kg/m²     The patient appears well, alert, oriented x 3, in no distress. ENT normal.  Neck supple.  No adenopathy or thyromegaly. RON. Lungs are clear, good air entry, no wheezes, rhonchi or rales. S1 and S2 normal, no murmurs, regular rate and rhythm. Abdomen is soft without tenderness, guarding, mass or organomegaly. Extremities show no edema, normal peripheral pulses. Neurological is normal without focal findings. Assessment/Plan:   healthy adult male  lose weight, increase physical activity, limit alcohol consumption, follow low fat diet, follow low salt diet, continue present plan, routine labs ordered, call if any problems. ICD-10-CM ICD-9-CM     1. Routine medical exam Z00.00 V70.0 CBC W/O DIFF  annual exam completed today   2. Obesity, morbid ([facility] [address] E66.01 278.01     3. Essential hypertension V84 145.5 METABOLIC PANEL, COMPREHENSIVE  blood pressure controlled, continue same medications  Routine labs ordered      LIPID PANEL    4. Acquired hypothyroidism E03.9 244.9 TSH 3RD GENERATION  check thyroid levels today      T4, FREE    5. Vitamin D deficiency E55.9 268.9 VITAMIN D, 25 HYDROXY  has had low vitamin D in the past will recheck today   6. Prediabetes R73.03 790.29 HEMOGLOBIN A1C WITH EAG  check A1c to evaluate his prediabetes      LIPID PANEL        An After Visit Summary was printed and given to the patient. All diagnosis have been discussed with the patient and all of the patient's questions have been answered. Follow-up and Dispositions    · Return in about 4 months (around 12/2/2019) for hypertension, 30 minutes. Margy Sapp, [place] [id number] [address]   Frankie Pickering

## 2019-08-02 NOTE — PATIENT INSTRUCTIONS
Learning About Low-Carbohydrate Diets for Weight Loss  What is a low-carbohydrate diet? Low-carb diets avoid foods that are high in carbohydrate. These high-carb foods include pasta, bread, rice, cereal, fruits, and starchy vegetables. Instead, these diets usually have you eat foods that are high in fat and protein. Many people lose weight quickly on a low-carb diet. But the early weight loss is water. People on this diet often gain the weight back after they start eating carbs again. Not all diet plans are safe or work well. A lot of the evidence shows that low-carb diets aren't healthy. That's because these diets often don't include healthy foods like fruits and vegetables. Losing weight safely means balancing protein, fat, and carbs with every meal and snack. And low-carb diets don't always provide the vitamins, minerals, and fiber you need. If you have a serious medical condition, talk to your doctor before you try any diet. These conditions include kidney disease, heart disease, type 2 diabetes, high cholesterol, and high blood pressure. If you are pregnant, it may not be safe for your baby if you are on a low-carb diet. How can you lose weight safely? You might have heard that a diet plan helped another person lose weight. But that doesn't mean that it will work for you. It is very hard to stay on a diet that includes lots of big changes in your eating habits. If you want to get to a healthy weight and stay there, making healthy lifestyle changes will often work better than dieting. These steps can help. · Make a plan for change. Work with your doctor to create a plan that is right for you. · See a dietitian. He or she can show you how to make healthy changes in your eating habits. · Manage stress. If you have a lot of stress in your life, it can be hard to focus on making healthy changes to your daily habits. · Track your food and activity.  You are likely to do better at losing weight if you keep track of what you eat and what you do. Follow-up care is a key part of your treatment and safety. Be sure to make and go to all appointments, and call your doctor if you are having problems. It's also a good idea to know your test results and keep a list of the medicines you take. Where can you learn more? Go to http://tony-bertram.info/. Enter A121 in the search box to learn more about \"Learning About Low-Carbohydrate Diets for Weight Loss. \"  Current as of: March 28, 2018  Content Version: 11.9  © 8386-8425 [employer]. Care instructions adapted under license by [employer] (which disclaims liability or warranty for this information). If you have questions about a medical condition or this instruction, always ask your healthcare professional. [address] any warranty or liability for your use of this information. Body Mass Index: Care Instructions  Your Care Instructions    Body mass index (BMI) can help you see if your weight is raising your risk for health problems. It uses a formula to compare how much you weigh with how tall you are. · A BMI lower than 18.5 is considered underweight. · A BMI between 18.5 and 24.9 is considered healthy. · A BMI between 25 and 29.9 is considered overweight. A BMI of 30 or higher is considered obese. If your BMI is in the normal range, it means that you have a lower risk for weight-related health problems. If your BMI is in the overweight or obese range, you may be at increased risk for weight-related health problems, such as high blood pressure, heart disease, stroke, arthritis or joint pain, and diabetes. If your BMI is in the underweight range, you may be at increased risk for health problems such as fatigue, lower protection (immunity) against illness, muscle loss, bone loss, hair loss, and hormone problems. BMI is just one measure of your risk for weight-related health problems.  You may be at higher risk for health problems if you are not active, you eat an unhealthy diet, or you drink too much alcohol or use tobacco products. Follow-up care is a key part of your treatment and safety. Be sure to make and go to all appointments, and call your doctor if you are having problems. It's also a good idea to know your test results and keep a list of the medicines you take. How can you care for yourself at home? · Practice healthy eating habits. This includes eating plenty of fruits, vegetables, whole grains, lean protein, and low-fat dairy. · If your doctor recommends it, get more exercise. Walking is a good choice. Bit by bit, increase the amount you walk every day. Try for at least 30 minutes on most days of the week. · Do not smoke. Smoking can increase your risk for health problems. If you need help quitting, talk to your doctor about stop-smoking programs and medicines. These can increase your chances of quitting for good. · Limit alcohol to 2 drinks a day for men and 1 drink a day for women. Too much alcohol can cause health problems. If you have a BMI higher than 25  · Your doctor may do other tests to check your risk for weight-related health problems. This may include measuring the distance around your waist. A waist measurement of more than 40 inches in men or 35 inches in women can increase the risk of weight-related health problems. · Talk with your doctor about steps you can take to stay healthy or improve your health. You may need to make lifestyle changes to lose weight and stay healthy, such as changing your diet and getting regular exercise. If you have a BMI lower than 18.5  · Your doctor may do other tests to check your risk for health problems. · Talk with your doctor about steps you can take to stay healthy or improve your health.  You may need to make lifestyle changes to gain or maintain weight and stay healthy, such as getting more healthy foods in your diet and doing exercises to build muscle. Where can you learn more? Go to http://tony-bertram.info/. Enter S176 in the search box to learn more about \"Body Mass Index: Care Instructions. \"  Current as of: March 28, 2019  Content Version: 12.1  © 0920-4920 [employer]. Care instructions adapted under license by [employer] (which disclaims liability or warranty for this information). If you have questions about a medical condition or this instruction, always ask your healthcare professional. [address] any warranty or liability for your use of this information.

## 2019-08-02 NOTE — PROGRESS NOTES
1. Have you been to the ER, urgent care clinic since your last visit? Hospitalized since your last visit? No.     2. Have you seen or consulted any other health care providers outside of the [address] [place] [address] since your last visit? Include any pap smears or colon screening.  No.     Chief Complaint   Patient presents with    Complete Physical

## 2019-08-23 ENCOUNTER — HOSPITAL ENCOUNTER (OUTPATIENT)
Dept: LAB | Age: 34
Discharge: HOME | End: 2019-08-23
Payer: COMMERCIAL

## 2019-08-23 DIAGNOSIS — R73.03 PREDIABETES: ICD-10-CM

## 2019-08-23 DIAGNOSIS — E03.9 ACQUIRED HYPOTHYROIDISM: ICD-10-CM

## 2019-08-23 DIAGNOSIS — E55.9 VITAMIN D DEFICIENCY: ICD-10-CM

## 2019-08-23 DIAGNOSIS — Z00.00 ROUTINE MEDICAL EXAM: ICD-10-CM

## 2019-08-23 DIAGNOSIS — I10 ESSENTIAL HYPERTENSION: ICD-10-CM

## 2019-08-23 LAB
ALBUMIN SERPL-MCNC: 4 G/DL (ref 3.4–5)
ALBUMIN/GLOB SERPL: 1.1 {RATIO} (ref 0.8–1.7)
ALP SERPL-CCNC: 83 U/L (ref 45–117)
ALT SERPL-CCNC: 30 U/L (ref 16–61)
ANION GAP SERPL CALC-SCNC: 8 MMOL/L (ref 3–18)
AST SERPL-CCNC: 13 U/L (ref 10–38)
BILIRUB SERPL-MCNC: 0.8 MG/DL (ref 0.2–1)
BUN SERPL-MCNC: 17 MG/DL (ref 7–18)
BUN/CREAT SERPL: 15 (ref 12–20)
CALCIUM SERPL-MCNC: 9 MG/DL (ref 8.5–10.1)
CHLORIDE SERPL-SCNC: 102 MMOL/L (ref 100–111)
CO2 SERPL-SCNC: 25 MMOL/L (ref 21–32)
CREAT SERPL-MCNC: 1.13 MG/DL (ref 0.6–1.3)
ERYTHROCYTE [DISTWIDTH] IN BLOOD BY AUTOMATED COUNT: 14.1 % (ref 11.6–14.5)
EST. AVERAGE GLUCOSE BLD GHB EST-MCNC: 128 MG/DL
GLOBULIN SER CALC-MCNC: 3.5 G/DL (ref 2–4)
GLUCOSE SERPL-MCNC: 95 MG/DL (ref 74–99)
HBA1C MFR BLD: 6.1 % (ref 4.2–5.6)
HCT VFR BLD AUTO: 46.3 % (ref 36–48)
HGB BLD-MCNC: 14.9 G/DL (ref 13–16)
MCH RBC QN AUTO: 28.2 PG (ref 24–34)
MCHC RBC AUTO-ENTMCNC: 32.2 G/DL (ref 31–37)
MCV RBC AUTO: 87.7 FL (ref 74–97)
PLATELET # BLD AUTO: 378 K/UL (ref 135–420)
PMV BLD AUTO: 9.5 FL (ref 9.2–11.8)
POTASSIUM SERPL-SCNC: 4.4 MMOL/L (ref 3.5–5.5)
PROT SERPL-MCNC: 7.5 G/DL (ref 6.4–8.2)
RBC # BLD AUTO: 5.28 M/UL (ref 4.7–5.5)
SODIUM SERPL-SCNC: 135 MMOL/L (ref 136–145)
TSH SERPL DL<=0.05 MIU/L-ACNC: 1.26 UIU/ML (ref 0.36–3.74)
WBC # BLD AUTO: 10.1 K/UL (ref 4.6–13.2)

## 2019-08-23 PROCEDURE — 84439 ASSAY OF FREE THYROXINE: CPT

## 2019-08-23 PROCEDURE — 82306 VITAMIN D 25 HYDROXY: CPT

## 2019-08-23 PROCEDURE — 36415 COLL VENOUS BLD VENIPUNCTURE: CPT

## 2019-08-23 PROCEDURE — 83036 HEMOGLOBIN GLYCOSYLATED A1C: CPT

## 2019-08-23 PROCEDURE — 85027 COMPLETE CBC AUTOMATED: CPT

## 2019-08-23 PROCEDURE — 80053 COMPREHEN METABOLIC PANEL: CPT

## 2019-08-23 PROCEDURE — 84443 ASSAY THYROID STIM HORMONE: CPT

## 2019-08-23 PROCEDURE — 80061 LIPID PANEL: CPT

## 2019-08-24 LAB
25(OH)D3 SERPL-MCNC: 29.6 NG/ML (ref 30–100)
CHOLEST SERPL-MCNC: 173 MG/DL
HDLC SERPL-MCNC: 38 MG/DL (ref 40–60)
HDLC SERPL: 4.6 {RATIO} (ref 0–5)
LDLC SERPL CALC-MCNC: 115.6 MG/DL (ref 0–100)
LIPID PROFILE,FLP: ABNORMAL
T4 FREE SERPL-MCNC: 1.2 NG/DL (ref 0.7–1.5)
TRIGL SERPL-MCNC: 97 MG/DL (ref ?–150)
VLDLC SERPL CALC-MCNC: 19.4 MG/DL

## 2019-08-26 RX ORDER — ASPIRIN 325 MG
50000 TABLET, DELAYED RELEASE (ENTERIC COATED) ORAL
Qty: 12 CAP | Refills: 0 | Status: SHIPPED | OUTPATIENT
Start: 2019-08-26 | End: 2019-11-12

## 2019-08-26 NOTE — PROGRESS NOTES
Please let him know his vitamin D is on the low side. We will send a prescription for vitamin D3 50,000 units 1 capsule by mouth once a week for the next 12 weeks.   Otherwise the rest of his labs are in the normal range

## 2019-10-30 DIAGNOSIS — E03.9 ACQUIRED HYPOTHYROIDISM: ICD-10-CM

## 2019-10-31 RX ORDER — LEVOTHYROXINE SODIUM 25 UG/1
TABLET ORAL
Qty: 90 TAB | Refills: 0 | Status: SHIPPED | OUTPATIENT
Start: 2019-10-31 | End: 2020-01-27

## 2019-12-13 ENCOUNTER — OFFICE VISIT (OUTPATIENT)
Dept: FAMILY MEDICINE CLINIC | Age: 34
End: 2019-12-13

## 2019-12-13 VITALS
SYSTOLIC BLOOD PRESSURE: 127 MMHG | DIASTOLIC BLOOD PRESSURE: 77 MMHG | OXYGEN SATURATION: 97 % | RESPIRATION RATE: 16 BRPM | TEMPERATURE: 99.1 F | HEART RATE: 92 BPM | BODY MASS INDEX: 46.38 KG/M2 | WEIGHT: 306 LBS | HEIGHT: 68 IN

## 2019-12-13 DIAGNOSIS — E03.9 ACQUIRED HYPOTHYROIDISM: ICD-10-CM

## 2019-12-13 DIAGNOSIS — M72.2 PLANTAR FASCIITIS, BILATERAL: ICD-10-CM

## 2019-12-13 DIAGNOSIS — E55.9 VITAMIN D DEFICIENCY: ICD-10-CM

## 2019-12-13 DIAGNOSIS — M79.672 BILATERAL FOOT PAIN: ICD-10-CM

## 2019-12-13 DIAGNOSIS — I10 ESSENTIAL HYPERTENSION: Primary | ICD-10-CM

## 2019-12-13 DIAGNOSIS — M79.671 BILATERAL FOOT PAIN: ICD-10-CM

## 2019-12-13 NOTE — PATIENT INSTRUCTIONS
Plantar Fasciitis: Exercises Introduction Here are some examples of exercises for you to try. The exercises may be suggested for a condition or for rehabilitation. Start each exercise slowly. Ease off the exercises if you start to have pain. You will be told when to start these exercises and which ones will work best for you. How to do the exercises Towel stretch 1. Sit with your legs extended and knees straight. 2. Place a towel around your foot just under the toes. 3. Hold each end of the towel in each hand, with your hands above your knees. 4. Pull back with the towel so that your foot stretches toward you. 5. Hold the position for at least 15 to 30 seconds. 6. Repeat 2 to 4 times a session, up to 5 sessions a day. Calf stretch 1. Stand facing a wall with your hands on the wall at about eye level. Put the leg you want to stretch about a step behind your other leg. 2. Keeping your back heel on the floor, bend your front knee until you feel a stretch in the back leg. 3. Hold the stretch for 15 to 30 seconds. Repeat 2 to 4 times. Plantar fascia and calf stretch 1. Stand on a step as shown above. Be sure to hold on to the banister. 2. Slowly let your heels down over the edge of the step as you relax your calf muscles. You should feel a gentle stretch across the bottom of your foot and up the back of your leg to your knee. 3. Hold the stretch about 15 to 30 seconds, and then tighten your calf muscle a little to bring your heel back up to the level of the step. Repeat 2 to 4 times. Towel curls 1. While sitting, place your foot on a towel on the floor and scrunch the towel toward you with your toes. 2. Then, also using your toes, push the towel away from you. [place] pickups 1. Put marbles on the floor next to a cup. 
2. Using your toes, try to lift the marbles up from the floor and put them in the cup. Follow-up care is a key part of your treatment and safety. Be sure to make and go to all appointments, and call your doctor if you are having problems. It's also a good idea to know your test results and keep a list of the medicines you take. Where can you learn more? Go to http://tony-bertram.info/. Rk Moon in the search box to learn more about \"Plantar Fasciitis: Exercises. \" Current as of: June 26, 2019 Content Version: 12.2 © 4440-8274 [employer]. Care instructions adapted under license by [employer] (which disclaims liability or warranty for this information). If you have questions about a medical condition or this instruction, always ask your healthcare professional. [address] any warranty or liability for your use of this information.

## 2019-12-13 NOTE — PROGRESS NOTES
Deon               [facility] [address]               [phone]      Alyson Mariscal is a 29 y.o. male and presents with Follow-up and Hypertension       Assessment/Plan:    Diagnoses and all orders for this visit:    1. Essential hypertension  -     METABOLIC PANEL, COMPREHENSIVE; Future  Blood pressure stable, continue same medications    2. Acquired hypothyroidism  -     TSH 3RD GENERATION; Future  -     T4, FREE; Future  Follow-up thyroid levels today    3. Vitamin D deficiency  Encouraged him to continue to take over-the-counter vitamin D3 daily    4. Bilateral foot pain  Foot pain is most likely due to plantar fasciitis, he is already gotten new shoes and is using supportive orthotics  Handouts on exercises given  Recommend he roll his footOn a frozen ice water bottle for decreasing inflammation at least once a day  Reviewed with him wrapping his foot with tape or wearing a supportive foot brace    5. Plantar fasciitis, bilateral      This note was dictated using Dragon dictation system. Every effort was made to ensure accuracy, although occasional spelling errors and word substitutions are expected due to dictation system limitations. Thank you for your understanding and patience. Follow-up and Dispositions    · Return in about 4 months (around 4/13/2020) for hypertension, hyperlipidemia, hypothyroid, 15 minutes. Subjective:    Hypertension:   Patient reports taking medications as instructed. yes   Medication side effects noted. no  Headache upon wakening. no   Home BP monitoring in range of 478/46'M systolic.       Hypothyroid (positives in RED)  Fatigue and weakness  Cold intolerance  Dyspnea on exertion  Weight gain  Cognitive dysfunction  Constipation   Dry skin  Hoarseness  Edema     Bilat Foot Pain  Started with new job in October  New job requires him to walk all day long  Changed shoes and got insoles  Now feet only hurt at end of workday  Pain after sitting and then getting up, the pain will go away with walking  Pain in left heel and right foot arch  Has not taken any medications  Has done some stretching exercises      ROS:As stated in HPI, otherwise all others negative. ROS    The problem list was updated as a part of today's visit. Patient Active Problem List   Diagnosis Code    Obesity, morbid ([facility] [address] E66.01    Hypertension I10    Acquired hypothyroidism E03.9    Vitamin D deficiency E55.9       The PSH, FH were reviewed. SH:  Social History     Tobacco Use    Smoking status: Never Smoker    Smokeless tobacco: Never Used   Substance Use Topics    Alcohol use: Yes     Comment: Occaisionally    Drug use: No       Medications/Allergies:  Current Outpatient Medications on File Prior to Visit   Medication Sig Dispense Refill    levothyroxine (SYNTHROID) 25 mcg tablet TAKE 1 TABLET BY MOUTH DAILY BEFORE BREAKFAST 90 Tab 0    cholecalciferol (VITAMIN D3) 1,000 unit cap Take  by mouth daily. No current facility-administered medications on file prior to visit. No Known Allergies    Objective:  Visit Vitals  /77   Pulse 92   Temp 99.1 °F (37.3 °C) (Oral)   Resp 16   Ht 5' 8\" (1.727 m)   Wt 306 lb (138.8 kg)   SpO2 97%   BMI 46.53 kg/m²    Body mass index is 46.53 kg/m². Physical assessment  Physical Exam  Vitals signs and nursing note reviewed. Eyes:      Conjunctiva/sclera: Conjunctivae normal.      Pupils: Pupils are equal, round, and reactive to light. Neck:      Musculoskeletal: Normal range of motion. Vascular: No JVD. Cardiovascular:      Rate and Rhythm: Normal rate and regular rhythm. Heart sounds: Normal heart sounds. No murmur. No friction rub. No gallop. Pulmonary:      Effort: Pulmonary effort is normal.      Breath sounds: Normal breath sounds. Musculoskeletal: Normal range of motion. Right foot: Normal range of motion. No deformity or foot drop. Left foot: Normal range of motion.  No deformity or foot drop. Feet:      Right foot:      Skin integrity: Skin integrity normal.      Left foot:      Skin integrity: Skin integrity normal.   Skin:     General: Skin is warm and dry. Neurological:      Mental Status: He is alert and oriented to person, place, and time. Psychiatric:         Mood and Affect: Affect normal.         Cognition and Memory: Memory normal.         Judgment: Judgment normal.           Labwork and Ancillary Studies:    CBC w/Diff  Lab Results   Component Value Date/Time    WBC 10.1 08/23/2019 09:16 AM    HGB 14.9 08/23/2019 09:16 AM    PLATELET 050 08/22/7914 09:16 AM         Basic Metabolic Profile  Lab Results   Component Value Date/Time    Sodium 135 (L) 08/23/2019 09:16 AM    Potassium 4.4 08/23/2019 09:16 AM    Chloride 102 08/23/2019 09:16 AM    CO2 25 08/23/2019 09:16 AM    Anion gap 8 08/23/2019 09:16 AM    Glucose 95 08/23/2019 09:16 AM    BUN 17 08/23/2019 09:16 AM    Creatinine 1.13 08/23/2019 09:16 AM    BUN/Creatinine ratio 15 08/23/2019 09:16 AM    GFR est AA >60 08/23/2019 09:16 AM    GFR est non-AA >60 08/23/2019 09:16 AM    Calcium 9.0 08/23/2019 09:16 AM        Cholesterol  Lab Results   Component Value Date/Time    Cholesterol, total 173 08/23/2019 09:16 AM    HDL Cholesterol 38 (L) 08/23/2019 09:16 AM    LDL, calculated 115.6 (H) 08/23/2019 09:16 AM    Triglyceride 97 08/23/2019 09:16 AM    CHOL/HDL Ratio 4.6 08/23/2019 09:16 AM       Health Maintenance:   Health Maintenance   Topic Date Due    DTaP/Tdap/Td series (1 - Tdap) 12/29/1996    Influenza Age 5 to Adult  09/01/2020 (Originally 8/1/2019)    Pneumococcal 0-64 years  Aged Out       I have discussed the diagnosis with the patient and the intended plan as seen in the above orders. The patient has received an After-Visit Summary and questions were answered concerning future plans. An After Visit Summary was printed and given to the patient.     All diagnosis have been discussed with the patient and all of the patient's questions have been answered. Follow-up and Dispositions    · Return in about 4 months (around 4/13/2020) for hypertension, hyperlipidemia, hypothyroid, 15 minutes. Shayna Sauceda, [facility]  [address] [facility] [place]   [address] [facility] [address]  [address]

## 2019-12-13 NOTE — PROGRESS NOTES
Chief Complaint   Patient presents with    Follow-up    Hypertension       1. Have you been to the ER, urgent care clinic since your last visit? Hospitalized since your last visit? NO    2. Have you seen or consulted any other health care providers outside of the [address] [place] [address] since your last visit? Include any pap smears or colon screening.  NO

## 2019-12-29 DIAGNOSIS — I10 ESSENTIAL HYPERTENSION: ICD-10-CM

## 2019-12-29 RX ORDER — LISINOPRIL AND HYDROCHLOROTHIAZIDE 20; 25 MG/1; MG/1
TABLET ORAL
Qty: 30 TAB | Refills: 3 | Status: SHIPPED | OUTPATIENT
Start: 2019-12-29 | End: 2020-04-27

## 2019-12-29 RX ORDER — AMLODIPINE BESYLATE 10 MG/1
TABLET ORAL
Qty: 30 TAB | Refills: 3 | Status: SHIPPED | OUTPATIENT
Start: 2019-12-29 | End: 2020-04-27

## 2020-01-27 DIAGNOSIS — E03.9 ACQUIRED HYPOTHYROIDISM: ICD-10-CM

## 2020-01-27 RX ORDER — LEVOTHYROXINE SODIUM 25 UG/1
TABLET ORAL
Qty: 90 TAB | Refills: 0 | Status: SHIPPED | OUTPATIENT
Start: 2020-01-27 | End: 2020-04-27

## 2020-12-08 DIAGNOSIS — I10 ESSENTIAL HYPERTENSION: ICD-10-CM

## 2020-12-08 DIAGNOSIS — E03.9 ACQUIRED HYPOTHYROIDISM: ICD-10-CM

## 2020-12-08 RX ORDER — LISINOPRIL AND HYDROCHLOROTHIAZIDE 20; 25 MG/1; MG/1
TABLET ORAL
Qty: 30 TAB | Refills: 0 | OUTPATIENT
Start: 2020-12-08

## 2020-12-08 RX ORDER — AMLODIPINE BESYLATE 10 MG/1
TABLET ORAL
Qty: 30 TAB | Refills: 0 | OUTPATIENT
Start: 2020-12-08

## 2020-12-10 RX ORDER — AMLODIPINE BESYLATE 10 MG/1
TABLET ORAL
Qty: 30 TAB | Refills: 0 | Status: SHIPPED | OUTPATIENT
Start: 2020-12-10 | End: 2020-12-18 | Stop reason: SDUPTHER

## 2020-12-10 RX ORDER — LISINOPRIL AND HYDROCHLOROTHIAZIDE 20; 25 MG/1; MG/1
TABLET ORAL
Qty: 30 TAB | Refills: 0 | Status: SHIPPED | OUTPATIENT
Start: 2020-12-10 | End: 2020-12-18 | Stop reason: SDUPTHER

## 2020-12-10 RX ORDER — LEVOTHYROXINE SODIUM 25 UG/1
TABLET ORAL
Qty: 30 TAB | Refills: 0 | Status: SHIPPED | OUTPATIENT
Start: 2020-12-10 | End: 2020-12-18 | Stop reason: SDUPTHER

## 2020-12-18 PROBLEM — R73.03 PRE-DIABETES: Status: ACTIVE | Noted: 2020-12-18

## 2020-12-18 PROBLEM — E78.00 ELEVATED LDL CHOLESTEROL LEVEL: Status: ACTIVE | Noted: 2020-12-18

## 2021-01-06 LAB — SARS-COV-2, NAA: POSITIVE

## 2021-01-20 LAB — SARS-COV-2, NAA: NOT DETECTED

## 2021-01-28 DIAGNOSIS — E55.9 VITAMIN D DEFICIENCY: Primary | ICD-10-CM

## 2021-01-29 LAB
25(OH)D3 SERPL-MCNC: 20 NG/ML (ref 30–100)
ALB/GLOBRATIO, 58C: 1.6 (CALC) (ref 1–2.5)
ALBUMIN SERPL-MCNC: 4.7 G/DL (ref 3.6–5.1)
ALKALINE PHOSPHATASE, TOTAL, 25002000: 80 U/L (ref 36–130)
ALT SERPL-CCNC: 21 U/L (ref 9–46)
AST SERPL W P-5'-P-CCNC: 14 U/L (ref 10–40)
BILIRUB SERPL-MCNC: 0.7 MG/DL (ref 0.2–1.2)
BUN SERPL-MCNC: 20 MG/DL (ref 7–25)
BUN/CREATININE RATIO,BUCR: NORMAL (CALC) (ref 6–22)
CALCIUM SERPL-MCNC: 9.7 MG/DL (ref 8.6–10.3)
CHLORIDE SERPL-SCNC: 102 MMOL/L (ref 98–110)
CHOL/HDL RATIO,CHHDX: 5.1 (CALC)
CHOLEST SERPL-MCNC: 202 MG/DL
CO2 SERPL-SCNC: 25 MMOL/L (ref 20–32)
CREAT SERPL-MCNC: 1.11 MG/DL (ref 0.6–1.35)
EAG (MG/DL),9916804: 120 (CALC)
EAG (MMOL/L),9916805: 6.6 (CALC)
GLOBULIN,GLOB: 3 G/DL (CALC) (ref 1.9–3.7)
GLUCOSE SERPL-MCNC: 84 MG/DL (ref 65–99)
HBA1C MFR BLD HPLC: 5.8 % OF TOTAL HGB
HDLC SERPL-MCNC: 40 MG/DL
LDL-CHOLESTEROL: 129 MG/DL (CALC)
NON-HDL CHOLESTEROL, 011976: 162 MG/DL (CALC)
POTASSIUM SERPL-SCNC: 4.2 MMOL/L (ref 3.5–5.3)
PROT SERPL-MCNC: 7.7 G/DL (ref 6.1–8.1)
SODIUM SERPL-SCNC: 137 MMOL/L (ref 135–146)
TRIGL SERPL-MCNC: 193 MG/DL (ref ?–150)
TSH SERPL DL<=0.005 MIU/L-ACNC: 1.85 MIU/L (ref 0.4–4.5)

## 2021-02-10 RX ORDER — ASPIRIN 325 MG
50000 TABLET, DELAYED RELEASE (ENTERIC COATED) ORAL
Qty: 12 CAP | Refills: 1 | Status: SHIPPED | OUTPATIENT
Start: 2021-02-10 | End: 2021-04-21

## 2021-02-10 NOTE — PROGRESS NOTES
Cholesterol is higher, work on eating a low fat heart healthy diet and exercising.   Vitamin D is low, Rx for D3 50,000 units once a week has been sent to his pharmacy, continue for 6 months

## 2021-04-21 ENCOUNTER — VIRTUAL VISIT (OUTPATIENT)
Dept: FAMILY MEDICINE CLINIC | Age: 36
End: 2021-04-21
Payer: COMMERCIAL

## 2021-04-21 DIAGNOSIS — E55.9 VITAMIN D DEFICIENCY: ICD-10-CM

## 2021-04-21 DIAGNOSIS — E78.00 ELEVATED LDL CHOLESTEROL LEVEL: ICD-10-CM

## 2021-04-21 DIAGNOSIS — I10 ESSENTIAL HYPERTENSION: Primary | ICD-10-CM

## 2021-04-21 DIAGNOSIS — E03.9 ACQUIRED HYPOTHYROIDISM: ICD-10-CM

## 2021-04-21 PROCEDURE — 99214 OFFICE O/P EST MOD 30 MIN: CPT | Performed by: NURSE PRACTITIONER

## 2021-04-21 RX ORDER — LEVOTHYROXINE SODIUM 25 UG/1
TABLET ORAL
Qty: 90 TAB | Refills: 1 | Status: SHIPPED | OUTPATIENT
Start: 2021-04-21 | End: 2021-10-24

## 2021-04-21 RX ORDER — AMLODIPINE BESYLATE 10 MG/1
TABLET ORAL
Qty: 90 TAB | Refills: 1 | Status: SHIPPED | OUTPATIENT
Start: 2021-04-21 | End: 2021-10-24

## 2021-04-21 RX ORDER — LISINOPRIL AND HYDROCHLOROTHIAZIDE 20; 25 MG/1; MG/1
TABLET ORAL
Qty: 90 TAB | Refills: 1 | Status: SHIPPED | OUTPATIENT
Start: 2021-04-21 | End: 2021-10-24

## 2021-04-21 NOTE — PROGRESS NOTES
For virtual visit patient would like to receive link via TEXT: [phone]    Jessica Garcia is a 28 y.o. male (: 1985) evaluated via telephone on 2021 to address:    Chief Complaint   Patient presents with    Hypertension    Cholesterol Problem    Thyroid Problem     patient states he has been out of thyroid med x 2 months       There were no vitals filed for this visit. Allergies Reviewed. Learning Assessment:     Learning Assessment 2018   PRIMARY LEARNER Patient   HIGHEST LEVEL OF EDUCATION - PRIMARY LEARNER  SOME COLLEGE   BARRIERS PRIMARY LEARNER NONE   CO-LEARNER CAREGIVER No   PRIMARY LANGUAGE ENGLISH   LEARNER PREFERENCE PRIMARY READING   ANSWERED BY Kleber   RELATIONSHIP SELF     Depression Screening:     3 most recent PHQ Screens 2021   Little interest or pleasure in doing things Not at all   Feeling down, depressed, irritable, or hopeless Not at all   Total Score PHQ 2 0     Fall Risk Assessment:     Fall Risk Assessment, last 12 mths 2020   Able to walk? Yes   Fall in past 12 months? No     Abuse Screening:     Abuse Screening Questionnaire 2020   Do you ever feel afraid of your partner? N   Are you in a relationship with someone who physically or mentally threatens you? N   Is it safe for you to go home? Y     ADL Assessment:   No flowsheet data found. Coordination of Care Questionaire:   1. Have you been to the ER, urgent care clinic since your last visit? Hospitalized since your last visit? YES Patient [address]    2. Have you seen or consulted any other health care providers outside of the [address] [place] [address] since your last visit? Include any pap smears or colon screening. NO    Advanced Directive:   1. Do you have an Advanced Directive? NO    2. Would you like information on Advanced Directives?  NO

## 2021-04-21 NOTE — PROGRESS NOTES
Deon               Frankie Pickering               [phone]      Misty Chapa is a 28 y.o. male who was seen by synchronous (real-time) audio-video technology on 4/21/2021. Consent: Misty Chapa, who was seen by synchronous (real-time) audio-video technology, and/or his healthcare decision maker, is aware that this patient-initiated, Telehealth encounter on 4/21/2021 is a billable service, with coverage as determined by his insurance carrier. He is aware that he may receive a bill and has provided verbal consent to proceed: Yes. Assessment & Plan:   Diagnoses and all orders for this visit:    1. Essential hypertension  -     lisinopril-hydroCHLOROthiazide (PRINZIDE, ZESTORETIC) 20-25 mg per tablet; TAKE 1 TABLET BY MOUTH DAILY  -     amLODIPine (NORVASC) 10 mg tablet; TAKE 1 TABLET BY MOUTH DAILY  Endorses medication compliance, Refill provided, Blood pressure stable per home checks, continue same medications and labs with next visit  2. Vitamin D deficiency  Endorses medication compliance and finished his replacement therapy and is taking daily replacement  3. Acquired hypothyroidism  -     levothyroxine (SYNTHROID) 25 mcg tablet; TAKE 1 TABLET BY MOUTH DAILY BEFORE BREAKFAST  Refill provided, Denies signs and symptoms of hyper or hypothyroidism and ran out of his medicaitons two months ago and did not follow up for refills, recheck levels at next visit  4. Elevated LDL cholesterol level  currently not on medications, will continue to monitor  Hand out on Mediterranean diet and recipes in AVS  Follow-up and Dispositions    · Return in about 3 months (around 7/21/2021) for HTN, HLD, hypothyroid, office only, 15 min.              712  Subjective:     Health Maintenance Due   Topic Date Due    Hepatitis C Screening  Never done    COVID-19 Vaccine (1) Never done    DTaP/Tdap/Td series (1 - Tdap) Never done             Misty Chapa is a 28 y.o. male who was seen for Hypertension, Cholesterol Problem, and Thyroid Problem (patient states he has been out of thyroid med x 2 months)    Hypertension:   Patient reports taking medications as instructed. yes   Medication side effects noted. no  Headache upon wakening. no   Home BP monitoring in range of 706/63'D systolic. Do you experience chest pain/pressure or SOB with exertion? no  Maintain a low salt diet? yes  Key CAD CHF Meds             lisinopril-hydroCHLOROthiazide (PRINZIDE, ZESTORETIC) 20-25 mg per tablet (Taking) TAKE 1 TABLET BY MOUTH DAILY    amLODIPine (NORVASC) 10 mg tablet (Taking) TAKE 1 TABLET BY MOUTH DAILY        HLD:  Has been compliant with meds  not currently on medications  Compliant with low-fat diet. most of the time    Denies myalgias or other side effects. N/A  Cholesterol, total   Date Value Ref Range Status   01/28/2021 202 (H) <200 mg/dL Final     Triglyceride   Date Value Ref Range Status   01/28/2021 193 (H) <150 mg/dL Final     HDL Cholesterol   Date Value Ref Range Status   01/28/2021 40 > OR = 40 mg/dL Final     LDL, calculated   Date Value Ref Range Status   08/23/2019 115.6 (H) 0 - 100 MG/DL Final   ]  Key Antihyperlipidemia Meds     The patient is on no antihyperlipidemia meds. Thyroid Review:  Patient is seen for followup of hypothyroidism. Thyroid ROS: denies fatigue, weight changes, heat/cold intolerance, constipation, diarrhea, fatigue, weakness, edema, paresthesia, irregular menses, hair loss, vocal changes, CVS symptoms. Taking medication daily on empty stomach? Yes, has been out for two months    Prior to Admission medications    Medication Sig Start Date End Date Taking?  Authorizing Provider   levothyroxine (SYNTHROID) 25 mcg tablet TAKE 1 TABLET BY MOUTH DAILY BEFORE BREAKFAST 4/21/21  Yes Jamie Bedolla NP   lisinopril-hydroCHLOROthiazide (PRINZIDE, ZESTORETIC) 20-25 mg per tablet TAKE 1 TABLET BY MOUTH DAILY 4/21/21  Yes Jamie Bedolla NP   amLODIPine (NORVASC) 10 mg tablet TAKE 1 TABLET BY MOUTH DAILY 4/21/21  Yes Sonia Mullen NP   cholecalciferol (VITAMIN D3) 1,000 unit cap Take  by mouth daily. Yes Provider, Historical   cholecalciferol (VITAMIN D3) (50,000 UNITS /1250 MCG) capsule Take 1 Cap by mouth every seven (7) days. 2/10/21 4/21/21  Sonia Mullen NP   amLODIPine (NORVASC) 10 mg tablet TAKE 1 TABLET BY MOUTH DAILY 1/18/21 4/21/21  Sonia Mullen NP   lisinopril-hydroCHLOROthiazide (PRINZIDE, ZESTORETIC) 20-25 mg per tablet TAKE 1 TABLET BY MOUTH DAILY 1/18/21 4/21/21  Sonia Mullen NP   levothyroxine (SYNTHROID) 25 mcg tablet TAKE 1 TABLET BY MOUTH DAILY BEFORE BREAKFAST 12/18/20 4/21/21  Sonia Mullen NP     No Known Allergies    Patient Active Problem List   Diagnosis Code    Obesity, morbid ([facility] [address] E66.01    Hypertension I10    Acquired hypothyroidism E03.9    Vitamin D deficiency E55.9    Pre-diabetes R73.03    Elevated LDL cholesterol level E78.00     Past Surgical History:   Procedure Laterality Date    HX ORTHOPAEDIC      Hand Surgery     Family History   Problem Relation Age of Onset    Hypertension Mother 48    Diabetes Father     Hypertension Father 36     Social History     Tobacco Use    Smoking status: Never Smoker    Smokeless tobacco: Never Used   Substance Use Topics    Alcohol use: Yes     Frequency: Monthly or less     Drinks per session: 1 or 2     Comment: Occaisionally       ROS  As stated in HPI, otherwise all others negative. Objective: There were no vitals taken for this visit.    General: alert, cooperative, no distress   Mental  status: normal mood, behavior, speech, dress, motor activity, and thought processes, able to follow commands   HENT: NCAT   Neck: no visualized mass   Resp: no respiratory distress   Neuro: no gross deficits   Skin: no discoloration or lesions of concern on visible areas   Psychiatric: normal affect, consistent with stated mood, no evidence of hallucinations     Additional exam findings: We discussed the expected course, resolution and complications of the diagnosis(es) in detail. Medication risks, benefits, costs, interactions, and alternatives were discussed as indicated. I advised him to contact the office if his condition worsens, changes or fails to improve as anticipated. He expressed understanding with the diagnosis(es) and plan. Lio Moore is a 28 y.o. male who was evaluated by a video visit encounter for concerns as above. Patient identification was verified prior to start of the visit. A caregiver was present when appropriate. Due to this being a TeleHealth encounter (During QOAOI-12 public health emergency), evaluation of the following organ systems was limited: Vitals/Constitutional/EENT/Resp/CV/GI//MS/Neuro/Skin/Heme-Lymph-Imm. Pursuant to the emergency declaration under the [facility] waiver authority and the [employer] Act, this Virtual  Visit was conducted, with patient's (and/or legal guardian's) consent, to reduce the patient's risk of exposure to COVID-19 and provide necessary medical care. Services were provided through a video synchronous discussion virtually to substitute for in-person clinic visit. Patient and provider were located at their individual homes. An After Visit Summary was printed and given to the patient. All diagnosis have been discussed with the patient and all of the patient's questions have been answered. Follow-up and Dispositions    · Return in about 3 months (around 7/21/2021) for HTN, HLD, hypothyroid, office only, 15 min. Gabriela Vital, ROBERT[facility]-[address].   Frankie Pickering

## 2021-04-21 NOTE — PATIENT INSTRUCTIONS
DASH Diet: Care Instructions Your Care Instructions The DASH diet is an eating plan that can help lower your blood pressure. DASH stands for Dietary Approaches to Stop Hypertension. Hypertension is high blood pressure. The DASH diet focuses on eating foods that are high in calcium, potassium, and magnesium. These nutrients can lower blood pressure. The foods that are highest in these nutrients are fruits, vegetables, low-fat dairy products, nuts, seeds, and legumes. But taking calcium, potassium, and magnesium supplements instead of eating foods that are high in those nutrients does not have the same effect. The DASH diet also includes whole grains, fish, and poultry. The DASH diet is one of several lifestyle changes your doctor may recommend to lower your high blood pressure. Your doctor may also want you to decrease the amount of sodium in your diet. Lowering sodium while following the DASH diet can lower blood pressure even further than just the DASH diet alone. Follow-up care is a key part of your treatment and safety. Be sure to make and go to all appointments, and call your doctor if you are having problems. It's also a good idea to know your test results and keep a list of the medicines you take. How can you care for yourself at home? Following the DASH diet · Eat 4 to 5 servings of fruit each day. A serving is 1 medium-sized piece of fruit, ½ cup chopped or canned fruit, 1/4 cup dried fruit, or 4 ounces (½ cup) of fruit juice. Choose fruit more often than fruit juice. · Eat 4 to 5 servings of vegetables each day. A serving is 1 cup of lettuce or raw leafy vegetables, ½ cup of chopped or cooked vegetables, or 4 ounces (½ cup) of vegetable juice. Choose vegetables more often than vegetable juice. · Get 2 to 3 servings of low-fat and fat-free dairy each day. A serving is 8 ounces of milk, 1 cup of yogurt, or 1 ½ ounces of cheese. · Eat 6 to 8 servings of grains each day.  A serving is 1 slice of bread, 1 ounce of dry cereal, or ½ cup of cooked rice, pasta, or cooked cereal. Try to choose whole-grain products as much as possible. · Limit lean meat, poultry, and fish to 2 servings each day. A serving is 3 ounces, about the size of a deck of cards. · Eat 4 to 5 servings of nuts, seeds, and legumes (cooked dried beans, lentils, and split peas) each week. A serving is 1/3 cup of nuts, 2 tablespoons of seeds, or ½ cup of cooked beans or peas. · Limit fats and oils to 2 to 3 servings each day. A serving is 1 teaspoon of vegetable oil or 2 tablespoons of salad dressing. · Limit sweets and added sugars to 5 servings or less a week. A serving is 1 tablespoon jelly or jam, ½ cup sorbet, or 1 cup of lemonade. · Eat less than 2,300 milligrams (mg) of sodium a day. If you limit your sodium to 1,500 mg a day, you can lower your blood pressure even more. Tips for success · Start small. Do not try to make dramatic changes to your diet all at once. You might feel that you are missing out on your favorite foods and then be more likely to not follow the plan. Make small changes, and stick with them. Once those changes become habit, add a few more changes. · Try some of the following: ? Make it a goal to eat a fruit or vegetable at every meal and at snacks. This will make it easy to get the recommended amount of fruits and vegetables each day. ? Try yogurt topped with fruit and nuts for a snack or healthy dessert. ? Add lettuce, tomato, cucumber, and onion to sandwiches. ? Combine a ready-made pizza crust with low-fat mozzarella cheese and lots of vegetable toppings. Try using tomatoes, squash, spinach, broccoli, carrots, cauliflower, and onions. ? Have a variety of cut-up vegetables with a low-fat dip as an appetizer instead of chips and dip. ? Sprinkle sunflower seeds or chopped almonds over salads. Or try adding chopped walnuts or almonds to cooked vegetables.  
? Try some vegetarian meals using beans and peas. Add garbanzo or kidney beans to salads. Make burritos and tacos with mashed browne beans or black beans. Where can you learn more? Go to http://www.gray.com/ Enter D479 in the search box to learn more about \"DASH Diet: Care Instructions. \" Current as of: December 16, 2019               Content Version: 12.6 © 6110-0659 [employer]. Care instructions adapted under license by [employer] (which disclaims liability or warranty for this information). If you have questions about a medical condition or this instruction, always ask your healthcare professional. [place] [address] any warranty or liability for your use of this information. Learning About the [address] Diet What is the Mediterranean diet? The Mediterranean diet is a style of eating rather than a diet plan. It features foods eaten in [place], [place], [place], and other countries along the [facility]. It emphasizes eating foods like fish, fruits, vegetables, beans, high-fiber breads and whole grains, nuts, and olive oil. This style of eating includes limited red meat, cheese, and sweets. Why choose the Mediterranean diet? A Mediterranean-style diet may improve heart health. It contains more fat than other heart-healthy diets. But the fats are mainly from nuts, unsaturated oils (such as fish oils and olive oil), and certain nut or seed oils (such as canola, soybean, or flaxseed oil). These fats may help protect the heart and blood vessels. How can you get started on the Mediterranean diet? Here are some things you can do to switch to a more Mediterranean way of eating. What to eat · Eat a variety of fruits and vegetables each day, such as grapes, blueberries, tomatoes, broccoli, peppers, figs, olives, spinach, eggplant, beans, lentils, and chickpeas.  
· Eat a variety of whole-grain foods each day, such as oats, brown rice, and whole wheat bread, pasta, and couscous. · Eat fish at least 2 times a week. Try tuna, salmon, mackerel, lake trout, herring, or sardines. · Eat moderate amounts of low-fat dairy products, such as milk, cheese, or yogurt. · Eat moderate amounts of poultry and eggs. · Choose healthy (unsaturated) fats, such as nuts, olive oil, and certain nut or seed oils like canola, soybean, and flaxseed. · Limit unhealthy (saturated) fats, such as butter, palm oil, and coconut oil. And limit fats found in animal products, such as meat and dairy products made with whole milk. Try to eat red meat only a few times a month in very small amounts. · Limit sweets and desserts to only a few times a week. This includes sugar-sweetened drinks like soda. The Mediterranean diet may also include red wine with your meal1 glass each day for women and up to 2 glasses a day for men. Tips for eating at home · Use herbs, spices, garlic, lemon zest, and citrus juice instead of salt to add flavor to foods. · Add avocado slices to your sandwich instead of thomas. · Have fish for lunch or dinner instead of red meat. Brush the fish with olive oil, and broil or grill it. · Sprinkle your salad with seeds or nuts instead of cheese. · Cook with olive or canola oil instead of butter or oils that are high in saturated fat. · Switch from 2% milk or whole milk to 1% or fat-free milk. · Dip raw vegetables in a vinaigrette dressing or hummus instead of dips made from mayonnaise or sour cream. 
· Have a piece of fruit for dessert instead of a piece of cake. Try baked apples, or have some dried fruit. Tips for eating out · Try broiled, grilled, baked, or poached fish instead of having it fried or breaded. · Ask your  to have your meals prepared with olive oil instead of butter. · Order dishes made with marinara sauce or sauces made from olive oil. Avoid sauces made from cream or mayonnaise.  
· Choose whole-grain breads, whole wheat pasta and pizza crust, brown rice, beans, and lentils. · Cut back on butter or margarine on bread. Instead, you can dip your bread in a small amount of olive oil. · Ask for a side salad or grilled vegetables instead of french fries or chips. Where can you learn more? Go to http://www.gray.Netcipia/ Enter [phone] in the search box to learn more about \"Learning About the Mediterranean Diet. \" Current as of: August 22, 2019               Content Version: 12.6 © 9055-8001 [employer]. Care instructions adapted under license by [employer] (which disclaims liability or warranty for this information). If you have questions about a medical condition or this instruction, always ask your healthcare professional. [address] any warranty or liability for your use of this information. Mediterranean slow cooker meals Slow Cooker Chicken Cacciatore Slow-cooked bone-in, skinless chicken thighs create the luxe flavor in this savory [facility] chicken dinner that pairs perfectly with pasta for an incredibly easy weeknight or weekend meal.  
Course Main Course CuisScoopshot [facility] Keyword chicken, slow cooker Prep Time 20 minutes Cook Time 4 hours Total Time 4 hours 20 minutes Servings 8 to 10 Calories 205 kcal  
Ingredients  10 bone-in skinless chicken thighs , about 5 ounces each, trimmed  Kosher salt and freshly ground black pepper  Cooking spray or extra virgin olive oil  5 garlic cloves finely chopped or pressed  1/2 large yellow onion chopped  1 28- ounce can crushed tomatoes  1/2 medium green bell pepper chopped  1/2 medium red bell pepper chopped  8 ounces sliced shiitake mushrooms  2 sprigs fresh thyme  2 bay leaves  Fresh parsley for garnish  Grated Parmesan cheese Instructions 1. Generously season the chicken with the salt and pepper. Heat a large nonstick skillet over medium-high heat.  Coat with cooking spray or a drizzle of olive oil and then add the chicken. Cook until nicely browned, about 3-4 minutes per side. Transfer to a slow cooker. 2. Reduce the heat under the skillet to medium and coat with more cooking spray or another drizzle of olive oil. Add the garlic and onion and cook until soft, about 3-4 minutes, stirring occasionally. Transfer to the slow cooker. 3. Add the tomatoes, rankin peppers, mushrooms, thyme and bay leaves to the slow cooker. Stir to combine. 4. Cover and cook on high for 4 hours or on low for 8 hours. 5. Discard the bay leaf and transfer the chicken to a large plate or cutting board. Pull the chicken meat from the bones (discard the bones), shred the meat and return it to the sauce. Stir in the parsley. Serve with pasta, polenta or spaghetti squash topped with Parmesan cheese. Nutrition Facts Slow Cooker Chicken Cacciatore Amount Per Serving Calories 205 Calories from Fat 54  
% Daily Value* Fat 6g9% Saturated Fat 1g6% Cholesterol 581ex49% Sodium 386rb59% Potassium 339zw18% Carbohydrates 12g4% Fiber 3g13% Sugar 6g7% Protein 27g54% Vitamin A 707UA80% Vitamin C 27mg33% Calcium 52mg5% Iron 3mg17% * Percent Daily Values are based on a 2000 calorie diet. (  
[employer] This one pot white bean and kale soup is loaded with nutritious ingredients. This vegetarian soup is hearty and packed with plant-based protein (quinoa & white beans), but you can add in some shredded, cooked chicken if desired. Course Dinner Cuisine American Keyword [employer] Prep Time 25 minutes Cook Time 20 minutes Total Time 45 minutes Servings 6 servings Calories 222kcal 
Cost $5.35 Ingredients  3 tablespoons olive oil  1 cup EACH: diced carrots, diced onion, diced celery (~2 large carrots, 3 celery stalks, and 1 small onion)  1 heaping tablespoon minced garlic (~4 cloves)  1/4 cup tomato paste  1 teaspoon dried parsley  1/2 teaspoon EACH: dried oregano, dried basil  1/4 teaspoon EACH: dried crushed rosemary, dried thyme, pepper  2 teaspoons [place] seasoning  3/4 teaspoon fine sea salt  2 bay leaves  2 cans (14.5 ounces EACH) fire-roasted diced tomatoes (I love Marindia or [place])  6 cups vegetable stock or broth (can use chicken broth if not vegetarian)  3/4 cup white quinoa, (See Note 1)  1 can (15 ounces) white cannellini or [place] white beans, drained and rinsed  2 cups (loosely packed) coarsely chopped kale  Finishing touches: 1 more tablespoon olive oil, 1 tablespoon lemon juice, freshly grated Parmesan cheese, fresh parsley (optional) Instructions  VEGGIES: Add 3 tablespoons olive oil to a large cast iron pot. Heat to medium and once oil is hot, add the 1 cup diced carrots, 1 cup diced celery, 1 cup diced onion, and heaping 1 tablespoon minced garlic. Add a tiny pinch of salt and cook, stirring often, until veggies are tender, about 7-9 minutes. Add in the tomato paste and seasonings (dried parsley, dried oregano, dried basil, dried rosemary, dried thyme, pepper, [place] seasoning, and sea salt.) Cook and stir until fragrant, about another 30 seconds.  SIMMER: Add both cans of undrained, fire-roasted diced tomatoes and stir for 1 minute still over medium heat. Add in the 2 bay leaves and rinsed quinoa. Stir and add in the 6 cups vegetable stock. Increase heat to high and bring the soup to a boil. Once boiling, reduce the heat to low and simmer for 15 minutes. (Soup should be maintaining a simmer, but not boiling). Stir occasionally.  FINISHING: After 15 minutes, add in the 1 can of drained and rinsed white beans and kale. Stir and cook for 3-4 more minutes or until Liz Roughen has fully \"popped\" and kale is softened. Finish the soup with 1 more tablespoon olive oil and, if desired, 1 tablespoon lemon juice. Taste and adjust seasonings as needed. Remove the bay leaves and serve.  
 SERVE: Garnish bowls of soup with grated Parmesan cheese and chopped fresh parsley, if desired.  STORAGE: Soup will continue to thicken as the quinoa expands. We still think it tastes great, but it does get thicker and thicker as it stands. This soup is best eaten within 2-3 days. It does not freeze well; the soup continues to absorb liquid and gets overly bloated.  SLOW COOKER INSTRUCTIONS: Follow step one and then transfer everything from that pot to a 6 quart crockpot. Add in the diced tomatoes, stock, rinsed quinoa, drained and rinsed white beans, and bay leaves. Cook on high for 2-3 hours or low for 3-5 hours or until quinoa has popped and veggies are tender. Stir in kale, olive oil, and lemon juice. Remove bay leaves. Adjust salt and pepper; serve in bowls garnished with fresh parsley and topped with Parmesan cheese. Notes Note 1: If the quinoa isn't already rinsed (it will say on the package, if it is), add it to a fine mesh sieve and rinse under cold water. This removes the saponin coating which can cause quinoa to taste bitter. Nutrition Servinservings  Calories: 222kcal  Carbohydrates: 33g  Protein: 7.5g  Fat: 8g  Sodium: 1049.4mg  Fiber: 8.4g  Sugar: 9.2g Slow Cooker [employer] ? 1 from 1 reviews  Prep Time: 10 mins Heidi Varner Time: 8 hours  Total Time: 8 hours 10 minutes  Yield: 6-8 servings Ingredients  2 tablespoons extra virgin olive oil  2 tablespoons minced garlic (about 4 cloves)  1 cup diced onion (about 1 medium)  1½ cups sliced carrots (about ½ pound)  4 stalks celery, sliced  1 pound dry navy beans, rinsed and picked over to remove any debris  1 bay leaf (left whole)  1 teaspoon dried crushed rosemary  ½ teaspoon dried thyme leaves  ½ teaspoon smoked paprika  Freshly ground black pepper (15-20 cranks from a pepper mill)  6 cups water  ½ cup prepared pesto (see our recipe here or your favorite miguel version)  1 teaspoon salt, plus more to taste Instructions 1.  In a large (5-quart+) slow cooker, pour in the olive oil and tip to coat the bottom of the slow cooker insert. 2. Add all of the other ingredients  EXCEPT the salt and the pesto  to the slow cooker and stir to combine. 3. Place the lid on the slow cooker and cook on low for 8 hours. 4. After 8 hours, remove the lid and stir the soup. Place half of the soup into a  and process until smooth (leave the other half in the slow cooker). Pour the pureed soup back into the slow cooker with the un-pureed soup and stir to combine. 5. Stir in the pesto plus 1 teaspoon salt and stir again to combine. Add additional salt and pepper to taste if desired. Crock Pot Chicken Thighs with Artichokes and Sun-Dried Tomatoes Prep Time5 mins Cook Time5 hrs Total Time5 hrs 5 mins Melt-in-your-mouth chicken thighs prepared in the crock pot with artichoke hearts and sun-dried tomatoes. Course: Dinner Cuisine: American Servings: 6 serves Calories: 304 Author: Brand [employer] Ingredients  ? 6 to 8 boneless, skinless chicken thighs  ? salt and fresh ground pepper , to taste  ? 1/2 teaspoon sweet or smoked paprika  ? 1/2 tablespoon dried oregano  ? 1 jar (14.75-ounces) grilled artichoke hearts, drained, 1/3-cup liquid reserved  ? 4 cloves garlic, minced  ? 1/3 cup artichoke hearts liquid  ? 1 bag (3.5-ounces) Josy Cut Sun-Dried Tomatoes  ? 3 tablespoons chopped fresh parsley Instructions  Spray 6-quart crock pot/slow cooker with cooking spray.  Season chicken thighs with salt, pepper, paprika, and dried oregano; add to slow cooker in one layer.  Add artichoke hearts over the chicken; sprinkle with garlic.  Take 1/3-cup of the liquid from the jar with the artichoke hearts and pour it over the top.  Cover; cook on HIGH for 4 to 4-1/2 hours, or on LOW for about 6 hours.  Add sun-dried tomatoes 30 minutes before it's done cooking; cover and continue to cook.  
 Remove cover at the end of the cooking time.  Sprinkle with fresh parsley and serve. Notes [employer] FREESTYLE POINTS: 5  
NET CARBS: 10 grams Nutrition Facts Crock Pot Chicken Thighs with Artichokes and Sun-Dried Tomatoes Amount Per Serving (1 g) Calories 304 Calories from Fat 117  
% Daily Value* Fat 13g20% Saturated Fat 2g10% Cholesterol 369qd14% Sodium 974jx50% Potassium 504ey85% Carbohydrates 14g5% Fiber 4g16% Sugar 7g8% Protein 32g64% Vitamin A 4420TE54% Vitamin C 24mg29% Calcium 59mg6% Iron 4mg22% Slow Cooker Garlic Parmesan Chicken and Potatoes Yield: 4 servings 
prep time: 10 minutes 
cook time: 8 hours 
total time: 8 hours 10 minutes Crisp-tender chicken cooked low and slow with baby red potatoes for a full meal! So easy and effortless! Print Recipe Ingredients:  8 bone-in, skin-on chicken thighs  1/2 teaspoon dried basil  1/2 teaspoon dried oregano  1/4 teaspoon dried rosemary  Kosher salt and freshly ground black pepper, to taste  2 tablespoons unsalted butter  2 pounds baby red potatoes, quartered  2 tablespoons olive oil  4 cloves garlic, minced  1/2 teaspoon dried thyme  1 cup freshly grated Parmesan  2 tablespoons chopped fresh parsley leaves Directions: 1. Season chicken with basil, oregano, rosemary, salt and pepper, to taste. 2. Melt butter in a large skillet over medium high heat. Add chicken, skin-side down, and sear both sides until odom brown, about 2-3 minutes per side; drain excess fat and set chicken aside. 3. Place potatoes into a 6-qt slow cooker. Stir in olive oil, garlic and thyme; season with salt and pepper, to taste. Add chicken to the slow cooker in an even  layer. 4. Cover and cook on low heat for 7-8 hours or high for 3-4 hours, or until the chicken is completely cooked through, reaching an internal temperature of 165 degrees F. 
5. Serve immediately, sprinkled with Parmesan and garnished with parsley, if desired. Adapted from [facility] Slow Cooker [facility] Chicken 4.77 from 46 votes Slow Cooker Greek Chicken. Moist, juicy chicken with a bright Mediterranean flavors, roasted red peppers, and feta. Easy, healthy, and absolutely delicious! Prep: 20 mins Cook: 3 hrs Total: 3 hrs 20 mins Servings: 4 servings [employer]Save to [employer] Ingredients  1 tablespoon extra-virgin olive oil  2 pounds boneless skinless chicken breasts or thighs  1/2 teaspoon kosher salt  1/4 teaspoon ground black pepper  1 jar roasted red peppers (12 ounces), drained and chopped  1 cup kalamata olives  1 medium red onion cut into 1/2-inch chunks  3 tablespoons red wine vinegar  1 tablespoon minced garlic from about 3 large cloves  1 teaspoon honey  1 teaspoon dried oregano  1 teaspoon dried thyme leaves  1/2 cup feta cheese optional for serving  Chopped fresh herbs: any mix of basil parsley, or thyme, optional for serving (I used fresh thyme) Instructions  Lightly coat a 5-quart or larger slow cooker with nonstick spray. Heat the oil in a large skillet over medium high. Sprinkle the chicken with salt and pepper, then place it in the hot pan, seasoned side down. Let brown on the first side for 1 to 2 minutes until odom, then flip and brown on the other side for 1 additional minute. If your skillet isnt large enough to fit all of the chicken pieces, work in batches so that the chicken does not overlap. Transfer the chicken to the slow cooker.  Arrange the peppers, olives, and onions around the chicken (do not place on top).  In a small bowl or measuring cup, whisk together the red wine vinegar, garlic, honey, oregano, and thyme. Pour over the chicken and vegetables. Cover and cook for 1 1/2 to 2 hours on high or 3 to 4 hours on low, until the chicken is cooked through and the center reaches 165 degrees F. Serve warm, sprinkled with feta and fresh herbs. Notes  All slow cookers differ in temperature and cooking times, so adjust the cooking time according to your experience with your model. When in doubt, check early to ensure the chicken does not overcook.  Store leftovers in the refrigerator for up to 4 days. Since chicken can be hard to reheat, I recommend cutting it into smaller pieces, then reheating gently in the microwave with a splash of chicken stock. You can also enjoy leftover chicken at room temperature over salads or try mixing it into hot pasta. Nutrition Servin(of 4)Calories: 399kcalCarbohydrates: 13gProtein: 50gFat: 17gSaturated Fat: 4gCholesterol: 140mgSodium: 1093mgFiber: 2gSugar: 9g The Best Detox Crockpot [employer]  Author: Deneen  Prep Time: 30 mins Anu Clear Time: 6 hours  Total Time: 6 hours 30 minutes  Yield: 8 servings 1x Description Detox Crockpot [employer]  a clean and simple soup made with onions, garlic, carrots, olive oil, squash, and LENTILS! Super healthy and easy to make. Ingredients For the crockpot: 
 2 cups butternut squash (peeled and cubed)  2 cups carrots (peeled and sliced)  2 cups potatoes (chopped)  2 cups celery (chopped)  1 cup green lentils  3/4 cup yellow split peas (or just use more lentils)  1 onion (chopped)  5 cloves garlic (minced)  810 cups vegetable or chicken broth  2 teaspoons herbs de provence  1 teaspoon salt (more to taste) Add at the end:  23 cups kale (stems removed, chopped)  1 cup parsley (chopped)  1/2 cup olive oil  rosemary olive oil or other herb infused oil is delicious  a swish of hattie, red wine vinegar, or lemon juice to add a nice tangy bite Instructions 1. Place all ingredients in the crockpot. Cover and cook on high for 5-6 hours or low for 7-8 hours. 2. Place about 4 cups of soup in a  with the olive oil. Pulse gently until semi-smooth and creamy-looking (the oil will form a creamy emulsion with the soup). Add back to the pot and stir to combine. Stir in the kale and parsley. Turn the heat off and just let everything chill out for a bit before serving. The taste gets better with time and so does the texture, IMO! 3. Season to taste (add the hattie, vinegar, and/or lemon juice at this point) and to really go next level, serve with crusty wheat bread and a little Parmesan cheese. Its called Detox Balance. Equipment Instant Pot Instructions: Place same first group of ingredients in the Instant Pot and cook on the soup setting for 30 mins with a quick release. Follow the same blending instructions and then add kale and parsley. Freezer Meal Version 1. Freeze together: 
2 cups chopped butternut squash, fresh or frozen 2 cups mirepoix, fresh or frozen 1-2 cups chopped potatoes, fresh or frozen 1 cup uncooked brown lentils 3 cloves garlic, minced 1 teaspoon herbes de Prov[place] (or other spices you like) 1/2 teaspoon salt (more to taste) 4-5 cups vegetable or chicken broth 2. Instant Pot Instructions: From frozen, 15 minutes on high pressure + 10 minutes natural release. 3. Slow Cooker Instructions: From frozen, 6 hours on high. 4. Final Step: Blend a little of the soup with 1/4 cup olive oil, return to pot, and stir in 1-2 cups chopped kale. Finish with some lemon juice or red wine vinegar. Yield: 6 servings 4.95 from 19 votes 1 [facility] Prep Time: 15 mins Cook Time: 4 hrs Total Time: 4 hrs 15 mins Course: Dinner Cuisine: [facility] Delicious [facility] turkey meatballs, all made in the slow cooker. No baking or frying required! Ingredients  20 oz 1.3 lb ground turkey breast 93% lean  1/4 cup whole wheat seasoned breadcrumbs  1/4 cup Reggiano Parmigiano cheese (grated)  1/4 cup parsley (finely chopped)  1 egg  1 large clove garlic (minced)  1 tsp kosher salt For the sauce:  1 tsp olive oil  4 cloves garlic (smashed)  28 oz can crushed tomatoes (I like Tuttorosso)  1 bay leaf 
 salt and fresh pepper to taste  1/4 cup fresh chopped basil or parsley Save Instructions  In a large bowl, combine ground turkey, breadcrumbs, egg, parsley, garlic and cheese.  Using clean hands, mix all the ingredients and form small meatballs, about 1/8th cup each.  In a small saute pan, heat olive oil over medium heat. Add garlic and saute until odom, being careful not to burn.  Pour crushed tomatoes into the crock pot with bay leaf. Add garlic and oil.  Drop meatballs into the sauce, cover and set crock pot to low, 4 to 6 hours.  When meatballs are ready, adjust salt and pepper to taste and add fresh chopped basil or parsley.  Serve with ricotta, over pasta or enjoy with Western Zahira bread. Video Nutrition Servinmeatballs with sauce, Calories: 200.5kcal, Carbohydrates: 12.6g, Protein: 17.3g, Fat: 8g, Saturated Fat: 2.5g, Cholesterol: 84.1mg, Sodium: 427.5mg, Fiber: 0.5g, Sugar: 4.5g Blue Smart Points: 4 Green Smart Points: 4 Purple Smart Points: 4 Points +: 5 Printed from [employer]: [email or web address]

## 2021-07-26 ENCOUNTER — HOSPITAL ENCOUNTER (OUTPATIENT)
Dept: LAB | Age: 36
Discharge: HOME | End: 2021-07-26
Payer: COMMERCIAL

## 2021-07-26 ENCOUNTER — OFFICE VISIT (OUTPATIENT)
Dept: FAMILY MEDICINE CLINIC | Age: 36
End: 2021-07-26
Payer: COMMERCIAL

## 2021-07-26 VITALS
BODY MASS INDEX: 45.31 KG/M2 | TEMPERATURE: 98.2 F | OXYGEN SATURATION: 97 % | HEART RATE: 98 BPM | RESPIRATION RATE: 18 BRPM | WEIGHT: 299 LBS | DIASTOLIC BLOOD PRESSURE: 73 MMHG | HEIGHT: 68 IN | SYSTOLIC BLOOD PRESSURE: 105 MMHG

## 2021-07-26 DIAGNOSIS — I10 ESSENTIAL HYPERTENSION: Primary | ICD-10-CM

## 2021-07-26 DIAGNOSIS — R73.03 PRE-DIABETES: ICD-10-CM

## 2021-07-26 DIAGNOSIS — I10 ESSENTIAL HYPERTENSION: ICD-10-CM

## 2021-07-26 DIAGNOSIS — E78.00 ELEVATED LDL CHOLESTEROL LEVEL: ICD-10-CM

## 2021-07-26 DIAGNOSIS — E55.9 VITAMIN D DEFICIENCY: ICD-10-CM

## 2021-07-26 DIAGNOSIS — E03.9 ACQUIRED HYPOTHYROIDISM: ICD-10-CM

## 2021-07-26 LAB
25(OH)D3 SERPL-MCNC: 39 NG/ML (ref 30–100)
ALBUMIN SERPL-MCNC: 4.4 G/DL (ref 3.4–5)
ALBUMIN/GLOB SERPL: 1.1 {RATIO} (ref 0.8–1.7)
ALP SERPL-CCNC: 81 U/L (ref 45–117)
ALT SERPL-CCNC: 39 U/L (ref 16–61)
ANION GAP SERPL CALC-SCNC: 6 MMOL/L (ref 3–18)
AST SERPL-CCNC: 13 U/L (ref 10–38)
BILIRUB SERPL-MCNC: 1 MG/DL (ref 0.2–1)
BUN SERPL-MCNC: 15 MG/DL (ref 7–18)
BUN/CREAT SERPL: 14 (ref 12–20)
CALCIUM SERPL-MCNC: 9.7 MG/DL (ref 8.5–10.1)
CHLORIDE SERPL-SCNC: 102 MMOL/L (ref 100–111)
CHOLEST SERPL-MCNC: 187 MG/DL
CO2 SERPL-SCNC: 28 MMOL/L (ref 21–32)
CREAT SERPL-MCNC: 1.05 MG/DL (ref 0.6–1.3)
EST. AVERAGE GLUCOSE BLD GHB EST-MCNC: 120 MG/DL
GLOBULIN SER CALC-MCNC: 4 G/DL (ref 2–4)
GLUCOSE SERPL-MCNC: 94 MG/DL (ref 74–99)
HBA1C MFR BLD: 5.8 % (ref 4.2–5.6)
HDLC SERPL-MCNC: 38 MG/DL (ref 40–60)
HDLC SERPL: 4.9 {RATIO} (ref 0–5)
LDLC SERPL CALC-MCNC: 125 MG/DL (ref 0–100)
LIPID PROFILE,FLP: ABNORMAL
POTASSIUM SERPL-SCNC: 4.6 MMOL/L (ref 3.5–5.5)
PROT SERPL-MCNC: 8.4 G/DL (ref 6.4–8.2)
SODIUM SERPL-SCNC: 136 MMOL/L (ref 136–145)
T4 FREE SERPL-MCNC: 1.2 NG/DL (ref 0.7–1.5)
TRIGL SERPL-MCNC: 120 MG/DL (ref ?–150)
TSH SERPL DL<=0.05 MIU/L-ACNC: 1.37 UIU/ML (ref 0.36–3.74)
VLDLC SERPL CALC-MCNC: 24 MG/DL

## 2021-07-26 PROCEDURE — 82306 VITAMIN D 25 HYDROXY: CPT

## 2021-07-26 PROCEDURE — 99214 OFFICE O/P EST MOD 30 MIN: CPT | Performed by: NURSE PRACTITIONER

## 2021-07-26 PROCEDURE — 83036 HEMOGLOBIN GLYCOSYLATED A1C: CPT

## 2021-07-26 PROCEDURE — 80053 COMPREHEN METABOLIC PANEL: CPT

## 2021-07-26 PROCEDURE — 84443 ASSAY THYROID STIM HORMONE: CPT

## 2021-07-26 PROCEDURE — 84439 ASSAY OF FREE THYROXINE: CPT

## 2021-07-26 PROCEDURE — 36415 COLL VENOUS BLD VENIPUNCTURE: CPT

## 2021-07-26 PROCEDURE — 80061 LIPID PANEL: CPT

## 2021-07-26 NOTE — PATIENT INSTRUCTIONS
Learning About the [facility] [facility] Diet  What is the Mediterranean diet? The Mediterranean diet is a style of eating rather than a diet plan. It features foods eaten in [place], [place], [place], and other countries along the [facility]. It emphasizes eating foods like fish, fruits, vegetables, beans, high-fiber breads and whole grains, nuts, and olive oil. This style of eating includes limited red meat, cheese, and sweets. Why choose the Mediterranean diet? A Mediterranean-style diet may improve heart health. It contains more fat than other heart-healthy diets. But the fats are mainly from nuts, unsaturated oils (such as fish oils and olive oil), and certain nut or seed oils (such as canola, soybean, or flaxseed oil). These fats may help protect the heart and blood vessels. How can you get started on the Mediterranean diet? Here are some things you can do to switch to a more Mediterranean way of eating. What to eat  · Eat a variety of fruits and vegetables each day, such as grapes, blueberries, tomatoes, broccoli, peppers, figs, olives, spinach, eggplant, beans, lentils, and chickpeas. · Eat a variety of whole-grain foods each day, such as oats, brown rice, and whole wheat bread, pasta, and couscous. · Eat fish at least 2 times a week. Try tuna, salmon, mackerel, lake trout, herring, or sardines. · Eat moderate amounts of low-fat dairy products, such as milk, cheese, or yogurt. · Eat moderate amounts of poultry and eggs. · Choose healthy (unsaturated) fats, such as nuts, olive oil, and certain nut or seed oils like canola, soybean, and flaxseed. · Limit unhealthy (saturated) fats, such as butter, palm oil, and coconut oil. And limit fats found in animal products, such as meat and dairy products made with whole milk. Try to eat red meat only a few times a month in very small amounts. · Limit sweets and desserts to only a few times a week.  This includes sugar-sweetened drinks like soda.  The Mediterranean diet may also include red wine with your meal1 glass each day for women and up to 2 glasses a day for men. Tips for eating at home  · Use herbs, spices, garlic, lemon zest, and citrus juice instead of salt to add flavor to foods. · Add avocado slices to your sandwich instead of thomas. · Have fish for lunch or dinner instead of red meat. Brush the fish with olive oil, and broil or grill it. · Sprinkle your salad with seeds or nuts instead of cheese. · Cook with olive or canola oil instead of butter or oils that are high in saturated fat. · Switch from 2% milk or whole milk to 1% or fat-free milk. · Dip raw vegetables in a vinaigrette dressing or hummus instead of dips made from mayonnaise or sour cream.  · Have a piece of fruit for dessert instead of a piece of cake. Try baked apples, or have some dried fruit. Tips for eating out  · Try broiled, grilled, baked, or poached fish instead of having it fried or breaded. · Ask your  to have your meals prepared with olive oil instead of butter. · Order dishes made with marinara sauce or sauces made from olive oil. Avoid sauces made from cream or mayonnaise. · Choose whole-grain breads, whole wheat pasta and pizza crust, brown rice, beans, and lentils. · Cut back on butter or margarine on bread. Instead, you can dip your bread in a small amount of olive oil. · Ask for a side salad or grilled vegetables instead of french fries or chips. Where can you learn more? Go to http://www.gross.com/  Enter O407 in the search box to learn more about \"Learning About the Mediterranean Diet. \"  Current as of: August 22, 2019               Content Version: 12.6  © 9813-9068 [employer]. Care instructions adapted under license by [employer] (which disclaims liability or warranty for this information).  If you have questions about a medical condition or this instruction, always ask your healthcare professional. [address] any warranty or liability for your use of this information.

## 2021-07-26 NOTE — PROGRESS NOTES
Exam Room 8      1. Have you been to the ER, urgent care clinic since your last visit? Hospitalized since your last visit? No       2. Have you seen or consulted any other health care providers outside of the [address] [place] [address] since your last visit? Include any pap smears or colon screening.  No      Health Maintenance Due   Topic Date Due    Hepatitis C Screening  Never done    COVID-19 Vaccine (1) Never done    DTaP/Tdap/Td series (1 - Tdap) Never done

## 2021-07-26 NOTE — PROGRESS NOTES
[facility], [address]               [phone]      Peggy Helm is a 28 y.o. male and presents with Follow Up Chronic Condition, Hypertension, Cholesterol Problem, and Thyroid Problem       Assessment/Plan:    Diagnoses and all orders for this visit:    1. Essential hypertension  -     METABOLIC PANEL, COMPREHENSIVE; Future  Endorses medication compliance, Follow-up labs today, Have asked him/her to come in for follow-up labs and Blood pressure stable, continue same medications   2. Acquired hypothyroidism  -     TSH 3RD GENERATION; Future  -     T4, FREE; Future  Endorses medication compliance, Follow-up labs today, Have asked him/her to come in for follow-up labs and Denies signs and symptoms of hyper or hypothyroidism   3. Elevated LDL cholesterol level  -     LIPID PANEL; Future  Follow-up labs today and Have asked him/her to come in for follow-up labs   4. Pre-diabetes  -     HEMOGLOBIN A1C WITH EAG; Future  Follow-up labs today, Have asked him/her to come in for follow-up labs and Denies signs and symptoms of hyper or hypoglycemia   5. Vitamin D deficiency  -     VITAMIN D, 25 HYDROXY; Future  Follow-up labs today and Have asked him/her to come in for follow-up labs       Follow-up and Dispositions    · Return in about 6 months (around 1/26/2022) for HTN, HLD, hypothyroid, 15 min, office only, AND, labs prior. Subjective:    Hypertension:   Patient reports taking medications as instructed. yes   Medication side effects noted. no  Headache upon wakening. no   Home BP monitoring in range of 814/85'M systolic. Do you experience chest pain/pressure or SOB with exertion? no  Maintain a low salt diet?  yes  Key CAD CHF Meds             lisinopril-hydroCHLOROthiazide (PRINZIDE, ZESTORETIC) 20-25 mg per tablet (Taking) TAKE 1 TABLET BY MOUTH DAILY    amLODIPine (NORVASC) 10 mg tablet (Taking) TAKE 1 TABLET BY MOUTH DAILY        Thyroid Review:  Patient is seen for followup of hypothyroidism. Taking medications daily either 30 minutes prior to or 2 hours after eating? YES  Thyroid ROS: denies fatigue, weight changes, heat/cold intolerance, constipation, diarrhea, fatigue, weakness, edema, paresthesia, irregular menses, hair loss, vocal changes, CVS symptoms. TSH   Date Value Ref Range Status   07/26/2021 1.37 0.36 - 3.74 uIU/mL Final   01/28/2021 1.85 0.40 - 4.50 mIU/L Final   08/23/2019 1.26 0.36 - 3.74 uIU/mL Final   03/26/2019 1.00 0.36 - 3.74 uIU/mL Final   11/08/2018 1.10 0.36 - 3.74 uIU/mL Final     T3, total   Date Value Ref Range Status   08/08/2018 87 71 - 180 ng/dL Final     Comment:     (NOTE)  Performed At: [address] [id number]  Eric Barth MD [id number]       T4, Free   Date Value Ref Range Status   07/26/2021 1.2 0.7 - 1.5 NG/DL Final   08/23/2019 1.2 0.7 - 1.5 NG/DL Final   08/08/2018 1.2 0.7 - 1.5 NG/DL Final   05/08/2018 1.3 0.7 - 1.5 NG/DL Final         ROS:     ROS  As stated in HPI, otherwise all others negative. The problem list was updated as a part of today's visit. Patient Active Problem List   Diagnosis Code    [facility] E[facility]    Hypertension I10    Acquired hypothyroidism E03.9    Vitamin D deficiency E55.9    Pre-diabetes R73.03    Elevated LDL cholesterol level E78.00       The PSH, FH were reviewed.       SH:  Social History     Tobacco Use    Smoking status: Never Smoker    Smokeless tobacco: Never Used   Vaping Use    Vaping Use: Never used   Substance Use Topics    Alcohol use: Yes     Comment: Occaisionally    Drug use: No       Medications/Allergies:  Current Outpatient Medications on File Prior to Visit   Medication Sig Dispense Refill    levothyroxine (SYNTHROID) 25 mcg tablet TAKE 1 TABLET BY MOUTH DAILY BEFORE BREAKFAST 90 Tab 1    lisinopril-hydroCHLOROthiazide (PRINZIDE, ZESTORETIC) 20-25 mg per tablet TAKE 1 TABLET BY MOUTH DAILY 90 Tab 1    amLODIPine (NORVASC) 10 mg tablet TAKE 1 TABLET BY MOUTH DAILY 90 Tab 1    cholecalciferol (VITAMIN D3) 1,000 unit cap Take  by mouth daily. No current facility-administered medications on file prior to visit. No Known Allergies    Objective:  Visit Vitals  /73   Pulse 98   Temp 98.2 °F (36.8 °C) (Temporal)   Resp 18   Ht 5' 8\" (1.727 m)   Wt 299 lb (135.6 kg)   SpO2 97%   BMI 45.46 kg/m²    Body mass index is 45.46 kg/m². Physical assessment  Physical Exam  Vitals and nursing note reviewed. Eyes:      Conjunctiva/sclera: Conjunctivae normal.      Pupils: Pupils are equal, round, and reactive to light. Neck:      Vascular: No JVD. Cardiovascular:      Rate and Rhythm: Normal rate and regular rhythm. Heart sounds: Normal heart sounds. No murmur heard. No friction rub. No gallop. Pulmonary:      Effort: Pulmonary effort is normal.      Breath sounds: Normal breath sounds. Musculoskeletal:         General: Normal range of motion. Cervical back: Normal range of motion. Skin:     General: Skin is warm and dry. Neurological:      Mental Status: He is alert and oriented to person, place, and time.    Psychiatric:         Mood and Affect: Affect normal.         Cognition and Memory: Memory normal.         Judgment: Judgment normal.           Labwork and Ancillary Studies:    CBC w/Diff  Lab Results   Component Value Date/Time    WBC 10.1 08/23/2019 09:16 AM    HGB 14.9 08/23/2019 09:16 AM    PLATELET 540 20/08/2171 09:16 AM         Basic Metabolic Profile  Lab Results   Component Value Date/Time    Sodium 136 07/26/2021 01:42 PM    Potassium 4.6 07/26/2021 01:42 PM    Chloride 102 07/26/2021 01:42 PM    CO2 28 07/26/2021 01:42 PM    Anion gap 6 07/26/2021 01:42 PM    Glucose 94 07/26/2021 01:42 PM    BUN 15 07/26/2021 01:42 PM    Creatinine 1.05 07/26/2021 01:42 PM    BUN/Creatinine ratio 14 07/26/2021 01:42 PM    GFR est AA >60 07/26/2021 01:42 PM    GFR est non-AA >60 07/26/2021 01:42 PM    Calcium 9.7 07/26/2021 01:42 PM        Cholesterol  Lab Results   Component Value Date/Time    Cholesterol, total 187 07/26/2021 01:42 PM    HDL Cholesterol 38 (L) 07/26/2021 01:42 PM    LDL-CHOLESTEROL 129 (H) 01/28/2021 02:35 PM    LDL, calculated 125 (H) 07/26/2021 01:42 PM    Triglyceride 120 07/26/2021 01:42 PM    CHOL/HDL Ratio 4.9 07/26/2021 01:42 PM    Cholesterol/HDL ratio 5.1 (H) 01/28/2021 02:35 PM       Health Maintenance:   Health Maintenance   Topic Date Due    Hepatitis C Screening  Never done    COVID-19 Vaccine (1) Never done    DTaP/Tdap/Td series (1 - Tdap) Never done    Flu Vaccine (1) 09/01/2021    A1C test (Diabetic or Prediabetic)  07/26/2022    Pneumococcal 0-64 years  Aged Out       I have discussed the diagnosis with the patient and the intended plan as seen in the above orders. The patient has received an After-Visit Summary and questions were answered concerning future plans. An After Visit Summary was printed and given to the patient. All diagnosis have been discussed with the patient and all of the patient's questions have been answered. Follow-up and Dispositions    · Return in about 6 months (around 1/26/2022) for HTN, HLD, hypothyroid, 15 min, office only, AND, labs prior. [facility]  [address]  [address]

## 2021-08-12 ENCOUNTER — PATIENT MESSAGE (OUTPATIENT)
Dept: FAMILY MEDICINE CLINIC | Age: 36
End: 2021-08-12

## 2021-08-13 NOTE — TELEPHONE ENCOUNTER
From: Alfa Lewis  To: Camila Arguelles  Sent: 8/12/2021 10:00 AM EDT  Subject: Non-Urgent Bobbi Dormanell    Good morning, the last few days my blood pressure has been oddly low 92/70-85/60  Is this a cause for concern?  I have felt a little sluggish     Alfa Lewis

## 2021-10-22 DIAGNOSIS — E03.9 ACQUIRED HYPOTHYROIDISM: ICD-10-CM

## 2021-10-22 DIAGNOSIS — I10 ESSENTIAL HYPERTENSION: ICD-10-CM

## 2021-10-24 RX ORDER — AMLODIPINE BESYLATE 10 MG/1
TABLET ORAL
Qty: 90 TABLET | Refills: 1 | Status: SHIPPED | OUTPATIENT
Start: 2021-10-24 | End: 2022-05-25 | Stop reason: SDUPTHER

## 2021-10-24 RX ORDER — LISINOPRIL AND HYDROCHLOROTHIAZIDE 20; 25 MG/1; MG/1
TABLET ORAL
Qty: 90 TABLET | Refills: 1 | Status: SHIPPED | OUTPATIENT
Start: 2021-10-24 | End: 2022-05-25 | Stop reason: SDUPTHER

## 2021-10-24 RX ORDER — LEVOTHYROXINE SODIUM 25 UG/1
TABLET ORAL
Qty: 90 TABLET | Refills: 1 | Status: SHIPPED | OUTPATIENT
Start: 2021-10-24 | End: 2022-05-25 | Stop reason: SDUPTHER

## 2021-11-07 LAB — SARS-COV-2, NAA: NOT DETECTED

## 2022-01-19 ENCOUNTER — HOSPITAL ENCOUNTER (OUTPATIENT)
Dept: LAB | Age: 37
Discharge: HOME | End: 2022-01-19
Payer: COMMERCIAL

## 2022-01-19 LAB
ALBUMIN SERPL-MCNC: 4.2 G/DL (ref 3.4–5)
ALBUMIN/GLOB SERPL: 1.1 {RATIO} (ref 0.8–1.7)
ALP SERPL-CCNC: 97 U/L (ref 45–117)
ALT SERPL-CCNC: 27 U/L (ref 16–61)
ANION GAP SERPL CALC-SCNC: 3 MMOL/L (ref 3–18)
AST SERPL-CCNC: 10 U/L (ref 10–38)
BILIRUB SERPL-MCNC: 0.7 MG/DL (ref 0.2–1)
BUN SERPL-MCNC: 15 MG/DL (ref 7–18)
BUN/CREAT SERPL: 16 (ref 12–20)
CALCIUM SERPL-MCNC: 9.6 MG/DL (ref 8.5–10.1)
CHLORIDE SERPL-SCNC: 104 MMOL/L (ref 100–111)
CHOLEST SERPL-MCNC: 159 MG/DL
CO2 SERPL-SCNC: 30 MMOL/L (ref 21–32)
CREAT SERPL-MCNC: 0.95 MG/DL (ref 0.6–1.3)
GLOBULIN SER CALC-MCNC: 3.7 G/DL (ref 2–4)
GLUCOSE SERPL-MCNC: 103 MG/DL (ref 74–99)
HDLC SERPL-MCNC: 42 MG/DL (ref 40–60)
HDLC SERPL: 3.8 {RATIO} (ref 0–5)
LDLC SERPL CALC-MCNC: 101.2 MG/DL (ref 0–100)
LIPID PROFILE,FLP: ABNORMAL
POTASSIUM SERPL-SCNC: 4.4 MMOL/L (ref 3.5–5.5)
PROT SERPL-MCNC: 7.9 G/DL (ref 6.4–8.2)
SODIUM SERPL-SCNC: 137 MMOL/L (ref 136–145)
T4 FREE SERPL-MCNC: 1.1 NG/DL (ref 0.7–1.5)
TRIGL SERPL-MCNC: 79 MG/DL (ref ?–150)
TSH SERPL DL<=0.05 MIU/L-ACNC: 1.17 UIU/ML (ref 0.36–3.74)
VLDLC SERPL CALC-MCNC: 15.8 MG/DL

## 2022-01-19 PROCEDURE — 84439 ASSAY OF FREE THYROXINE: CPT

## 2022-01-19 PROCEDURE — 36415 COLL VENOUS BLD VENIPUNCTURE: CPT

## 2022-01-19 PROCEDURE — 80053 COMPREHEN METABOLIC PANEL: CPT

## 2022-01-19 PROCEDURE — 80061 LIPID PANEL: CPT

## 2022-01-26 ENCOUNTER — VIRTUAL VISIT (OUTPATIENT)
Dept: FAMILY MEDICINE CLINIC | Age: 37
End: 2022-01-26
Payer: COMMERCIAL

## 2022-01-26 DIAGNOSIS — R73.03 PRE-DIABETES: ICD-10-CM

## 2022-01-26 DIAGNOSIS — E03.9 ACQUIRED HYPOTHYROIDISM: Primary | ICD-10-CM

## 2022-01-26 DIAGNOSIS — E55.9 VITAMIN D DEFICIENCY: ICD-10-CM

## 2022-01-26 DIAGNOSIS — I10 PRIMARY HYPERTENSION: ICD-10-CM

## 2022-01-26 DIAGNOSIS — E78.00 ELEVATED LDL CHOLESTEROL LEVEL: ICD-10-CM

## 2022-01-26 PROCEDURE — 99214 OFFICE O/P EST MOD 30 MIN: CPT | Performed by: NURSE PRACTITIONER

## 2022-01-26 NOTE — PROGRESS NOTES
10: 50AM    1. Have you been to the ER, urgent care clinic since your last visit? Hospitalized since your last visit? No    2. Have you seen or consulted any other health care providers outside of the [address] [place] [address] since your last visit? Include any pap smears or colon screening.  No        Health Maintenance Due   Topic Date Due    Hepatitis C Screening  Never done    COVID-19 Vaccine (1) Never done    DTaP/Tdap/Td series (1 - Tdap) Never done    Flu Vaccine (1) Never done     COVID vaccine  pfizer 10/11/21; 11/1/21

## 2022-01-26 NOTE — PROGRESS NOTES
[place]               [address]               [phone]      Severiano Spalding is a 39 y.o. male who was seen by synchronous (real-time) audio-video technology on 2022 for Follow Up Chronic Condition        Assessment & Plan:   Diagnoses and all orders for this visit:    1. Acquired hypothyroidism  -     TSH 3RD GENERATION; Future  -     T4, FREE; Future  Endorses medication compliance, Follow up labs prior to next visit and Denies signs and symptoms of hyper or hypothyroidism   2. Elevated LDL cholesterol level  -     LIPID PANEL; Future  Managed with diet and exercise and Follow up labs prior to next visit   3. Primary hypertension  -     METABOLIC PANEL, COMPREHENSIVE; Future  Endorses medication compliance, Follow up labs prior to next visit and Blood pressure stable per home checks, continue continue amlodipine and zestoretic   4. Pre-diabetes  -     HEMOGLOBIN A1C WITH EAG; Future  Follow up labs prior to next visit   5. Vitamin D deficiency  -     VITAMIN D, 25 HYDROXY; Future  Follow up labs prior to next visit     Follow-up and Dispositions    · Return in about 6 months (around 2022) for CPE, HTN, HLD, hypothyroid, 30 min, office only. 712  Subjective:     Hypertension:  There were no vitals taken for this visit. Patient reports taking medications as instructed. yes   Medication side effects noted. no  Headache upon wakening. no   Home BP monitorin/90  Do you experience chest pain/pressure or SOB with exertion? no  Maintain a low Sodium diet?  yes  Key CAD CHF Meds             lisinopril-hydroCHLOROthiazide (PRINZIDE, ZESTORETIC) 20-25 mg per tablet (Taking) TAKE 1 TABLET BY MOUTH DAILY    amLODIPine (NORVASC) 10 mg tablet (Taking) TAKE 1 TABLET BY MOUTH DAILY        BUN   Date Value Ref Range Status   2022 15 7.0 - 18 MG/DL Final     Creatinine   Date Value Ref Range Status   2022 0.95 0.6 - 1.3 MG/DL Final     GFR est AA   Date Value Ref Range Status   01/19/2022 >60 >60 ml/min/1.73m2 Final     Potassium   Date Value Ref Range Status   01/19/2022 4.4 3.5 - 5.5 mmol/L Final       HLD:  Has been compliant with meds  No: managed with diet  Compliant with low-fat diet. most of the time    Denies myalgias or other side effects. N/A  The ASCVD Risk score (Karla Clarke, et al., 2013) failed to calculate for the following reasons: The 2013 ASCVD risk score is only valid for ages 36 to 78    Cholesterol, total   Date Value Ref Range Status   01/19/2022 159 <200 MG/DL Final     Triglyceride   Date Value Ref Range Status   01/19/2022 79 <150 MG/DL Final     Comment:     The drugs N-acetylcysteine (NAC) and  Metamiszole have been found to cause falsely  low results in this chemical assay. Please  be sure to submit blood samples obtained  BEFORE administration of either of these  drugs to assure correct results. HDL Cholesterol   Date Value Ref Range Status   01/19/2022 42 40 - 60 MG/DL Final     LDL, calculated   Date Value Ref Range Status   01/19/2022 101.2 (H) 0 - 100 MG/DL Final   ]  Key Antihyperlipidemia Meds     The patient is on no antihyperlipidemia meds. Thyroid Review:  Patient is seen for followup of hypothyroidism. Taking medications daily either 30 minutes prior to or 2 hours after eating? YES  Thyroid ROS: denies fatigue, weight changes, heat/cold intolerance, constipation, diarrhea, fatigue, weakness, edema, paresthesia, irregular menses, hair loss, vocal changes, CVS symptoms.   TSH   Date Value Ref Range Status   01/19/2022 1.17 0.36 - 3.74 uIU/mL Final   07/26/2021 1.37 0.36 - 3.74 uIU/mL Final   01/28/2021 1.85 0.40 - 4.50 mIU/L Final   08/23/2019 1.26 0.36 - 3.74 uIU/mL Final   03/26/2019 1.00 0.36 - 3.74 uIU/mL Final     T3, total   Date Value Ref Range Status   08/08/2018 87 71 - 180 ng/dL Final     Comment:     (NOTE)  Performed At: [address] [id number]  Elissa Prieto MD [id number]       T4, Free   Date Value Ref Range Status   01/19/2022 1.1 0.7 - 1.5 NG/DL Final   07/26/2021 1.2 0.7 - 1.5 NG/DL Final   08/23/2019 1.2 0.7 - 1.5 NG/DL Final   08/08/2018 1.2 0.7 - 1.5 NG/DL Final   05/08/2018 1.3 0.7 - 1.5 NG/DL Final       Prior to Admission medications    Medication Sig Start Date End Date Taking? Authorizing Provider   lisinopril-hydroCHLOROthiazide (PRINZIDE, ZESTORETIC) 20-25 mg per tablet TAKE 1 TABLET BY MOUTH DAILY 10/24/21  Yes Mary Ellen Law NP   levothyroxine (SYNTHROID) 25 mcg tablet TAKE 1 TABLET BY MOUTH DAILY BEFORE BREAKFAST 10/24/21  Yes Mary Ellen Law NP   amLODIPine (NORVASC) 10 mg tablet TAKE 1 TABLET BY MOUTH DAILY 10/24/21  Yes Mary Ellen Law NP   cholecalciferol (VITAMIN D3) 1,000 unit cap Take  by mouth daily. Yes Provider, Historical     Patient Active Problem List   Diagnosis Code    Obesity, morbid ([facility] E66.01    Hypertension I10    Acquired hypothyroidism E03.9    Vitamin D deficiency E55.9    Pre-diabetes R73.03    Elevated LDL cholesterol level E78.00     Past Surgical History:   Procedure Laterality Date    HX ORTHOPAEDIC      Hand Surgery     Family History   Problem Relation Age of Onset    Hypertension Mother 48    Diabetes Father     Hypertension Father 36     Social History     Tobacco Use    Smoking status: Never Smoker    Smokeless tobacco: Never Used   Substance Use Topics    Alcohol use: Yes     Comment: Occaisionally       ROS  As stated in HPI, otherwise all others negative.      Objective:     Patient-Reported Vitals 1/26/2022   Patient-Reported Systolic  411   Patient-Reported Diastolic 79      General: alert, cooperative, no distress   Mental  status: normal mood, behavior, speech, dress, motor activity, and thought processes, able to follow commands   HENT: NCAT   Neck: no visualized mass   Resp: no respiratory distress   Neuro: no gross deficits   Skin: no discoloration or lesions of concern on visible areas   Psychiatric: normal affect, consistent with stated mood, no evidence of hallucinations     Additional exam findings: We discussed the expected course, resolution and complications of the diagnosis(es) in detail. Medication risks, benefits, costs, interactions, and alternatives were discussed as indicated. I advised him to contact the office if his condition worsens, changes or fails to improve as anticipated. He expressed understanding with the diagnosis(es) and plan. Reji Palmer, was evaluated through a synchronous (real-time) audio-video encounter. The patient (or guardian if applicable) is aware that this is a billable service, which includes applicable co-pays. Verbal consent to proceed has been obtained. The visit was conducted pursuant to the emergency declaration under the [facility], [address] [place] [address] authority and the [employer] and [employer] General Act. Patient identification was verified, and a caregiver was present when appropriate. The patient was located at home in a state where the provider was licensed to provide care. ROBERTO [place] [id number] [facility] [address] [place] [address] Ramirez.   Frankie Pickering

## 2022-03-18 PROBLEM — I10 HYPERTENSION: Status: ACTIVE | Noted: 2018-02-01

## 2022-03-19 PROBLEM — E66.01 OBESITY, MORBID (HCC): Status: ACTIVE | Noted: 2018-02-01

## 2022-03-19 PROBLEM — E03.9 ACQUIRED HYPOTHYROIDISM: Status: ACTIVE | Noted: 2018-08-08

## 2022-03-19 PROBLEM — R73.03 PRE-DIABETES: Status: ACTIVE | Noted: 2020-12-18

## 2022-03-20 PROBLEM — E55.9 VITAMIN D DEFICIENCY: Status: ACTIVE | Noted: 2018-11-08

## 2022-03-20 PROBLEM — E78.00 ELEVATED LDL CHOLESTEROL LEVEL: Status: ACTIVE | Noted: 2020-12-18

## 2022-05-03 DIAGNOSIS — I10 ESSENTIAL HYPERTENSION: ICD-10-CM

## 2022-05-03 DIAGNOSIS — E03.9 ACQUIRED HYPOTHYROIDISM: ICD-10-CM

## 2022-05-03 RX ORDER — AMLODIPINE BESYLATE 10 MG/1
10 TABLET ORAL DAILY
Qty: 90 TABLET | Refills: 1 | OUTPATIENT
Start: 2022-05-03

## 2022-05-03 RX ORDER — LEVOTHYROXINE SODIUM 25 UG/1
25 TABLET ORAL
Qty: 90 TABLET | Refills: 1 | OUTPATIENT
Start: 2022-05-03

## 2022-05-03 RX ORDER — LISINOPRIL AND HYDROCHLOROTHIAZIDE 20; 25 MG/1; MG/1
1 TABLET ORAL DAILY
Qty: 90 TABLET | Refills: 1 | OUTPATIENT
Start: 2022-05-03

## 2022-05-03 NOTE — TELEPHONE ENCOUNTER
Gabby Conn [facility]  Subject: Medication Problem     QUESTIONS   Name of Medication? levothyroxine (SYNTHROID) 25 mcg tablet   Patient-reported dosage and instructions? 25 mcg tablet 1 tablet daily   What question or problem do you have with the medication? Last visit   Alice eMrcado 1/26/2022 stated he did not need to in 6 months. This   applies to all medications including amLODIPine (NORVASC) 10 mg tablet;   lisinopril-hydroCHLOROthiazide (PRINZIDE, ZESTORETIC) 20-25 mg   Preferred Pharmacy? [id number] - [address] [facility], [address] [facility] [address] phone number (if available)? [phone]   Additional Information for Provider? This applies to all medications   including amLODIPine (NORVASC) 10 mg tablet;   lisinopril-hydroCHLOROthiazide (PRINZIDE, ZESTORETIC) 20-25 mg   ---------------------------------------------------------------------------   --------------   CALL BACK INFO   What is the best way for the office to contact you? OK to leave message on   voicemail   Preferred Call Back Phone Number? [phone]   ---------------------------------------------------------------------------   --------------   SCRIPT ANSWERS   Relationship to Patient?  Self

## 2022-05-25 DIAGNOSIS — E03.9 ACQUIRED HYPOTHYROIDISM: ICD-10-CM

## 2022-05-25 DIAGNOSIS — I10 ESSENTIAL HYPERTENSION: ICD-10-CM

## 2022-05-25 RX ORDER — AMLODIPINE BESYLATE 10 MG/1
10 TABLET ORAL DAILY
Qty: 90 TABLET | Refills: 1 | Status: SHIPPED | OUTPATIENT
Start: 2022-05-25

## 2022-05-25 RX ORDER — LEVOTHYROXINE SODIUM 25 UG/1
25 TABLET ORAL
Qty: 90 TABLET | Refills: 1 | Status: SHIPPED | OUTPATIENT
Start: 2022-05-25

## 2022-05-25 RX ORDER — LISINOPRIL AND HYDROCHLOROTHIAZIDE 20; 25 MG/1; MG/1
1 TABLET ORAL DAILY
Qty: 90 TABLET | Refills: 1 | Status: SHIPPED | OUTPATIENT
Start: 2022-05-25

## 2022-05-25 NOTE — TELEPHONE ENCOUNTER
[facility]es [facility]  Subject: Refill Request     QUESTIONS   Name of Medication? lisinopril-hydroCHLOROthiazide (PRINZIDE, ZESTORETIC)   20-25 mg per tablet   Patient-reported dosage and instructions? 1 a day   How many days do you have left? 7   Preferred [facility]acy? [id number]   Pharmacy phone number (if available)? [phone]   ---------------------------------------------------------------------------   --------------,   Name of Medication? levothyroxine (SYNTHROID) 25 mcg tablet   Patient-reported dosage and instructions? 1 a day   How many days do you have left? 7   [facility]? [id number]   Pharmacy phone number (if available)? [phone]   ---------------------------------------------------------------------------   --------------,   Name of Medication? amLODIPine (NORVASC) 10 mg tablet   Patient-reported dosage and instructions? 1 a day   How many days do you have left? 7   [facility]? [id number]   Pharmacy phone number (if available)? [phone]   ---------------------------------------------------------------------------   --------------   CALL BACK INFO   What is the best way for the office to contact you? OK to leave message on   voicemail   Preferred Call Back Phone Number? [phone]   ---------------------------------------------------------------------------   --------------   SCRIPT ANSWERS   Relationship to Patient?  Self

## 2022-06-17 ENCOUNTER — OFFICE VISIT (OUTPATIENT)
Dept: FAMILY MEDICINE CLINIC | Age: 37
End: 2022-06-17
Payer: COMMERCIAL

## 2022-06-17 ENCOUNTER — HOSPITAL ENCOUNTER (OUTPATIENT)
Dept: LAB | Age: 37
Discharge: HOME | End: 2022-06-17
Payer: COMMERCIAL

## 2022-06-17 VITALS
TEMPERATURE: 98 F | SYSTOLIC BLOOD PRESSURE: 110 MMHG | HEART RATE: 66 BPM | HEIGHT: 68 IN | WEIGHT: 305.8 LBS | RESPIRATION RATE: 18 BRPM | BODY MASS INDEX: 46.35 KG/M2 | DIASTOLIC BLOOD PRESSURE: 75 MMHG | OXYGEN SATURATION: 97 %

## 2022-06-17 DIAGNOSIS — I10 PRIMARY HYPERTENSION: Primary | ICD-10-CM

## 2022-06-17 DIAGNOSIS — E78.00 ELEVATED LDL CHOLESTEROL LEVEL: ICD-10-CM

## 2022-06-17 DIAGNOSIS — I10 PRIMARY HYPERTENSION: ICD-10-CM

## 2022-06-17 DIAGNOSIS — E55.9 VITAMIN D DEFICIENCY: ICD-10-CM

## 2022-06-17 DIAGNOSIS — E03.9 ACQUIRED HYPOTHYROIDISM: ICD-10-CM

## 2022-06-17 DIAGNOSIS — R73.03 PRE-DIABETES: ICD-10-CM

## 2022-06-17 LAB
25(OH)D3 SERPL-MCNC: 34.5 NG/ML (ref 30–100)
ALBUMIN SERPL-MCNC: 4.1 G/DL (ref 3.4–5)
ALBUMIN/GLOB SERPL: 1.2 {RATIO} (ref 0.8–1.7)
ALP SERPL-CCNC: 93 U/L (ref 45–117)
ALT SERPL-CCNC: 24 U/L (ref 16–61)
ANION GAP SERPL CALC-SCNC: 9 MMOL/L (ref 3–18)
AST SERPL-CCNC: 15 U/L (ref 10–38)
BILIRUB SERPL-MCNC: 0.6 MG/DL (ref 0.2–1)
BUN SERPL-MCNC: 14 MG/DL (ref 7–18)
BUN/CREAT SERPL: 14 (ref 12–20)
CALCIUM SERPL-MCNC: 9.5 MG/DL (ref 8.5–10.1)
CHLORIDE SERPL-SCNC: 103 MMOL/L (ref 100–111)
CHOLEST SERPL-MCNC: 161 MG/DL
CO2 SERPL-SCNC: 26 MMOL/L (ref 21–32)
CREAT SERPL-MCNC: 1.03 MG/DL (ref 0.6–1.3)
GLOBULIN SER CALC-MCNC: 3.5 G/DL (ref 2–4)
GLUCOSE SERPL-MCNC: 98 MG/DL (ref 74–99)
HDLC SERPL-MCNC: 42 MG/DL (ref 40–60)
HDLC SERPL: 3.8 {RATIO} (ref 0–5)
LDLC SERPL CALC-MCNC: 105.2 MG/DL (ref 0–100)
LIPID PROFILE,FLP: ABNORMAL
POTASSIUM SERPL-SCNC: 4 MMOL/L (ref 3.5–5.5)
PROT SERPL-MCNC: 7.6 G/DL (ref 6.4–8.2)
SODIUM SERPL-SCNC: 138 MMOL/L (ref 136–145)
T4 FREE SERPL-MCNC: 1.2 NG/DL (ref 0.7–1.5)
TRIGL SERPL-MCNC: 69 MG/DL (ref ?–150)
TSH SERPL DL<=0.05 MIU/L-ACNC: 1.48 UIU/ML (ref 0.36–3.74)
VLDLC SERPL CALC-MCNC: 13.8 MG/DL

## 2022-06-17 PROCEDURE — 84439 ASSAY OF FREE THYROXINE: CPT

## 2022-06-17 PROCEDURE — 80053 COMPREHEN METABOLIC PANEL: CPT

## 2022-06-17 PROCEDURE — 82306 VITAMIN D 25 HYDROXY: CPT

## 2022-06-17 PROCEDURE — 99214 OFFICE O/P EST MOD 30 MIN: CPT | Performed by: NURSE PRACTITIONER

## 2022-06-17 PROCEDURE — 80061 LIPID PANEL: CPT

## 2022-06-17 PROCEDURE — 84443 ASSAY THYROID STIM HORMONE: CPT

## 2022-06-17 NOTE — PROGRESS NOTES
Room 7    When asked if patient has any concerns he would like to address with MARTA Kam patient states  no . Did patient bring someone? No    Did the patient have DME equipment? No     Did you take your medication today? Yes       1. \"Have you been to the ER, urgent care clinic since your last visit? Hospitalized since your last visit? \" No    2. \"Have you seen or consulted any other health care providers outside of the [address] [place] [address] since your last visit? \" No     3. For patients aged 39-70: Has the patient had a colonoscopy / FIT/ Cologuard? NA - based on age      If the patient is female:    4. For patients aged 41-77: Has the patient had a mammogram within the past 2 years? NA - based on age or sex      11. For patients aged 21-65: Has the patient had a pap smear?  NA - based on age or sex        3 most recent PHQ Screens 6/17/2022   Little interest or pleasure in doing things Not at all   Feeling down, depressed, irritable, or hopeless Not at all   Total Score PHQ 2 0       Health Maintenance Due   Topic Date Due    Hepatitis C Screening  Never done    DTaP/Tdap/Td series (1 - Tdap) Never done    COVID-19 Vaccine (3 - Booster for [employer] series) 04/01/2022       Learning Assessment 2/1/2018   PRIMARY LEARNER Patient   HIGHEST LEVEL OF EDUCATION - PRIMARY LEARNER  SOME COLLEGE   BARRIERS PRIMARY LEARNER NONE   CO-LEARNER CAREGIVER No   PRIMARY LANGUAGE ENGLISH   LEARNER PREFERENCE PRIMARY READING   ANSWERED BY Kleber CORONA SELF

## 2022-06-17 NOTE — PROGRESS NOTES
Deon               [address]               [phone]      Paty Weaver is a 39 y.o. male and presents with Follow Up Chronic Condition, Hypertension, and Hypothyroidism       Assessment/Plan:    Diagnoses and all orders for this visit:    1. Primary hypertension  -     METABOLIC PANEL, COMPREHENSIVE; Future  -     METABOLIC PANEL, COMPREHENSIVE; Future  Endorses medication compliance, Follow up labs prior to next visit, Follow-up labs today and Blood pressure stable, continue prinzide and amlodipine at same dose   2. Acquired hypothyroidism  -     TSH 3RD GENERATION; Future  -     T4, FREE; Future  -     TSH 3RD GENERATION; Future  -     T4, FREE; Future  Endorses medication compliance, Follow up labs prior to next visit, Follow-up labs today and Denies signs and symptoms of hyper or hypothyroidism   3. Elevated LDL cholesterol level  -     LIPID PANEL; Future  Managed with diet and exercise and Follow-up labs today   4. Pre-diabetes  -     AMB POC HEMOGLOBIN A1C  Follow-up labs today   5. Vitamin D deficiency  -     VITAMIN D, 25 HYDROXY; Future  Follow-up labs today     Labs today and prior  Follow-up and Dispositions    · Return in about 6 months (around 12/17/2022) for CPE, HTN, hypothyroid, 30 min, office only, with, labs prior. Health Maintenance:   Health Maintenance   Topic Date Due    Hepatitis C Screening  Never done    DTaP/Tdap/Td series (1 - Tdap) Never done    COVID-19 Vaccine (3 - Booster for Pfizer series) 04/01/2022    A1C test (Diabetic or Prediabetic)  07/26/2022    Flu Vaccine (Season Ended) 09/01/2022    Depression Screen  01/26/2023    Pneumococcal 0-64 years  Aged Out        Subjective:    Labs obtained prior to visit? NO  Reviewed with patient?  Not applicable    Hypertension:  Visit Vitals  /75 (BP 1 Location: Left arm, BP Patient Position: Sitting, BP Cuff Size: Adult) Comment (BP Patient Position): feet flat on floor   Pulse 66 Temp 98 °F (36.7 °C) (Temporal)   Resp 18   Ht 5' 8\" (1.727 m)   Wt 305 lb 12.8 oz (138.7 kg)   SpO2 97%   BMI 46.50 kg/m²      Patient reports taking medications as instructed. yes   Medication side effects noted. no  Headache upon wakening. no   Home BP monitorin/71  Do you experience chest pain/pressure or SOB with exertion? no  Maintain a low Sodium diet? yes  Key CAD CHF Meds             lisinopril-hydroCHLOROthiazide (PRINZIDE, ZESTORETIC) 20-25 mg per tablet (Taking) Take 1 Tablet by mouth daily. amLODIPine (NORVASC) 10 mg tablet (Taking) Take 1 Tablet by mouth daily. BUN   Date Value Ref Range Status   2022 15 7.0 - 18 MG/DL Final     Creatinine   Date Value Ref Range Status   2022 0.95 0.6 - 1.3 MG/DL Final     GFR est AA   Date Value Ref Range Status   2022 >60 >60 ml/min/1.73m2 Final     Potassium   Date Value Ref Range Status   2022 4.4 3.5 - 5.5 mmol/L Final       Thyroid Review:  Patient is seen for followup of hypothyroidism. Taking medications daily either 30 minutes prior to or 2 hours after eating? YES  Thyroid ROS: denies fatigue, weight changes, heat/cold intolerance, constipation, diarrhea, fatigue, weakness, edema, paresthesia, irregular menses, hair loss, vocal changes, CVS symptoms.   TSH   Date Value Ref Range Status   2022 1.17 0.36 - 3.74 uIU/mL Final   2021 1.37 0.36 - 3.74 uIU/mL Final   2021 1.85 0.40 - 4.50 mIU/L Final   2019 1.26 0.36 - 3.74 uIU/mL Final   2019 1.00 0.36 - 3.74 uIU/mL Final     T3, total   Date Value Ref Range Status   2018 87 71 - 180 ng/dL Final     Comment:     (NOTE)  Performed At: [facility] & [address] [id number]  Lizzette Mckeon MD HB:[id number]       T4, Free   Date Value Ref Range Status   2022 1.1 0.7 - 1.5 NG/DL Final   2021 1.2 0.7 - 1.5 NG/DL Final   2019 1.2 0.7 - 1.5 NG/DL Final   2018 1.2 0.7 - 1.5 NG/DL Final 05/08/2018 1.3 0.7 - 1.5 NG/DL Final       ROS:     ROS  As stated in HPI, otherwise all others negative. The problem list was updated as a part of today's visit. Patient Active Problem List   Diagnosis Code    Obesity, morbid ([facility] [address] E66.01    Primary hypertension I10    Acquired hypothyroidism E03.9    Vitamin D deficiency E55.9    Pre-diabetes R73.03    Elevated LDL cholesterol level E78.00       The PSH, FH were reviewed. SH:  Social History     Tobacco Use    Smoking status: Never Smoker    Smokeless tobacco: Never Used   Vaping Use    Vaping Use: Never used   Substance Use Topics    Alcohol use: Yes     Comment: Occaisionally    Drug use: No       Medications/Allergies:  Current Outpatient Medications on File Prior to Visit   Medication Sig Dispense Refill    lisinopril-hydroCHLOROthiazide (PRINZIDE, ZESTORETIC) 20-25 mg per tablet Take 1 Tablet by mouth daily. 90 Tablet 1    levothyroxine (SYNTHROID) 25 mcg tablet Take 1 Tablet by mouth Daily (before breakfast). 90 Tablet 1    amLODIPine (NORVASC) 10 mg tablet Take 1 Tablet by mouth daily. 90 Tablet 1    cholecalciferol (VITAMIN D3) 1,000 unit cap Take  by mouth daily. No current facility-administered medications on file prior to visit. No Known Allergies    Objective:  Visit Vitals  /75 (BP 1 Location: Left arm, BP Patient Position: Sitting, BP Cuff Size: Adult) Comment (BP Patient Position): feet flat on floor   Pulse 66   Temp 98 °F (36.7 °C) (Temporal)   Resp 18   Ht 5' 8\" (1.727 m)   Wt 305 lb 12.8 oz (138.7 kg)   SpO2 97%   BMI 46.50 kg/m²    Body mass index is 46.5 kg/m². Physical assessment  Physical Exam  Vitals and nursing note reviewed. Eyes:      Conjunctiva/sclera: Conjunctivae normal.      Pupils: Pupils are equal, round, and reactive to light. Cardiovascular:      Rate and Rhythm: Normal rate and regular rhythm. Heart sounds: Normal heart sounds. No murmur heard. No friction rub.  No gallop. Pulmonary:      Effort: Pulmonary effort is normal.      Breath sounds: Normal breath sounds. Musculoskeletal:         General: Normal range of motion. Cervical back: Normal range of motion. Skin:     General: Skin is warm and dry. Neurological:      Mental Status: He is alert. Labwork and Ancillary Studies:    CBC w/Diff  Lab Results   Component Value Date/Time    WBC 10.1 08/23/2019 09:16 AM    HGB 14.9 08/23/2019 09:16 AM    PLATELET 614 56/34/3361 09:16 AM         Basic Metabolic Profile  Lab Results   Component Value Date/Time    Sodium 137 01/19/2022 08:59 AM    Potassium 4.4 01/19/2022 08:59 AM    Chloride 104 01/19/2022 08:59 AM    CO2 30 01/19/2022 08:59 AM    Anion gap 3 01/19/2022 08:59 AM    Glucose 103 (H) 01/19/2022 08:59 AM    BUN 15 01/19/2022 08:59 AM    Creatinine 0.95 01/19/2022 08:59 AM    BUN/Creatinine ratio 16 01/19/2022 08:59 AM    GFR est AA >60 01/19/2022 08:59 AM    GFR est non-AA >60 01/19/2022 08:59 AM    Calcium 9.6 01/19/2022 08:59 AM        Cholesterol  Lab Results   Component Value Date/Time    Cholesterol, total 159 01/19/2022 08:59 AM    HDL Cholesterol 42 01/19/2022 08:59 AM    LDL-CHOLESTEROL 129 (H) 01/28/2021 02:35 PM    LDL, calculated 101.2 (H) 01/19/2022 08:59 AM    Triglyceride 79 01/19/2022 08:59 AM    CHOL/HDL Ratio 3.8 01/19/2022 08:59 AM    Cholesterol/HDL ratio 5.1 (H) 01/28/2021 02:35 PM           I have discussed the diagnosis with the patient and the intended plan as seen in the above orders. The patient has received an After-Visit Summary and questions were answered concerning future plans. An After Visit Summary was printed and given to the patient. All diagnosis have been discussed with the patient and all of the patient's questions have been answered. Follow-up and Dispositions    · Return in about 6 months (around 12/17/2022) for CPE, HTN, hypothyroid, 30 min, office only, with, labs prior.            Jarett Figueroa, AGNP-BC  [address] [facility]   [address] [facility] [address]  [address]

## 2022-11-05 DIAGNOSIS — E03.9 ACQUIRED HYPOTHYROIDISM: ICD-10-CM

## 2022-11-05 DIAGNOSIS — I10 ESSENTIAL HYPERTENSION: ICD-10-CM

## 2022-11-07 RX ORDER — LEVOTHYROXINE SODIUM 25 UG/1
TABLET ORAL
Qty: 90 TABLET | Refills: 1 | Status: SHIPPED | OUTPATIENT
Start: 2022-11-07

## 2022-11-07 RX ORDER — AMLODIPINE BESYLATE 10 MG/1
10 TABLET ORAL DAILY
Qty: 90 TABLET | Refills: 1 | Status: SHIPPED | OUTPATIENT
Start: 2022-11-07

## 2022-11-07 RX ORDER — LISINOPRIL AND HYDROCHLOROTHIAZIDE 20; 25 MG/1; MG/1
1 TABLET ORAL DAILY
Qty: 90 TABLET | Refills: 1 | Status: SHIPPED | OUTPATIENT
Start: 2022-11-07

## 2023-02-06 DIAGNOSIS — E03.9 ACQUIRED HYPOTHYROIDISM: ICD-10-CM

## 2023-02-06 DIAGNOSIS — I10 ESSENTIAL HYPERTENSION: ICD-10-CM

## 2023-02-06 RX ORDER — LISINOPRIL AND HYDROCHLOROTHIAZIDE 20; 25 MG/1; MG/1
1 TABLET ORAL DAILY
Qty: 90 TABLET | Refills: 1 | Status: SHIPPED | OUTPATIENT
Start: 2023-02-06

## 2023-02-06 RX ORDER — LEVOTHYROXINE SODIUM 25 UG/1
25 TABLET ORAL
Qty: 90 TABLET | Refills: 1 | Status: SHIPPED | OUTPATIENT
Start: 2023-02-06

## 2023-02-06 RX ORDER — AMLODIPINE BESYLATE 10 MG/1
10 TABLET ORAL DAILY
Qty: 90 TABLET | Refills: 1 | Status: SHIPPED | OUTPATIENT
Start: 2023-02-06

## 2023-02-06 NOTE — TELEPHONE ENCOUNTER
3/10/2023  3:15 PM OFFICE VISIT Milad 97, APRN - CNP
Received: Today  [facility] Nurses Pool  Subject: Refill Request     QUESTIONS   Name of Medication? amLODIPine (NORVASC) 10 MG tablet   Patient-reported dosage and instructions? once a day   How many days do you have left? 14   [id number]   Pharmacy phone number (if available)? [phone]   ---------------------------------------------------------------------------   --------------,   Name of Medication? levothyroxine (SYNTHROID) 25 MCG tablet   Patient-reported dosage and instructions? once a day   How many days do you have left? 14   Preferred Pharmacy? [place] [address] #[id number]   Pharmacy phone number (if available)? [phone]   ---------------------------------------------------------------------------   --------------,   Name of Medication? lisinopril-hydroCHLOROthiazide (PRINZIDE;ZESTORETIC)   20-25 MG per tablet   Patient-reported dosage and instructions? once a day   How many days do you have left? 14   Preferred Pharmacy? [place] [address] #[id number]   Pharmacy phone number (if available)? [phone]   ---------------------------------------------------------------------------   --------------   CALL BACK INFO   What is the best way for the office to contact you? OK to leave message on   voicemail   Preferred Call Back Phone Number? [phone]   ---------------------------------------------------------------------------   --------------   SCRIPT ANSWERS   Relationship to Patient?  Self
show

## 2023-02-28 ENCOUNTER — TELEPHONE (OUTPATIENT)
Facility: CLINIC | Age: 38
End: 2023-02-28

## 2023-03-02 ENCOUNTER — HOSPITAL ENCOUNTER (OUTPATIENT)
Facility: HOSPITAL | Age: 38
Setting detail: SPECIMEN
Discharge: HOME | End: 2023-03-02
Payer: COMMERCIAL

## 2023-03-02 DIAGNOSIS — R73.03 PRE-DIABETES: ICD-10-CM

## 2023-03-02 DIAGNOSIS — E03.9 ACQUIRED HYPOTHYROIDISM: Primary | ICD-10-CM

## 2023-03-02 DIAGNOSIS — I10 PRIMARY HYPERTENSION: ICD-10-CM

## 2023-03-02 LAB
ALBUMIN SERPL-MCNC: 4.4 G/DL (ref 3.4–5)
ALBUMIN/GLOB SERPL: 1.2 (ref 0.8–1.7)
ALP SERPL-CCNC: 104 U/L (ref 45–117)
ALT SERPL-CCNC: 31 U/L (ref 16–61)
ANION GAP SERPL CALC-SCNC: 6 MMOL/L (ref 3–18)
AST SERPL-CCNC: 16 U/L (ref 10–38)
BILIRUB SERPL-MCNC: 0.8 MG/DL (ref 0.2–1)
BUN SERPL-MCNC: 14 MG/DL (ref 7–18)
BUN/CREAT SERPL: 14 (ref 12–20)
CALCIUM SERPL-MCNC: 9.5 MG/DL (ref 8.5–10.1)
CHLORIDE SERPL-SCNC: 103 MMOL/L (ref 100–111)
CO2 SERPL-SCNC: 26 MMOL/L (ref 21–32)
CREAT SERPL-MCNC: 1.02 MG/DL (ref 0.6–1.3)
EST. AVERAGE GLUCOSE BLD GHB EST-MCNC: 117 MG/DL
GLOBULIN SER CALC-MCNC: 3.6 G/DL (ref 2–4)
GLUCOSE SERPL-MCNC: 106 MG/DL (ref 74–99)
HBA1C MFR BLD: 5.7 % (ref 4.2–5.6)
POTASSIUM SERPL-SCNC: 4.4 MMOL/L (ref 3.5–5.5)
PROT SERPL-MCNC: 8 G/DL (ref 6.4–8.2)
SODIUM SERPL-SCNC: 135 MMOL/L (ref 136–145)
TSH SERPL DL<=0.05 MIU/L-ACNC: 1.55 UIU/ML (ref 0.36–3.74)

## 2023-03-02 PROCEDURE — 80053 COMPREHEN METABOLIC PANEL: CPT

## 2023-03-02 PROCEDURE — 36415 COLL VENOUS BLD VENIPUNCTURE: CPT

## 2023-03-02 PROCEDURE — 83036 HEMOGLOBIN GLYCOSYLATED A1C: CPT

## 2023-03-02 PROCEDURE — 84443 ASSAY THYROID STIM HORMONE: CPT

## 2023-03-10 ENCOUNTER — TELEMEDICINE (OUTPATIENT)
Facility: CLINIC | Age: 38
End: 2023-03-10

## 2023-03-10 DIAGNOSIS — E55.9 VITAMIN D DEFICIENCY: ICD-10-CM

## 2023-03-10 DIAGNOSIS — E03.9 ACQUIRED HYPOTHYROIDISM: ICD-10-CM

## 2023-03-10 DIAGNOSIS — I10 PRIMARY HYPERTENSION: Primary | ICD-10-CM

## 2023-03-10 DIAGNOSIS — K21.9 GASTROESOPHAGEAL REFLUX DISEASE WITHOUT ESOPHAGITIS: ICD-10-CM

## 2023-03-10 PROBLEM — E66.01 OBESITY, MORBID (HCC): Status: ACTIVE | Noted: 2018-02-01

## 2023-03-10 SDOH — ECONOMIC STABILITY: FOOD INSECURITY: WITHIN THE PAST 12 MONTHS, THE FOOD YOU BOUGHT JUST DIDN'T LAST AND YOU DIDN'T HAVE MONEY TO GET MORE.: NEVER TRUE

## 2023-03-10 SDOH — ECONOMIC STABILITY: TRANSPORTATION INSECURITY
IN THE PAST 12 MONTHS, HAS LACK OF TRANSPORTATION KEPT YOU FROM MEETINGS, WORK, OR FROM GETTING THINGS NEEDED FOR DAILY LIVING?: NO

## 2023-03-10 SDOH — ECONOMIC STABILITY: FOOD INSECURITY: WITHIN THE PAST 12 MONTHS, YOU WORRIED THAT YOUR FOOD WOULD RUN OUT BEFORE YOU GOT MONEY TO BUY MORE.: NEVER TRUE

## 2023-03-10 SDOH — ECONOMIC STABILITY: HOUSING INSECURITY
IN THE LAST 12 MONTHS, WAS THERE A TIME WHEN YOU DID NOT HAVE A STEADY PLACE TO SLEEP OR SLEPT IN A SHELTER (INCLUDING NOW)?: NO

## 2023-03-10 SDOH — ECONOMIC STABILITY: INCOME INSECURITY: IN THE LAST 12 MONTHS, WAS THERE A TIME WHEN YOU WERE NOT ABLE TO PAY THE MORTGAGE OR RENT ON TIME?: NO

## 2023-03-10 SDOH — ECONOMIC STABILITY: TRANSPORTATION INSECURITY
IN THE PAST 12 MONTHS, HAS THE LACK OF TRANSPORTATION KEPT YOU FROM MEDICAL APPOINTMENTS OR FROM GETTING MEDICATIONS?: NO

## 2023-03-10 ASSESSMENT — SOCIAL DETERMINANTS OF HEALTH (SDOH): HOW HARD IS IT FOR YOU TO PAY FOR THE VERY BASICS LIKE FOOD, HOUSING, MEDICAL CARE, AND HEATING?: NOT HARD AT ALL

## 2023-03-10 NOTE — PROGRESS NOTES
When asked if patient has any concerns he would like to address with ALEXIS Jama patient states no . Did patient bring someone? No    Did the patient have DME equipment? No    Did you take your medication today? Yes       1. \"Have you been to the ER, urgent care clinic since your last visit? Hospitalized since your last visit? \" Patient states I went to [facility] on 03/08/23 due to severe back pain and discomfort in chest .     2. \"Have you seen or consulted any other health care providers outside of the [address] [place] [address] since your last visit? \" No     3. For patients aged 39-70: Has the patient had a colonoscopy / FIT/ Cologuard? N/A      If the patient is female:    4. For patients aged 41-77: Has the patient had a mammogram within the past 2 years? N/A      5. For patients aged 21-65: Has the patient had a pap smear? {Cancer Care Gap present? N/A      PHQ-9  6/17/2022   Little interest or pleasure in doing things 0   Little interest or pleasure in doing things -   Feeling down, depressed, or hopeless 0   PHQ-2 Score 0   Total Score PHQ 2 -   PHQ-9 Total Score 0          Health Maintenance Due   Topic Date Due    Varicella vaccine (1 of 2 - 2-dose childhood series) Never done    HIV screen  Never done    Hepatitis C screen  Never done    DTaP/Tdap/Td vaccine (1 - Tdap) Never done    COVID-19 Vaccine (3 - Booster for Pfizer series) 12/27/2021    Flu vaccine (1) Never done         Who is the primary learner? Patient    What is the preferred language for health care of the primary learner? ENGLISH    How does the primary learner prefer to learn new concepts?  DEMONSTRATION    Answered By Patient    Relationship to Learner SELF

## 2023-03-10 NOTE — PROGRESS NOTES
Erin Rivera (:  1985) is a Established patient, here for evaluation of the following:    Assessment & Plan   Below is the assessment and plan developed based on review of pertinent history, physical exam, labs, studies, and medications. 1. Primary hypertension  -     Comprehensive Metabolic Panel; Future  -     Lipid Panel; Future  Endorses medication compliance, Follow up labs prior to next visit, and Blood pressure stable per home checks, continue amlodipine at same dose  2. Acquired hypothyroidism  -     TSH; Future  Endorses medication compliance, Follow up labs prior to next visit, and Denies signs and symptoms of hyper or hypothyroidism  3. Gastroesophageal reflux disease without esophagitis  New diagnosis, went to ED for chest pain and it was determined to be reflux, he has made dietary changes with a good response  4. Vitamin D deficiency  -     Vitamin D 25 Hydroxy; Future  Follow up labs prior to next visit    Return in about 4 months (around 7/10/2023) for CPE, HTN, hypothyroid, 30min, office, w/labsprior. Subjective   HPI    Hypertension:  There were no vitals taken for this visit. Patient reports taking medications as instructed. Yes   Medication side effects noted no  Headache upon wakening. no   Home BP monitoring: yes, 123/76, 112/73, 119/78, 129/79  Do you experience chest pain/pressure or SOB with exertion? no  Maintain a low Sodium diet? Yes  Lab Results   Component Value Date/Time    BUN 14 2023 02:25 PM    CREATININE 1.02 2023 02:25 PM    LABGLOM >60 2023 02:25 PM    K 4.4 2023 02:25 PM     Key Anti-Hypertensive Meds            amLODIPine (NORVASC) 10 MG tablet (Taking)    Class: Historical Med    lisinopril-hydroCHLOROthiazide (PRINZIDE;ZESTORETIC) 20-25 MG per tablet (Taking)    Class: Historical Med           Endocrine Review  Patient is seen for followup of hypothyroidism. Since last visit: no changes.   He reports medication compliance: all the time, but is NOT taking separate from his other meds. He reports the following concerns/problems/med side effects: none. Lab review: labs are reviewed, up to date and normal, labs reviewed and discussed with patient. Lab Results   Component Value Date/Time    TSH 1.48 06/17/2022 09:10 AM   ]    ED visit  Was having chest pains  Found to have GERD  Advised to make dietary changes  Has not had any problems since making the dietary changes    Review of Systems   As stated in HPI, otherwise all others negative.        Objective   Patient-Reported Vitals  No data recorded     Physical Exam  [INSTRUCTIONS:  \"[x]\" Indicates a positive item  \"[]\" Indicates a negative item  -- DELETE ALL ITEMS NOT EXAMINED]    Constitutional: [x] Appears well-developed and well-nourished [x] No apparent distress      [] Abnormal -     Mental status: [x] Alert and awake  [x] Oriented to person/place/time [x] Able to follow commands    [] Abnormal -     Eyes:   EOM    []  Normal    [] Abnormal -   Sclera  []  Normal    [] Abnormal -          Discharge []  None visible   [] Abnormal -     HENT: [x] Normocephalic, atraumatic  [] Abnormal -   [] Mouth/Throat: Mucous membranes are moist    External Ears [x] Normal  [] Abnormal -    Neck: [x] No visualized mass [] Abnormal -     Pulmonary/Chest: [x] Respiratory effort normal   [x] No visualized signs of difficulty breathing or respiratory distress        [] Abnormal -      Musculoskeletal:   [] Normal gait with no signs of ataxia         [] Normal range of motion of neck        [] Abnormal -     Neurological:        [x] No Facial Asymmetry (Cranial nerve 7 motor function) (limited exam due to video visit)          [x] No gaze palsy        [] Abnormal -          Skin:        [x] No significant exanthematous lesions or discoloration noted on facial skin         [] Abnormal -            Psychiatric:       [x] Normal Affect [] Abnormal -        [x] No Hallucinations    Other pertinent observable physical exam findings:-             Blayne Martines, was evaluated through a synchronous (real-time) audio-video encounter. The patient (or guardian if applicable) is aware that this is a billable service, which includes applicable co-pays. This Virtual Visit was conducted with patient's (and/or legal guardian's) consent. The visit was conducted pursuant to the emergency declaration under the [facility], [id number] waiver authority and the [employer] General Act. Patient identification was verified, and a caregiver was present when appropriate.    The patient was located at Home: [address] [place] [address]  Provider was located at [facility] ([place] [address]): [address] [facility], [place],  Angeli Hull 8141, APRN - CNP

## 2023-11-14 ENCOUNTER — HOSPITAL ENCOUNTER (OUTPATIENT)
Facility: HOSPITAL | Age: 38
Setting detail: SPECIMEN
Discharge: HOME | End: 2023-11-17
Payer: COMMERCIAL

## 2023-11-14 LAB
25(OH)D3 SERPL-MCNC: 37.6 NG/ML (ref 30–100)
ALBUMIN SERPL-MCNC: 4.2 G/DL (ref 3.4–5)
ALBUMIN/GLOB SERPL: 1.2 (ref 0.8–1.7)
ALP SERPL-CCNC: 85 U/L (ref 45–117)
ALT SERPL-CCNC: 29 U/L (ref 16–61)
ANION GAP SERPL CALC-SCNC: 6 MMOL/L (ref 3–18)
AST SERPL-CCNC: 14 U/L (ref 10–38)
BILIRUB SERPL-MCNC: 1 MG/DL (ref 0.2–1)
BUN SERPL-MCNC: 12 MG/DL (ref 7–18)
BUN/CREAT SERPL: 12 (ref 12–20)
CALCIUM SERPL-MCNC: 9.7 MG/DL (ref 8.5–10.1)
CHLORIDE SERPL-SCNC: 102 MMOL/L (ref 100–111)
CHOLEST SERPL-MCNC: 176 MG/DL
CO2 SERPL-SCNC: 27 MMOL/L (ref 21–32)
CREAT SERPL-MCNC: 1.02 MG/DL (ref 0.6–1.3)
GLOBULIN SER CALC-MCNC: 3.5 G/DL (ref 2–4)
GLUCOSE SERPL-MCNC: 94 MG/DL (ref 74–99)
HDLC SERPL-MCNC: 40 MG/DL (ref 40–60)
HDLC SERPL: 4.4 (ref 0–5)
LDLC SERPL CALC-MCNC: 110 MG/DL (ref 0–100)
LIPID PANEL: ABNORMAL
POTASSIUM SERPL-SCNC: 4.3 MMOL/L (ref 3.5–5.5)
PROT SERPL-MCNC: 7.7 G/DL (ref 6.4–8.2)
SODIUM SERPL-SCNC: 135 MMOL/L (ref 136–145)
TRIGL SERPL-MCNC: 130 MG/DL
TSH SERPL DL<=0.05 MIU/L-ACNC: 1.58 UIU/ML (ref 0.36–3.74)
VLDLC SERPL CALC-MCNC: 26 MG/DL

## 2023-11-14 PROCEDURE — 82306 VITAMIN D 25 HYDROXY: CPT

## 2023-11-14 PROCEDURE — 84443 ASSAY THYROID STIM HORMONE: CPT

## 2023-11-14 PROCEDURE — 36415 COLL VENOUS BLD VENIPUNCTURE: CPT

## 2023-11-14 PROCEDURE — 80061 LIPID PANEL: CPT

## 2023-11-14 PROCEDURE — 80053 COMPREHEN METABOLIC PANEL: CPT

## 2023-11-21 ENCOUNTER — OFFICE VISIT (OUTPATIENT)
Facility: CLINIC | Age: 38
End: 2023-11-21
Payer: COMMERCIAL

## 2023-11-21 VITALS
HEART RATE: 76 BPM | BODY MASS INDEX: 47.59 KG/M2 | RESPIRATION RATE: 22 BRPM | HEIGHT: 68 IN | SYSTOLIC BLOOD PRESSURE: 103 MMHG | DIASTOLIC BLOOD PRESSURE: 72 MMHG | WEIGHT: 314 LBS | OXYGEN SATURATION: 95 % | TEMPERATURE: 98.2 F

## 2023-11-21 DIAGNOSIS — R73.03 PRE-DIABETES: ICD-10-CM

## 2023-11-21 DIAGNOSIS — E78.00 ELEVATED LDL CHOLESTEROL LEVEL: ICD-10-CM

## 2023-11-21 DIAGNOSIS — Z00.00 ANNUAL PHYSICAL EXAM: Primary | ICD-10-CM

## 2023-11-21 DIAGNOSIS — Z11.59 NEED FOR HEPATITIS C SCREENING TEST: ICD-10-CM

## 2023-11-21 DIAGNOSIS — Z11.4 SCREENING FOR HIV (HUMAN IMMUNODEFICIENCY VIRUS): ICD-10-CM

## 2023-11-21 DIAGNOSIS — E03.9 ACQUIRED HYPOTHYROIDISM: ICD-10-CM

## 2023-11-21 DIAGNOSIS — E55.9 VITAMIN D DEFICIENCY: ICD-10-CM

## 2023-11-21 DIAGNOSIS — I10 PRIMARY HYPERTENSION: ICD-10-CM

## 2023-11-21 PROCEDURE — 99395 PREV VISIT EST AGE 18-39: CPT | Performed by: NURSE PRACTITIONER

## 2023-11-21 PROCEDURE — 3078F DIAST BP <80 MM HG: CPT | Performed by: NURSE PRACTITIONER

## 2023-11-21 PROCEDURE — 3074F SYST BP LT 130 MM HG: CPT | Performed by: NURSE PRACTITIONER

## 2023-11-21 RX ORDER — AMLODIPINE BESYLATE 10 MG/1
10 TABLET ORAL DAILY
Qty: 90 TABLET | Refills: 1 | Status: SHIPPED | OUTPATIENT
Start: 2023-11-21

## 2023-11-21 RX ORDER — LEVOTHYROXINE SODIUM 0.03 MG/1
25 TABLET ORAL
Qty: 90 TABLET | Refills: 1 | Status: SHIPPED | OUTPATIENT
Start: 2023-11-21

## 2023-11-21 RX ORDER — LISINOPRIL AND HYDROCHLOROTHIAZIDE 25; 20 MG/1; MG/1
1 TABLET ORAL DAILY
Qty: 90 TABLET | Refills: 1 | Status: SHIPPED | OUTPATIENT
Start: 2023-11-21

## 2023-11-21 SDOH — ECONOMIC STABILITY: FOOD INSECURITY: WITHIN THE PAST 12 MONTHS, YOU WORRIED THAT YOUR FOOD WOULD RUN OUT BEFORE YOU GOT MONEY TO BUY MORE.: NEVER TRUE

## 2023-11-21 SDOH — ECONOMIC STABILITY: INCOME INSECURITY: HOW HARD IS IT FOR YOU TO PAY FOR THE VERY BASICS LIKE FOOD, HOUSING, MEDICAL CARE, AND HEATING?: NOT HARD AT ALL

## 2023-11-21 SDOH — ECONOMIC STABILITY: FOOD INSECURITY: WITHIN THE PAST 12 MONTHS, THE FOOD YOU BOUGHT JUST DIDN'T LAST AND YOU DIDN'T HAVE MONEY TO GET MORE.: NEVER TRUE

## 2023-11-21 ASSESSMENT — ENCOUNTER SYMPTOMS
EYES NEGATIVE: 1
RESPIRATORY NEGATIVE: 1
GASTROINTESTINAL NEGATIVE: 1

## 2023-11-21 ASSESSMENT — ANXIETY QUESTIONNAIRES
5. BEING SO RESTLESS THAT IT IS HARD TO SIT STILL: 0
GAD7 TOTAL SCORE: 0
IF YOU CHECKED OFF ANY PROBLEMS ON THIS QUESTIONNAIRE, HOW DIFFICULT HAVE THESE PROBLEMS MADE IT FOR YOU TO DO YOUR WORK, TAKE CARE OF THINGS AT HOME, OR GET ALONG WITH OTHER PEOPLE: NOT DIFFICULT AT ALL
2. NOT BEING ABLE TO STOP OR CONTROL WORRYING: 0
3. WORRYING TOO MUCH ABOUT DIFFERENT THINGS: 0
6. BECOMING EASILY ANNOYED OR IRRITABLE: 0
7. FEELING AFRAID AS IF SOMETHING AWFUL MIGHT HAPPEN: 0
1. FEELING NERVOUS, ANXIOUS, OR ON EDGE: 0
4. TROUBLE RELAXING: 0

## 2023-11-21 ASSESSMENT — PATIENT HEALTH QUESTIONNAIRE - PHQ9
SUM OF ALL RESPONSES TO PHQ QUESTIONS 1-9: 0
SUM OF ALL RESPONSES TO PHQ QUESTIONS 1-9: 0
2. FEELING DOWN, DEPRESSED OR HOPELESS: 0
SUM OF ALL RESPONSES TO PHQ QUESTIONS 1-9: 0
SUM OF ALL RESPONSES TO PHQ9 QUESTIONS 1 & 2: 0
1. LITTLE INTEREST OR PLEASURE IN DOING THINGS: 0
SUM OF ALL RESPONSES TO PHQ QUESTIONS 1-9: 0

## 2024-05-17 DIAGNOSIS — I10 PRIMARY HYPERTENSION: ICD-10-CM

## 2024-05-17 DIAGNOSIS — E03.9 ACQUIRED HYPOTHYROIDISM: ICD-10-CM

## 2024-05-20 RX ORDER — LISINOPRIL AND HYDROCHLOROTHIAZIDE 25; 20 MG/1; MG/1
1 TABLET ORAL DAILY
Qty: 90 TABLET | Refills: 1 | Status: SHIPPED | OUTPATIENT
Start: 2024-05-20

## 2024-05-20 RX ORDER — AMLODIPINE BESYLATE 10 MG/1
10 TABLET ORAL DAILY
Qty: 90 TABLET | Refills: 1 | Status: SHIPPED | OUTPATIENT
Start: 2024-05-20

## 2024-05-20 RX ORDER — LEVOTHYROXINE SODIUM 0.03 MG/1
25 TABLET ORAL
Qty: 90 TABLET | Refills: 1 | Status: SHIPPED | OUTPATIENT
Start: 2024-05-20

## 2024-05-21 ENCOUNTER — HOSPITAL ENCOUNTER (OUTPATIENT)
Facility: HOSPITAL | Age: 39
Setting detail: SPECIMEN
Discharge: HOME | End: 2024-05-24
Payer: COMMERCIAL

## 2024-05-21 DIAGNOSIS — E78.00 ELEVATED LDL CHOLESTEROL LEVEL: ICD-10-CM

## 2024-05-21 DIAGNOSIS — E03.9 ACQUIRED HYPOTHYROIDISM: ICD-10-CM

## 2024-05-21 DIAGNOSIS — E55.9 VITAMIN D DEFICIENCY: ICD-10-CM

## 2024-05-21 DIAGNOSIS — Z11.4 SCREENING FOR HIV (HUMAN IMMUNODEFICIENCY VIRUS): ICD-10-CM

## 2024-05-21 DIAGNOSIS — Z11.59 NEED FOR HEPATITIS C SCREENING TEST: ICD-10-CM

## 2024-05-21 DIAGNOSIS — R73.03 PRE-DIABETES: ICD-10-CM

## 2024-05-21 DIAGNOSIS — I10 PRIMARY HYPERTENSION: ICD-10-CM

## 2024-05-21 LAB
25(OH)D3 SERPL-MCNC: 40.2 NG/ML (ref 30–100)
ALBUMIN SERPL-MCNC: 4.3 G/DL (ref 3.4–5)
ALBUMIN/GLOB SERPL: 1.1 (ref 0.8–1.7)
ALP SERPL-CCNC: 87 U/L (ref 45–117)
ALT SERPL-CCNC: 30 U/L (ref 16–61)
ANION GAP SERPL CALC-SCNC: 3 MMOL/L (ref 3–18)
AST SERPL-CCNC: 15 U/L (ref 10–38)
BILIRUB SERPL-MCNC: 0.9 MG/DL (ref 0.2–1)
BUN SERPL-MCNC: 18 MG/DL (ref 7–18)
BUN/CREAT SERPL: 14 (ref 12–20)
CALCIUM SERPL-MCNC: 9.6 MG/DL (ref 8.5–10.1)
CHLORIDE SERPL-SCNC: 104 MMOL/L (ref 100–111)
CHOLEST SERPL-MCNC: 166 MG/DL
CO2 SERPL-SCNC: 28 MMOL/L (ref 21–32)
CREAT SERPL-MCNC: 1.26 MG/DL (ref 0.6–1.3)
ERYTHROCYTE [DISTWIDTH] IN BLOOD BY AUTOMATED COUNT: 13.5 % (ref 11.6–14.5)
EST. AVERAGE GLUCOSE BLD GHB EST-MCNC: 120 MG/DL
GLOBULIN SER CALC-MCNC: 3.8 G/DL (ref 2–4)
GLUCOSE SERPL-MCNC: 100 MG/DL (ref 74–99)
HBA1C MFR BLD: 5.8 % (ref 4.2–5.6)
HCT VFR BLD AUTO: 49.7 % (ref 36–48)
HDLC SERPL-MCNC: 40 MG/DL (ref 40–60)
HDLC SERPL: 4.2 (ref 0–5)
HGB BLD-MCNC: 16.4 G/DL (ref 13–16)
LDLC SERPL CALC-MCNC: 106.4 MG/DL (ref 0–100)
LIPID PANEL: ABNORMAL
MCH RBC QN AUTO: 28.7 PG (ref 24–34)
MCHC RBC AUTO-ENTMCNC: 33 G/DL (ref 31–37)
MCV RBC AUTO: 86.9 FL (ref 78–100)
NRBC # BLD: 0 K/UL (ref 0–0.01)
NRBC BLD-RTO: 0 PER 100 WBC
PLATELET # BLD AUTO: 381 K/UL (ref 135–420)
PMV BLD AUTO: 10.1 FL (ref 9.2–11.8)
POTASSIUM SERPL-SCNC: 4.4 MMOL/L (ref 3.5–5.5)
PROT SERPL-MCNC: 8.1 G/DL (ref 6.4–8.2)
RBC # BLD AUTO: 5.72 M/UL (ref 4.35–5.65)
SODIUM SERPL-SCNC: 135 MMOL/L (ref 136–145)
TRIGL SERPL-MCNC: 98 MG/DL
TSH SERPL DL<=0.05 MIU/L-ACNC: 1.53 UIU/ML (ref 0.36–3.74)
VLDLC SERPL CALC-MCNC: 19.6 MG/DL
WBC # BLD AUTO: 9.5 K/UL (ref 4.6–13.2)

## 2024-05-21 PROCEDURE — 85027 COMPLETE CBC AUTOMATED: CPT

## 2024-05-21 PROCEDURE — 80053 COMPREHEN METABOLIC PANEL: CPT

## 2024-05-21 PROCEDURE — 82306 VITAMIN D 25 HYDROXY: CPT

## 2024-05-21 PROCEDURE — 36415 COLL VENOUS BLD VENIPUNCTURE: CPT

## 2024-05-21 PROCEDURE — 84443 ASSAY THYROID STIM HORMONE: CPT

## 2024-05-21 PROCEDURE — 87389 HIV-1 AG W/HIV-1&-2 AB AG IA: CPT

## 2024-05-21 PROCEDURE — 80061 LIPID PANEL: CPT

## 2024-05-21 PROCEDURE — 83036 HEMOGLOBIN GLYCOSYLATED A1C: CPT

## 2024-05-21 PROCEDURE — 86803 HEPATITIS C AB TEST: CPT

## 2024-05-22 LAB
HCV AB SERPL QL IA: NORMAL
HCV IGG SERPL QL IA: NON REACTIVE S/CO RATIO
HIV 1+2 AB+HIV1 P24 AG SERPL QL IA: NONREACTIVE
HIV 1/2 RESULT COMMENT: NORMAL

## 2024-05-29 ENCOUNTER — OFFICE VISIT (OUTPATIENT)
Facility: CLINIC | Age: 39
End: 2024-05-29
Payer: COMMERCIAL

## 2024-05-29 VITALS
DIASTOLIC BLOOD PRESSURE: 77 MMHG | OXYGEN SATURATION: 94 % | HEART RATE: 84 BPM | BODY MASS INDEX: 49.44 KG/M2 | WEIGHT: 315 LBS | RESPIRATION RATE: 18 BRPM | TEMPERATURE: 97.8 F | SYSTOLIC BLOOD PRESSURE: 114 MMHG | HEIGHT: 67 IN

## 2024-05-29 DIAGNOSIS — E03.9 ACQUIRED HYPOTHYROIDISM: ICD-10-CM

## 2024-05-29 DIAGNOSIS — I10 PRIMARY HYPERTENSION: Primary | ICD-10-CM

## 2024-05-29 DIAGNOSIS — R06.83 SNORING: ICD-10-CM

## 2024-05-29 DIAGNOSIS — G47.33 OSA (OBSTRUCTIVE SLEEP APNEA): ICD-10-CM

## 2024-05-29 PROCEDURE — 3078F DIAST BP <80 MM HG: CPT | Performed by: NURSE PRACTITIONER

## 2024-05-29 PROCEDURE — 3074F SYST BP LT 130 MM HG: CPT | Performed by: NURSE PRACTITIONER

## 2024-05-29 PROCEDURE — 99214 OFFICE O/P EST MOD 30 MIN: CPT | Performed by: NURSE PRACTITIONER

## 2024-05-29 ASSESSMENT — PATIENT HEALTH QUESTIONNAIRE - PHQ9
SUM OF ALL RESPONSES TO PHQ QUESTIONS 1-9: 0
2. FEELING DOWN, DEPRESSED OR HOPELESS: NOT AT ALL
1. LITTLE INTEREST OR PLEASURE IN DOING THINGS: NOT AT ALL
SUM OF ALL RESPONSES TO PHQ QUESTIONS 1-9: 0
SUM OF ALL RESPONSES TO PHQ9 QUESTIONS 1 & 2: 0

## 2024-05-29 NOTE — PROGRESS NOTES
Room 8     Gerber Jang had concerns including Follow-up Chronic Condition, Hypertension, and Hypothyroidism. for today's visit .     When asked if patient has any concerns he would like to address with ALEXIS Godoy patient states I have no concerns. ALEXIS Godoy has been notified  of patient concerns .    Did patient bring someone? NO    Did the patient have DME equipment?  No    Did you take your medication today? Patient sates yes       1. \"Have you been to the ER, urgent care clinic since your last visit?  Hospitalized since your last visit?\" Patient states I [place] [facility] [place] [address] [place] about 2 months ago due to having an allergic reaction.    2. \"Have you seen or consulted any other health care providers outside of the [facility] since your last visit?\" No     3. For patients aged 45-75: Has the patient had a colonoscopy / FIT/ Cologuard? N/A      If the patient is female:    4. For patients aged 40-74: Has the patient had a mammogram within the past 2 years? N/A      5. For patients aged 21-65: Has the patient had a pap smear? {Cancer Care Gap present? N/A          12/18/2020     2:21 PM   Amb Fall Risk Assessment and TUG Test   Fall in past 12 months? 0   Able to walk? Yes              5/29/2024     8:32 AM   PHQ-9    Little interest or pleasure in doing things 0   Feeling down, depressed, or hopeless 0   PHQ-2 Score 0   PHQ-9 Total Score 0          Health Maintenance Due   Topic Date Due    Hepatitis B vaccine (1 of 3 - 3-dose series) Never done    Varicella vaccine (1 of 2 - 2-dose childhood series) Never done    DTaP/Tdap/Td vaccine (1 - Tdap) Never done    COVID-19 Vaccine (3 - 2023-24 season) 09/01/2023

## 2024-05-29 NOTE — PROGRESS NOTES
[address] [place] [address]               [phone]      Gerber Jang is a 38 y.o. male and presents with Follow-up Chronic Condition, Hypertension, and Hypothyroidism       Assessment/Plan:    1. Primary hypertension  -     Comprehensive Metabolic Panel; Future  -     CBC; Future  2. Acquired hypothyroidism  -     Lipid Panel; Future  -     TSH; Future  3. Snoring  -     [facility] - Tracy Knowles DO, Sleep Medicine  4. JUDD (obstructive sleep apnea)  -     [facility] - Tracy Knowles DO, Sleep Medicine  Blood pressure controlled, continue prinzide and amlodipine at same dose  Thyroid function stable  Endorse snoring and signs and symptoms of JUDD, refer to sleep medicine  Labs prior to next visit  Patient verbalized understanding and is in agreement with this plan of care         Follow up and disposition:   Return in about 6 months (around 11/29/2024) for Physical, HTN, hypothyroid, 30min, office, w/labsprior.      Subjective:    Labs obtained prior to visit? Yes  Reviewed with patient? yes    Hypertension:  /77 Comment: feet flat on floor  Pulse 84   Temp 97.8 °F (36.6 °C) (Temporal)   Resp 18   Ht 1.702 m (5' 7\")   Wt (!) 146.4 kg (322 lb 12.8 oz)   SpO2 94%   BMI 50.56 kg/m²    Patient reports taking medications as instructed. Yes   Medication side effects noted no  Headache upon wakening.no   Home BP monitoring: no  Do you experience chest pain/pressure or SOB with exertion? no  Maintain a low Sodium diet? Yes  Lab Results   Component Value Date/Time    BUN 18 05/21/2024 08:30 AM    CREATININE 1.26 05/21/2024 08:30 AM    LABGLOM 75 05/21/2024 08:30 AM    LABGLOM >60 11/14/2023 09:53 AM    K 4.4 05/21/2024 08:30 AM     Hypertension Medications       Calcium Channel Blockers       amLODIPine (NORVASC) 10 MG tablet TAKE 1 TABLET BY MOUTH DAILY       Antihypertensive Combinations       lisinopril-hydroCHLOROthiazide (PRINZIDE;ZESTORETIC) 20-25 MG per tablet TAKE 1

## 2024-11-25 DIAGNOSIS — I10 PRIMARY HYPERTENSION: ICD-10-CM

## 2024-11-25 DIAGNOSIS — E03.9 ACQUIRED HYPOTHYROIDISM: ICD-10-CM

## 2024-11-26 RX ORDER — LEVOTHYROXINE SODIUM 25 UG/1
25 TABLET ORAL
Qty: 90 TABLET | Refills: 1 | Status: SHIPPED | OUTPATIENT
Start: 2024-11-26

## 2024-11-26 RX ORDER — AMLODIPINE BESYLATE 10 MG/1
10 TABLET ORAL DAILY
Qty: 90 TABLET | Refills: 1 | Status: SHIPPED | OUTPATIENT
Start: 2024-11-26

## 2024-11-26 RX ORDER — LISINOPRIL AND HYDROCHLOROTHIAZIDE 20; 25 MG/1; MG/1
1 TABLET ORAL DAILY
Qty: 90 TABLET | Refills: 1 | Status: SHIPPED | OUTPATIENT
Start: 2024-11-26

## 2024-11-27 ENCOUNTER — HOSPITAL ENCOUNTER (OUTPATIENT)
Facility: HOSPITAL | Age: 39
Setting detail: SPECIMEN
Discharge: HOME | End: 2024-11-30
Payer: COMMERCIAL

## 2024-11-27 DIAGNOSIS — E03.9 ACQUIRED HYPOTHYROIDISM: ICD-10-CM

## 2024-11-27 DIAGNOSIS — I10 PRIMARY HYPERTENSION: ICD-10-CM

## 2024-11-27 LAB
ALBUMIN SERPL-MCNC: 4.2 G/DL (ref 3.4–5)
ALBUMIN/GLOB SERPL: 1.2 (ref 0.8–1.7)
ALP SERPL-CCNC: 101 U/L (ref 45–117)
ALT SERPL-CCNC: 31 U/L (ref 16–61)
ANION GAP SERPL CALC-SCNC: 8 MMOL/L (ref 3–18)
AST SERPL-CCNC: 16 U/L (ref 10–38)
BILIRUB SERPL-MCNC: 1.2 MG/DL (ref 0.2–1)
BUN SERPL-MCNC: 15 MG/DL (ref 7–18)
BUN/CREAT SERPL: 13 (ref 12–20)
CALCIUM SERPL-MCNC: 9.6 MG/DL (ref 8.5–10.1)
CHLORIDE SERPL-SCNC: 103 MMOL/L (ref 100–111)
CHOLEST SERPL-MCNC: 204 MG/DL
CO2 SERPL-SCNC: 26 MMOL/L (ref 21–32)
CREAT SERPL-MCNC: 1.14 MG/DL (ref 0.6–1.3)
ERYTHROCYTE [DISTWIDTH] IN BLOOD BY AUTOMATED COUNT: 13.8 % (ref 11.6–14.5)
GLOBULIN SER CALC-MCNC: 3.6 G/DL (ref 2–4)
GLUCOSE SERPL-MCNC: 109 MG/DL (ref 74–99)
HCT VFR BLD AUTO: 48.3 % (ref 36–48)
HDLC SERPL-MCNC: 45 MG/DL (ref 40–60)
HDLC SERPL: 4.5 (ref 0–5)
HGB BLD-MCNC: 15.8 G/DL (ref 13–16)
LDLC SERPL CALC-MCNC: 143.4 MG/DL (ref 0–100)
LIPID PANEL: ABNORMAL
MCH RBC QN AUTO: 28.2 PG (ref 24–34)
MCHC RBC AUTO-ENTMCNC: 32.7 G/DL (ref 31–37)
MCV RBC AUTO: 86.3 FL (ref 78–100)
NRBC # BLD: 0 K/UL (ref 0–0.01)
NRBC BLD-RTO: 0 PER 100 WBC
PLATELET # BLD AUTO: 383 K/UL (ref 135–420)
PMV BLD AUTO: 9.9 FL (ref 9.2–11.8)
POTASSIUM SERPL-SCNC: 4.3 MMOL/L (ref 3.5–5.5)
PROT SERPL-MCNC: 7.8 G/DL (ref 6.4–8.2)
RBC # BLD AUTO: 5.6 M/UL (ref 4.35–5.65)
SODIUM SERPL-SCNC: 137 MMOL/L (ref 136–145)
TRIGL SERPL-MCNC: 78 MG/DL
TSH SERPL DL<=0.05 MIU/L-ACNC: 1.3 UIU/ML (ref 0.36–3.74)
VLDLC SERPL CALC-MCNC: 15.6 MG/DL
WBC # BLD AUTO: 9.9 K/UL (ref 4.6–13.2)

## 2024-11-27 PROCEDURE — 85027 COMPLETE CBC AUTOMATED: CPT

## 2024-11-27 PROCEDURE — 84443 ASSAY THYROID STIM HORMONE: CPT

## 2024-11-27 PROCEDURE — 36415 COLL VENOUS BLD VENIPUNCTURE: CPT

## 2024-11-27 PROCEDURE — 80053 COMPREHEN METABOLIC PANEL: CPT

## 2024-11-27 PROCEDURE — 80061 LIPID PANEL: CPT

## 2024-12-04 ENCOUNTER — OFFICE VISIT (OUTPATIENT)
Facility: CLINIC | Age: 39
End: 2024-12-04
Payer: COMMERCIAL

## 2024-12-04 VITALS
SYSTOLIC BLOOD PRESSURE: 97 MMHG | HEART RATE: 74 BPM | WEIGHT: 314.8 LBS | OXYGEN SATURATION: 94 % | TEMPERATURE: 98 F | HEIGHT: 67 IN | BODY MASS INDEX: 49.41 KG/M2 | RESPIRATION RATE: 18 BRPM | DIASTOLIC BLOOD PRESSURE: 67 MMHG

## 2024-12-04 DIAGNOSIS — Z00.00 ANNUAL PHYSICAL EXAM: Primary | ICD-10-CM

## 2024-12-04 DIAGNOSIS — I10 PRIMARY HYPERTENSION: ICD-10-CM

## 2024-12-04 DIAGNOSIS — E03.9 ACQUIRED HYPOTHYROIDISM: ICD-10-CM

## 2024-12-04 DIAGNOSIS — E55.9 VITAMIN D DEFICIENCY: ICD-10-CM

## 2024-12-04 DIAGNOSIS — R73.03 PRE-DIABETES: ICD-10-CM

## 2024-12-04 DIAGNOSIS — G44.89 OTHER HEADACHE SYNDROME: ICD-10-CM

## 2024-12-04 PROCEDURE — 3078F DIAST BP <80 MM HG: CPT | Performed by: NURSE PRACTITIONER

## 2024-12-04 PROCEDURE — 3074F SYST BP LT 130 MM HG: CPT | Performed by: NURSE PRACTITIONER

## 2024-12-04 PROCEDURE — 99395 PREV VISIT EST AGE 18-39: CPT | Performed by: NURSE PRACTITIONER

## 2024-12-04 SDOH — ECONOMIC STABILITY: FOOD INSECURITY: WITHIN THE PAST 12 MONTHS, THE FOOD YOU BOUGHT JUST DIDN'T LAST AND YOU DIDN'T HAVE MONEY TO GET MORE.: NEVER TRUE

## 2024-12-04 SDOH — ECONOMIC STABILITY: FOOD INSECURITY: WITHIN THE PAST 12 MONTHS, YOU WORRIED THAT YOUR FOOD WOULD RUN OUT BEFORE YOU GOT MONEY TO BUY MORE.: NEVER TRUE

## 2024-12-04 SDOH — ECONOMIC STABILITY: INCOME INSECURITY: HOW HARD IS IT FOR YOU TO PAY FOR THE VERY BASICS LIKE FOOD, HOUSING, MEDICAL CARE, AND HEATING?: NOT HARD AT ALL

## 2024-12-04 ASSESSMENT — ENCOUNTER SYMPTOMS
GASTROINTESTINAL NEGATIVE: 1
EYES NEGATIVE: 1
RESPIRATORY NEGATIVE: 1

## 2024-12-04 ASSESSMENT — PATIENT HEALTH QUESTIONNAIRE - PHQ9
1. LITTLE INTEREST OR PLEASURE IN DOING THINGS: NOT AT ALL
SUM OF ALL RESPONSES TO PHQ QUESTIONS 1-9: 0
SUM OF ALL RESPONSES TO PHQ QUESTIONS 1-9: 0
SUM OF ALL RESPONSES TO PHQ9 QUESTIONS 1 & 2: 0
SUM OF ALL RESPONSES TO PHQ QUESTIONS 1-9: 0
2. FEELING DOWN, DEPRESSED OR HOPELESS: NOT AT ALL
SUM OF ALL RESPONSES TO PHQ QUESTIONS 1-9: 0

## 2024-12-04 NOTE — PROGRESS NOTES
Room 7      Gerber Jang had concerns including Follow-up Chronic Condition, Hypertension, and Hypothyroidism. for today's visit .           Did you take your medication today? Patient states yes       1. \"Have you been to the ER, urgent care clinic since your last visit?  Hospitalized since your last visit?\" .NO    2. \"Have you seen or consulted any other health care providers outside of the [facility] since your last visit?\" No     3. For patients aged 45-75: Has the patient had a colonoscopy / FIT/ Cologuard? N/A      If the patient is female:    4. For patients aged 40-74: Has the patient had a mammogram within the past 2 years? N/A      5. For patients aged 21-65: Has the patient had a pap smear? {Cancer Care Gap present? N/A            12/18/2020     2:21 PM   Amb Fall Risk Assessment and TUG Test   Fall in past 12 months? 0   Able to walk? Yes              12/4/2024     8:58 AM   PHQ-9    Little interest or pleasure in doing things 0   Feeling down, depressed, or hopeless 0   PHQ-2 Score 0   PHQ-9 Total Score 0          Health Maintenance Due   Topic Date Due    Varicella vaccine (1 of 2 - 13+ 2-dose series) Never done    Hepatitis B vaccine (1 of 3 - 19+ 3-dose series) Never done    DTaP/Tdap/Td vaccine (1 - Tdap) Never done    Flu vaccine (1) Never done    COVID-19 Vaccine (3 - 2023-24 season) 09/01/2024             
Blockers       amLODIPine (NORVASC) 10 MG tablet TAKE 1 TABLET BY MOUTH DAILY       Antihypertensive Combinations       lisinopril-hydroCHLOROthiazide (PRINZIDE;ZESTORETIC) 20-25 MG per tablet TAKE 1 TABLET BY MOUTH DAILY           Endocrine Review  Patient is seen for followup of hypothyroidism.  Since last visit: no changes.  He reports medication compliance: all the time, but is NOT taking separate from his other meds.  He reports the following concerns/problems/med side effects: none.  Lab review: labs are reviewed, up to date and normal, labs reviewed and discussed with patient.  Lab Results   Component Value Date/Time    TSH 1.30 11/27/2024 08:50 AM   ]    ROS:     Review of Systems   Constitutional: Negative.    HENT: Negative.     Eyes: Negative.    Respiratory: Negative.     Cardiovascular: Negative.    Gastrointestinal: Negative.    Endocrine: Negative.    Genitourinary: Negative.    Musculoskeletal: Negative.    Skin: Negative.    Neurological:  Positive for headaches.        Headache: located in different areas of his head, does not wake up with a headache, has about 2-3 headaches a week  Treatment: nothing  Never pounding more like a lingering  Last eye exam 3/2024  Onset: last month   Psychiatric/Behavioral: Negative.           The problem list was updated as a part of today's visit.  Patient Active Problem List   Diagnosis    Acquired hypothyroidism    Pre-diabetes    Obesity, morbid    Elevated LDL cholesterol level    Vitamin D deficiency    Primary hypertension    Gastroesophageal reflux disease without esophagitis       The PSH, FH were reviewed.      SH:  Social History     Tobacco Use    Smoking status: Never    Smokeless tobacco: Never   Substance Use Topics    Alcohol use: Yes    Drug use: No       Medications/Allergies:  Current Outpatient Medications on File Prior to Visit   Medication Sig Dispense Refill    amLODIPine (NORVASC) 10 MG tablet TAKE 1 TABLET BY MOUTH DAILY 90 tablet 1

## 2025-01-13 ASSESSMENT — SLEEP AND FATIGUE QUESTIONNAIRES
ESS TOTAL SCORE: 9
HOW LIKELY ARE YOU TO NOD OFF OR FALL ASLEEP WHILE SITTING INACTIVE IN A PUBLIC PLACE: SLIGHT CHANCE OF DOZING
HOW LIKELY ARE YOU TO NOD OFF OR FALL ASLEEP WHILE LYING DOWN TO REST IN THE AFTERNOON WHEN CIRCUMSTANCES PERMIT: MODERATE CHANCE OF DOZING
HOW LIKELY ARE YOU TO NOD OFF OR FALL ASLEEP WHILE SITTING INACTIVE IN A PUBLIC PLACE: SLIGHT CHANCE OF DOZING
HOW LIKELY ARE YOU TO NOD OFF OR FALL ASLEEP WHILE SITTING AND TALKING TO SOMEONE: WOULD NEVER DOZE
HOW LIKELY ARE YOU TO NOD OFF OR FALL ASLEEP WHILE SITTING AND READING: SLIGHT CHANCE OF DOZING
HOW LIKELY ARE YOU TO NOD OFF OR FALL ASLEEP WHILE WATCHING TV: MODERATE CHANCE OF DOZING
HOW LIKELY ARE YOU TO NOD OFF OR FALL ASLEEP WHILE LYING DOWN TO REST IN THE AFTERNOON WHEN CIRCUMSTANCES PERMIT: MODERATE CHANCE OF DOZING
HOW LIKELY ARE YOU TO NOD OFF OR FALL ASLEEP WHILE SITTING QUIETLY AFTER LUNCH WITHOUT ALCOHOL: SLIGHT CHANCE OF DOZING
HOW LIKELY ARE YOU TO NOD OFF OR FALL ASLEEP WHILE SITTING AND TALKING TO SOMEONE: WOULD NEVER DOZE
HOW LIKELY ARE YOU TO NOD OFF OR FALL ASLEEP IN A CAR, WHILE STOPPED FOR A FEW MINUTES IN TRAFFIC: WOULD NEVER DOZE
HOW LIKELY ARE YOU TO NOD OFF OR FALL ASLEEP IN A CAR, WHILE STOPPED FOR A FEW MINUTES IN TRAFFIC: WOULD NEVER DOZE
HOW LIKELY ARE YOU TO NOD OFF OR FALL ASLEEP WHILE SITTING QUIETLY AFTER LUNCH WITHOUT ALCOHOL: SLIGHT CHANCE OF DOZING
HOW LIKELY ARE YOU TO NOD OFF OR FALL ASLEEP WHILE WATCHING TV: MODERATE CHANCE OF DOZING
HOW LIKELY ARE YOU TO NOD OFF OR FALL ASLEEP WHEN YOU ARE A PASSENGER IN A CAR FOR AN HOUR WITHOUT A BREAK: MODERATE CHANCE OF DOZING
HOW LIKELY ARE YOU TO NOD OFF OR FALL ASLEEP WHEN YOU ARE A PASSENGER IN A CAR FOR AN HOUR WITHOUT A BREAK: MODERATE CHANCE OF DOZING
HOW LIKELY ARE YOU TO NOD OFF OR FALL ASLEEP WHILE SITTING AND READING: SLIGHT CHANCE OF DOZING

## 2025-01-16 ENCOUNTER — OFFICE VISIT (OUTPATIENT)
Age: 40
End: 2025-01-16
Payer: COMMERCIAL

## 2025-01-16 VITALS
HEIGHT: 67 IN | DIASTOLIC BLOOD PRESSURE: 69 MMHG | TEMPERATURE: 97.7 F | HEART RATE: 77 BPM | OXYGEN SATURATION: 95 % | BODY MASS INDEX: 48.94 KG/M2 | RESPIRATION RATE: 18 BRPM | WEIGHT: 311.8 LBS | SYSTOLIC BLOOD PRESSURE: 108 MMHG

## 2025-01-16 DIAGNOSIS — R73.03 PRE-DIABETES: ICD-10-CM

## 2025-01-16 DIAGNOSIS — E66.01 MORBID OBESITY WITH BMI OF 45.0-49.9, ADULT: ICD-10-CM

## 2025-01-16 DIAGNOSIS — R29.818 SUSPECTED SLEEP APNEA: Primary | ICD-10-CM

## 2025-01-16 DIAGNOSIS — R06.83 LOUD SNORING: ICD-10-CM

## 2025-01-16 DIAGNOSIS — I10 PRIMARY HYPERTENSION: ICD-10-CM

## 2025-01-16 PROCEDURE — 99204 OFFICE O/P NEW MOD 45 MIN: CPT | Performed by: OTOLARYNGOLOGY

## 2025-01-16 PROCEDURE — 3074F SYST BP LT 130 MM HG: CPT | Performed by: OTOLARYNGOLOGY

## 2025-01-16 PROCEDURE — 3078F DIAST BP <80 MM HG: CPT | Performed by: OTOLARYNGOLOGY

## 2025-01-16 ASSESSMENT — PATIENT HEALTH QUESTIONNAIRE - PHQ9
SUM OF ALL RESPONSES TO PHQ QUESTIONS 1-9: 0
1. LITTLE INTEREST OR PLEASURE IN DOING THINGS: NOT AT ALL
2. FEELING DOWN, DEPRESSED OR HOPELESS: NOT AT ALL
SUM OF ALL RESPONSES TO PHQ QUESTIONS 1-9: 0
SUM OF ALL RESPONSES TO PHQ9 QUESTIONS 1 & 2: 0
SUM OF ALL RESPONSES TO PHQ QUESTIONS 1-9: 0
SUM OF ALL RESPONSES TO PHQ QUESTIONS 1-9: 0

## 2025-01-16 NOTE — PROGRESS NOTES
Gerber Jang presents today for   Chief Complaint   Patient presents with    Snoring    Sleep Problem       Is someone accompanying this pt? Yes, wife    Is the patient using any DME equipment during OV? no    -DME Company: N/A    Have you ever had a sleep study done before? no    Depression Screenin/16/2025    11:08 AM   PHQ-9    Little interest or pleasure in doing things 0   Feeling down, depressed, or hopeless 0   PHQ-2 Score 0   PHQ-9 Total Score 0        [place] Sleepiness Scale:      2025     7:39 AM   Sleep Medicine   Sitting and reading 1   Watching TV 2   Sitting, inactive in a public place (e.g. a theatre or a meeting) 1   As a passenger in a car for an hour without a break 2   Lying down to rest in the afternoon when circumstances permit 2   Sitting and talking to someone 0   Sitting quietly after a lunch without alcohol 1   In a car, while stopped for a few minutes in traffic 0   [place] Sleepiness Score 9   Neck (Inches) 16       Stop-Ban/16/2025    11:00 AM   STOP-BANG QUESTIONNAIRE   Are you a loud and/or regular snorer? 1   Do you often feel tired or groggy upon awakening or do you awaken with a headache? 1   Have you been observed to gasp or stop breathing during sleep? 1   Are you often tired or fatigued during wake time hours?  0   Do you fall asleep sitting, reading, watching TV or driving? 0   Do you often have problems with memory or concentration? 0   Do you have or are you being treated for high blood pressure? 1   Recent BMI (Calculated) 49.4   Is BMI greater than 35 kg/m2? 1=Yes   Age older than 50 years old? 0=No   Is your neck circumference greater than 17 inches (Male) or 16 inches (Female)? 0   Gender - Male 1=Yes   STOP-Bang Total Score 55.4         Coordination of Care:  1. Have you been to the ER, urgent care clinic since your last visit? Hospitalized since your last visit? no    2. Have you seen or consulted any other health care providers outside of the [facility] 
Transportation (Medical): Not on file     Lack of Transportation (Non-Medical): No   Physical Activity: Not on file   Stress: Not on file   Social Connections: Not on file   Intimate Partner Violence: Not on file   Housing Stability: Unknown (12/4/2024)    Housing Stability Vital Sign     Unable to Pay for Housing in the Last Year: Not on file     Number of Times Moved in the Last Year: Not on file     Homeless in the Last Year: No        Current Outpatient Medications   Medication Instructions    amLODIPine (NORVASC) 10 mg, Oral, DAILY    levothyroxine (SYNTHROID) 25 mcg, Oral, DAILY BEFORE BREAKFAST    lisinopril-hydroCHLOROthiazide (PRINZIDE;ZESTORETIC) 20-25 MG per tablet 1 tablet, Oral, DAILY    vitamin D 25 MCG (1000 UT) CAPS Oral, DAILY       Allergies as of 01/16/2025    (No Known Allergies)       Patient Active Problem List   Diagnosis    Acquired hypothyroidism    Pre-diabetes    Morbid obesity with BMI of 45.0-49.9, adult    Elevated LDL cholesterol level    Vitamin D deficiency    Primary hypertension    Gastroesophageal reflux disease without esophagitis       Past Medical History:   Diagnosis Date    Arthritis     Headache 2018    htn with retinal hemmorhage     Hypertension 02/2018    with ICH       Past Surgical History:   Procedure Laterality Date    ORTHOPEDIC SURGERY      Hand Surgery       Family History   Problem Relation Age of Onset    Hypertension Mother 50    Diabetes Father     Hypertension Father 40       Objective:     Vitals:    Weight BMI   Wt Readings from Last 3 Encounters:   01/16/25 (!) 141.4 kg (311 lb 12.8 oz)   12/04/24 (!) 142.8 kg (314 lb 12.8 oz)   05/29/24 (!) 146.4 kg (322 lb 12.8 oz)    Body mass index is 48.83 kg/m².     BP HR SaO2   BP Readings from Last 3 Encounters:   01/16/25 108/69   12/04/24 97/67   05/29/24 114/77    Pulse Readings from Last 3 Encounters:   01/16/25 77   12/04/24 74   05/29/24 84    SpO2 Readings from Last 3 Encounters:   01/16/25 95%   12/04/24 94%

## 2025-01-16 NOTE — PATIENT INSTRUCTIONS
Please make a follow up appointment to discuss the results of your sleep study. If this is impossible for some reason, please send me a \"My Chart\" message so that I may get back with you in a timely manner.    The [facility] Sleep Lab is located in the [facility], adjacent to [facility]. The lab is on the second floor. The direct number to call for sleep study related questions is: [phone].    Please call our clinic back at [phone] or send a message on [employer] if you have any questions or concerns or if you are experiencing any of the following:     You have not received a follow up appointment within 30 days prior the recommended follow up time.    If you are not tolerating treatment plan and/or not able to obtain equipment or prescribed medication(s).  if you are experiencing any difficulties with the Durable Medical Equipment  (DME) Company you may be using or is assigned to you.  Two weeks have passed and you have not received an appointment for a scheduled procedure.  Two weeks have passed since you underwent a test and/or procedure and you have not received your results.     If you are using a CPAP/BIPAP, or Home Ventilator Device- Please note the following.  Currently, many DMEs are experiencing supply chain difficulties and orders for equipment may be back logged several weeks.     Your  Durable Medical Equipment (DME ) company is supposed to provide you with replacement filters, tubing and masks. You can either call your DME when you need new supplies or you can arrange for an automatic shipment schedule.    Your need to be seen by our office at lat minimum of every 12 months in order to renew the prescription for these supplies.   Please make note of who your DME company is and their phone number.   Please make sure that you clean your mask and hosing on a regular basis.  Your DME can provide you with additional information regarding proper care and cleaning of your

## 2025-02-11 ENCOUNTER — OFFICE VISIT (OUTPATIENT)
Facility: CLINIC | Age: 40
End: 2025-02-11

## 2025-02-11 VITALS
HEART RATE: 65 BPM | WEIGHT: 311.4 LBS | DIASTOLIC BLOOD PRESSURE: 78 MMHG | TEMPERATURE: 97 F | HEIGHT: 67 IN | BODY MASS INDEX: 48.88 KG/M2 | RESPIRATION RATE: 18 BRPM | SYSTOLIC BLOOD PRESSURE: 116 MMHG | OXYGEN SATURATION: 95 %

## 2025-02-11 DIAGNOSIS — J01.90 ACUTE NON-RECURRENT SINUSITIS, UNSPECIFIED LOCATION: Primary | ICD-10-CM

## 2025-02-11 DIAGNOSIS — H93.8X2 CONGESTION OF LEFT EAR: ICD-10-CM

## 2025-02-11 RX ORDER — AZELASTINE 1 MG/ML
1 SPRAY, METERED NASAL 2 TIMES DAILY
Qty: 30 ML | Refills: 0 | Status: SHIPPED | OUTPATIENT
Start: 2025-02-11

## 2025-02-11 SDOH — ECONOMIC STABILITY: FOOD INSECURITY: WITHIN THE PAST 12 MONTHS, YOU WORRIED THAT YOUR FOOD WOULD RUN OUT BEFORE YOU GOT MONEY TO BUY MORE.: NEVER TRUE

## 2025-02-11 SDOH — ECONOMIC STABILITY: FOOD INSECURITY: WITHIN THE PAST 12 MONTHS, THE FOOD YOU BOUGHT JUST DIDN'T LAST AND YOU DIDN'T HAVE MONEY TO GET MORE.: NEVER TRUE

## 2025-02-11 ASSESSMENT — PATIENT HEALTH QUESTIONNAIRE - PHQ9
SUM OF ALL RESPONSES TO PHQ QUESTIONS 1-9: 0
SUM OF ALL RESPONSES TO PHQ QUESTIONS 1-9: 0
2. FEELING DOWN, DEPRESSED OR HOPELESS: NOT AT ALL
SUM OF ALL RESPONSES TO PHQ9 QUESTIONS 1 & 2: 0
SUM OF ALL RESPONSES TO PHQ QUESTIONS 1-9: 0
1. LITTLE INTEREST OR PLEASURE IN DOING THINGS: NOT AT ALL
SUM OF ALL RESPONSES TO PHQ QUESTIONS 1-9: 0

## 2025-02-11 NOTE — PROGRESS NOTES
Room 7    Gerber Jang had concerns including Ear Pain. for today's visit .     When asked if patient has any concerns he would like to address with ALEXIS Godoy patient states I am having discomfort around the side of my head/ left ear . ALEXIS Godoy has been notified  of patient concerns .      1. \"Have you been to the ER, urgent care clinic since your last visit?  Hospitalized since your last visit?\" .NO    2. \"Have you seen or consulted any other health care providers outside of the [facility] since your last visit?\" NO     3. For patients aged 45-75: Has the patient had a colonoscopy / FIT/ Cologuard? N/A      If the patient is female:    4. For patients aged 40-74: Has the patient had a mammogram within the past 2 years? N/A      5. For patients aged 21-65: Has the patient had a pap smear? {Cancer Care Gap present?    When asked if patient menstrual cycle is normal patient states yes, no .          2/11/2025     3:17 PM   PHQ-9    Little interest or pleasure in doing things 0   Feeling down, depressed, or hopeless 0   PHQ-2 Score 0   PHQ-9 Total Score 0          Health Maintenance Due   Topic Date Due    Varicella vaccine (1 of 2 - 13+ 2-dose series) Never done    Hepatitis B vaccine (1 of 3 - 19+ 3-dose series) Never done    DTaP/Tdap/Td vaccine (1 - Tdap) Never done    Flu vaccine (1) Never done    COVID-19 Vaccine (3 - 2024-25 season) 09/01/2024

## 2025-02-11 NOTE — PROGRESS NOTES
[address] [place] [address]               [phone]      Gerber Jang is a 39 y.o. male and presents with Ear Pain       Assessment/Plan:    1. Acute non-recurrent sinusitis, unspecified location  2. Congestion of left ear  -     azelastine (ASTELIN) 0.1 % nasal spray; 1 spray by Nasal route 2 times daily Use in each nostril as directed, Disp-30 mL, R-0Normal    Most likely a sinusits, recommendations for symptomatic relief provided  Patient verbalized understanding and is in agreement with this plan of care          Follow up and disposition:   Return for keep previously scheduled follow up.      Subjective:    Labs obtained prior to visit? No  Reviewed with patient? N/A    Pressure in and around left ear  Onset: 6 days ago  Denies pain  Feels a intermittent pressure in his ear, radiating to his left cheek, and on his head above the ear  Treatments: flonase nasal spray  Has never had this problem before  Denies decreased hearing, ear drainage, sinus congestion: clear when he blows his nose, popping or ringing noises      ROS:     Review of Systems  As stated in HPI, otherwise all others negative.       The problem list was updated as a part of today's visit.  Patient Active Problem List   Diagnosis    Acquired hypothyroidism    Pre-diabetes    Morbid obesity with BMI of 45.0-49.9, adult    Elevated LDL cholesterol level    Vitamin D deficiency    Primary hypertension    Gastroesophageal reflux disease without esophagitis       The PSH, FH were reviewed.      SH:  Social History     Tobacco Use    Smoking status: Never    Smokeless tobacco: Never   Substance Use Topics    Alcohol use: Not Currently    Drug use: No       Medications/Allergies:  Current Outpatient Medications on File Prior to Visit   Medication Sig Dispense Refill    amLODIPine (NORVASC) 10 MG tablet TAKE 1 TABLET BY MOUTH DAILY 90 tablet 1    levothyroxine (SYNTHROID) 25 MCG tablet TAKE 1 TABLET BY MOUTH EVERY

## 2025-03-10 DIAGNOSIS — R51.9 ACUTE NONINTRACTABLE HEADACHE, UNSPECIFIED HEADACHE TYPE: Primary | ICD-10-CM

## 2025-03-20 ENCOUNTER — HOSPITAL ENCOUNTER (OUTPATIENT)
Dept: SLEEP MEDICINE | Facility: HOSPITAL | Age: 40
Discharge: HOME | End: 2025-03-23
Attending: OTOLARYNGOLOGY
Payer: COMMERCIAL

## 2025-03-20 DIAGNOSIS — R29.818 SUSPECTED SLEEP APNEA: ICD-10-CM

## 2025-03-20 PROCEDURE — 95800 SLP STDY UNATTENDED: CPT

## 2025-05-05 ENCOUNTER — TELEPHONE (OUTPATIENT)
Age: 40
End: 2025-05-05

## 2025-05-05 NOTE — TELEPHONE ENCOUNTER
Patient checking on results of sleep study from 3/20,     Pauline sent this message:    He did have a HST completed and for some reason, I didn't get placed into Dr. Knowles's queue to read.    I just updated it so she can see it.

## 2025-05-06 PROBLEM — G47.33 OSA (OBSTRUCTIVE SLEEP APNEA): Status: ACTIVE | Noted: 2025-05-06

## 2025-05-08 DIAGNOSIS — G47.33 OSA (OBSTRUCTIVE SLEEP APNEA): Primary | ICD-10-CM

## 2025-05-21 ENCOUNTER — CLINICAL DOCUMENTATION (OUTPATIENT)
Age: 40
End: 2025-05-21

## 2025-06-04 ENCOUNTER — HOSPITAL ENCOUNTER (OUTPATIENT)
Facility: HOSPITAL | Age: 40
Setting detail: SPECIMEN
Discharge: HOME | End: 2025-06-07
Payer: COMMERCIAL

## 2025-06-04 DIAGNOSIS — I10 PRIMARY HYPERTENSION: ICD-10-CM

## 2025-06-04 DIAGNOSIS — E55.9 VITAMIN D DEFICIENCY: ICD-10-CM

## 2025-06-04 DIAGNOSIS — E03.9 ACQUIRED HYPOTHYROIDISM: ICD-10-CM

## 2025-06-04 DIAGNOSIS — R73.03 PRE-DIABETES: ICD-10-CM

## 2025-06-04 LAB
25(OH)D3 SERPL-MCNC: 30.4 NG/ML (ref 30–100)
ALBUMIN SERPL-MCNC: 4.2 G/DL (ref 3.4–5)
ALBUMIN/GLOB SERPL: 1.3 (ref 0.8–1.7)
ALP SERPL-CCNC: 86 U/L (ref 45–117)
ALT SERPL-CCNC: 19 U/L (ref 10–50)
ANION GAP SERPL CALC-SCNC: 13 MMOL/L (ref 3–18)
AST SERPL-CCNC: 14 U/L (ref 10–38)
BILIRUB SERPL-MCNC: 1 MG/DL (ref 0.2–1)
BUN SERPL-MCNC: 18 MG/DL (ref 6–23)
BUN/CREAT SERPL: 18 (ref 12–20)
CALCIUM SERPL-MCNC: 10.2 MG/DL (ref 8.5–10.1)
CHLORIDE SERPL-SCNC: 101 MMOL/L (ref 98–107)
CHOLEST SERPL-MCNC: 183 MG/DL
CO2 SERPL-SCNC: 23 MMOL/L (ref 21–32)
CREAT SERPL-MCNC: 0.98 MG/DL (ref 0.6–1.3)
ERYTHROCYTE [DISTWIDTH] IN BLOOD BY AUTOMATED COUNT: 13.5 % (ref 11.6–14.5)
EST. AVERAGE GLUCOSE BLD GHB EST-MCNC: 128 MG/DL
GLOBULIN SER CALC-MCNC: 3.3 G/DL (ref 2–4)
GLUCOSE SERPL-MCNC: 105 MG/DL (ref 74–108)
HBA1C MFR BLD: 6.1 % (ref 4.2–5.6)
HCT VFR BLD AUTO: 49.4 % (ref 36–48)
HDLC SERPL-MCNC: 37 MG/DL (ref 40–60)
HDLC SERPL: 4.9 (ref 0–5)
HGB BLD-MCNC: 15.6 G/DL (ref 13–16)
LDLC SERPL CALC-MCNC: 129 MG/DL (ref 0–100)
MCH RBC QN AUTO: 27.8 PG (ref 24–34)
MCHC RBC AUTO-ENTMCNC: 31.6 G/DL (ref 31–37)
MCV RBC AUTO: 87.9 FL (ref 78–100)
NRBC # BLD: 0 K/UL (ref 0–0.01)
NRBC BLD-RTO: 0 PER 100 WBC
PLATELET # BLD AUTO: 403 K/UL (ref 135–420)
PMV BLD AUTO: 10 FL (ref 9.2–11.8)
POTASSIUM SERPL-SCNC: 5.1 MMOL/L (ref 3.5–5.5)
PROT SERPL-MCNC: 7.4 G/DL (ref 6.4–8.2)
RBC # BLD AUTO: 5.62 M/UL (ref 4.35–5.65)
SODIUM SERPL-SCNC: 138 MMOL/L (ref 136–145)
TRIGL SERPL-MCNC: 83 MG/DL (ref 0–150)
TSH, 3RD GENERATION: 1.43 UIU/ML (ref 0.27–4.2)
VLDLC SERPL CALC-MCNC: 17 MG/DL
WBC # BLD AUTO: 10.7 K/UL (ref 4.6–13.2)

## 2025-06-04 PROCEDURE — 80061 LIPID PANEL: CPT

## 2025-06-04 PROCEDURE — 84443 ASSAY THYROID STIM HORMONE: CPT

## 2025-06-04 PROCEDURE — 80053 COMPREHEN METABOLIC PANEL: CPT

## 2025-06-04 PROCEDURE — 36415 COLL VENOUS BLD VENIPUNCTURE: CPT

## 2025-06-04 PROCEDURE — 82306 VITAMIN D 25 HYDROXY: CPT

## 2025-06-04 PROCEDURE — 85027 COMPLETE CBC AUTOMATED: CPT

## 2025-06-04 PROCEDURE — 83036 HEMOGLOBIN GLYCOSYLATED A1C: CPT

## 2025-06-04 RX ORDER — AMLODIPINE BESYLATE 10 MG/1
10 TABLET ORAL DAILY
Qty: 90 TABLET | Refills: 1 | Status: SHIPPED | OUTPATIENT
Start: 2025-06-04

## 2025-06-04 RX ORDER — LEVOTHYROXINE SODIUM 25 UG/1
25 TABLET ORAL
Qty: 90 TABLET | Refills: 1 | Status: SHIPPED | OUTPATIENT
Start: 2025-06-04

## 2025-06-04 RX ORDER — LISINOPRIL AND HYDROCHLOROTHIAZIDE 20; 25 MG/1; MG/1
1 TABLET ORAL DAILY
Qty: 90 TABLET | Refills: 1 | Status: SHIPPED | OUTPATIENT
Start: 2025-06-04

## 2025-06-11 ENCOUNTER — OFFICE VISIT (OUTPATIENT)
Facility: CLINIC | Age: 40
End: 2025-06-11
Payer: COMMERCIAL

## 2025-06-11 VITALS
OXYGEN SATURATION: 94 % | HEIGHT: 67 IN | TEMPERATURE: 97.6 F | SYSTOLIC BLOOD PRESSURE: 108 MMHG | BODY MASS INDEX: 48.53 KG/M2 | RESPIRATION RATE: 18 BRPM | HEART RATE: 83 BPM | DIASTOLIC BLOOD PRESSURE: 75 MMHG | WEIGHT: 309.2 LBS

## 2025-06-11 DIAGNOSIS — E78.00 ELEVATED LDL CHOLESTEROL LEVEL: ICD-10-CM

## 2025-06-11 DIAGNOSIS — I10 PRIMARY HYPERTENSION: Primary | ICD-10-CM

## 2025-06-11 DIAGNOSIS — E03.9 ACQUIRED HYPOTHYROIDISM: ICD-10-CM

## 2025-06-11 DIAGNOSIS — E66.01 MORBID OBESITY WITH BMI OF 45.0-49.9, ADULT (HCC): ICD-10-CM

## 2025-06-11 DIAGNOSIS — R73.03 PRE-DIABETES: ICD-10-CM

## 2025-06-11 DIAGNOSIS — M79.662 PAIN OF LEFT CALF: ICD-10-CM

## 2025-06-11 PROCEDURE — 3074F SYST BP LT 130 MM HG: CPT | Performed by: NURSE PRACTITIONER

## 2025-06-11 PROCEDURE — 3078F DIAST BP <80 MM HG: CPT | Performed by: NURSE PRACTITIONER

## 2025-06-11 PROCEDURE — 99214 OFFICE O/P EST MOD 30 MIN: CPT | Performed by: NURSE PRACTITIONER

## 2025-06-11 ASSESSMENT — PATIENT HEALTH QUESTIONNAIRE - PHQ9
1. LITTLE INTEREST OR PLEASURE IN DOING THINGS: NOT AT ALL
2. FEELING DOWN, DEPRESSED OR HOPELESS: NOT AT ALL
SUM OF ALL RESPONSES TO PHQ QUESTIONS 1-9: 0

## 2025-06-11 NOTE — PROGRESS NOTES
Have you been to the ER, urgent care clinic since your last visit?  Hospitalized since your last visit?   Yes- [facility] in February for headaches    Have you seen or consulted any other health care providers outside our system since your last visit?   NO- Pt seeing a Neurologist 6/30/2025          
After-Visit Summary and questions were answered concerning future plans.     An After Visit Summary was printed and given to the patient.    All diagnosis have been discussed with the patient and all of the patient's questions have been answered.     The patient (or guardian, if applicable) and other individuals in attendance with the patient were advised that Artificial Intelligence will be utilized during this visit to record, process the conversation to generate a clinical note, and support improvement of the AI technology. The patient (or guardian, if applicable) and other individuals in attendance at the appointment consented to the use of AI, including the recording.        [facility]  [address] [id number]

## 2025-06-30 NOTE — PROGRESS NOTES
SUBJECTIVE:   Gerber Jang is a 39 y.o. male seen regarding the following:     Chief Complaint   Patient presents with    New Patient    Headache       HPI:  Gerber Jang, a 39 y.o. male , is here to establish care for migraines.  He was referred by his PCP in March 2025. He has a pertinent medical history of hypertension, JUDD on CPAP (started 3 weeks ago), Vit D Deficiency, hypothyroidism, ICH in 2018, prediabetes at 6.1 A1c, morbid obesity at 48 BMI. 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 BP today is 98/71. He works at [employer]. He takes his BP routinely, normally 125/85. He had an eye exam in March but not dilated. He did not tell them he was having HAs.     He started getting HA in January, 2025. He described his HAs as pain on the left side of his head, behind his eyes and the left top of his head. HA pop up randomly through the day. He feels it is centralized. He will get occasionally--1-2.wks. Last about 45 min to several hours. Dull pain--not sharp, not throbbing, 2-4/10, he just knows it there. He can hear his jaw pop on his right side but both sides jaw hurt. Went to dentist in May and told them and they didn't do anything. He takes no medication for it--just waits it out. He knows no known triggers and there is nothing that makes it worse. He gets no warning that it will occur. He does not wake up with it and the HAs can occur at variable times. States they do not occur at night. Does not notice it occurs with meals. Took Tylenol in the past but felt it did not help. No falls prior to the HAs starting. Not affected by light or loud noises. Went to a chiropractor x2 weeks but didn't help HAs. Does not drink caffeine due to the way it

## 2025-07-01 ENCOUNTER — OFFICE VISIT (OUTPATIENT)
Age: 40
End: 2025-07-01
Payer: COMMERCIAL

## 2025-07-01 VITALS
HEIGHT: 67 IN | OXYGEN SATURATION: 96 % | TEMPERATURE: 97.4 F | WEIGHT: 310 LBS | BODY MASS INDEX: 48.65 KG/M2 | HEART RATE: 66 BPM | DIASTOLIC BLOOD PRESSURE: 71 MMHG | SYSTOLIC BLOOD PRESSURE: 98 MMHG

## 2025-07-01 DIAGNOSIS — G44.89 OTHER HEADACHE SYNDROME: Primary | ICD-10-CM

## 2025-07-01 DIAGNOSIS — G25.0 BENIGN ESSENTIAL TREMOR: ICD-10-CM

## 2025-07-01 PROCEDURE — 99204 OFFICE O/P NEW MOD 45 MIN: CPT | Performed by: NURSE PRACTITIONER

## 2025-07-01 PROCEDURE — 3078F DIAST BP <80 MM HG: CPT | Performed by: NURSE PRACTITIONER

## 2025-07-01 PROCEDURE — 3074F SYST BP LT 130 MM HG: CPT | Performed by: NURSE PRACTITIONER

## 2025-07-01 ASSESSMENT — ENCOUNTER SYMPTOMS
NAUSEA: 0
SINUS PRESSURE: 0
COUGH: 0
SINUS PAIN: 0
VOMITING: 0
BACK PAIN: 0
DIARRHEA: 0
SHORTNESS OF BREATH: 0

## 2025-07-01 NOTE — PATIENT INSTRUCTIONS
MRI brain w/wo to look for any underlying causes due to your history of ICH in 2018. Central scheduling will call you to schedule the test. Please allow 24-48 hours for them to contact you. If you don't hear from them with that time frame please call them directly at [phone]. [facility] has the bigger opening--bore.  Keep a HA log. Start, stop, intensity, what you are doing. BP.

## 2025-07-09 ENCOUNTER — HOSPITAL ENCOUNTER (OUTPATIENT)
Age: 40
Discharge: HOME | End: 2025-07-12
Payer: COMMERCIAL

## 2025-07-09 DIAGNOSIS — G44.89 OTHER HEADACHE SYNDROME: ICD-10-CM

## 2025-07-09 PROCEDURE — 70553 MRI BRAIN STEM W/O & W/DYE: CPT

## 2025-07-09 PROCEDURE — 6360000004 HC RX CONTRAST MEDICATION: Performed by: NURSE PRACTITIONER

## 2025-07-09 PROCEDURE — A9577 INJ MULTIHANCE: HCPCS | Performed by: NURSE PRACTITIONER

## 2025-07-09 RX ADMIN — GADOBENATE DIMEGLUMINE 20 ML: 529 INJECTION, SOLUTION INTRAVENOUS at 18:41

## 2025-07-22 ASSESSMENT — SLEEP AND FATIGUE QUESTIONNAIRES
HOW LIKELY ARE YOU TO NOD OFF OR FALL ASLEEP WHILE WATCHING TV: SLIGHT CHANCE OF DOZING
HOW LIKELY ARE YOU TO NOD OFF OR FALL ASLEEP IN A CAR, WHILE STOPPED FOR A FEW MINUTES IN TRAFFIC: WOULD NEVER DOZE
HOW LIKELY ARE YOU TO NOD OFF OR FALL ASLEEP WHEN YOU ARE A PASSENGER IN A CAR FOR AN HOUR WITHOUT A BREAK: SLIGHT CHANCE OF DOZING
HOW LIKELY ARE YOU TO NOD OFF OR FALL ASLEEP WHILE LYING DOWN TO REST IN THE AFTERNOON WHEN CIRCUMSTANCES PERMIT: MODERATE CHANCE OF DOZING
HOW LIKELY ARE YOU TO NOD OFF OR FALL ASLEEP WHILE SITTING AND READING: SLIGHT CHANCE OF DOZING
ESS TOTAL SCORE: 5
HOW LIKELY ARE YOU TO NOD OFF OR FALL ASLEEP WHILE WATCHING TV: SLIGHT CHANCE OF DOZING
HOW LIKELY ARE YOU TO NOD OFF OR FALL ASLEEP WHILE LYING DOWN TO REST IN THE AFTERNOON WHEN CIRCUMSTANCES PERMIT: MODERATE CHANCE OF DOZING
HOW LIKELY ARE YOU TO NOD OFF OR FALL ASLEEP WHILE SITTING AND TALKING TO SOMEONE: WOULD NEVER DOZE
HOW LIKELY ARE YOU TO NOD OFF OR FALL ASLEEP WHILE SITTING AND TALKING TO SOMEONE: WOULD NEVER DOZE
HOW LIKELY ARE YOU TO NOD OFF OR FALL ASLEEP WHILE SITTING INACTIVE IN A PUBLIC PLACE: WOULD NEVER DOZE
HOW LIKELY ARE YOU TO NOD OFF OR FALL ASLEEP WHILE SITTING QUIETLY AFTER LUNCH WITHOUT ALCOHOL: WOULD NEVER DOZE
HOW LIKELY ARE YOU TO NOD OFF OR FALL ASLEEP WHILE SITTING INACTIVE IN A PUBLIC PLACE: WOULD NEVER DOZE
HOW LIKELY ARE YOU TO NOD OFF OR FALL ASLEEP WHILE SITTING QUIETLY AFTER LUNCH WITHOUT ALCOHOL: WOULD NEVER DOZE
HOW LIKELY ARE YOU TO NOD OFF OR FALL ASLEEP WHILE SITTING AND READING: SLIGHT CHANCE OF DOZING
HOW LIKELY ARE YOU TO NOD OFF OR FALL ASLEEP WHEN YOU ARE A PASSENGER IN A CAR FOR AN HOUR WITHOUT A BREAK: SLIGHT CHANCE OF DOZING
HOW LIKELY ARE YOU TO NOD OFF OR FALL ASLEEP IN A CAR, WHILE STOPPED FOR A FEW MINUTES IN TRAFFIC: WOULD NEVER DOZE

## 2025-07-24 ENCOUNTER — OFFICE VISIT (OUTPATIENT)
Age: 40
End: 2025-07-24
Payer: COMMERCIAL

## 2025-07-24 VITALS
SYSTOLIC BLOOD PRESSURE: 107 MMHG | HEART RATE: 74 BPM | HEIGHT: 67 IN | OXYGEN SATURATION: 94 % | BODY MASS INDEX: 47.87 KG/M2 | TEMPERATURE: 97.4 F | WEIGHT: 305 LBS | DIASTOLIC BLOOD PRESSURE: 67 MMHG

## 2025-07-24 DIAGNOSIS — G43.109 MIGRAINE WITH AURA AND WITHOUT STATUS MIGRAINOSUS, NOT INTRACTABLE: Primary | ICD-10-CM

## 2025-07-24 PROCEDURE — 99214 OFFICE O/P EST MOD 30 MIN: CPT | Performed by: NURSE PRACTITIONER

## 2025-07-24 PROCEDURE — 3074F SYST BP LT 130 MM HG: CPT | Performed by: NURSE PRACTITIONER

## 2025-07-24 PROCEDURE — 3078F DIAST BP <80 MM HG: CPT | Performed by: NURSE PRACTITIONER

## 2025-07-24 NOTE — PROGRESS NOTES
for cold intolerance and heat intolerance.   Genitourinary:  Negative for difficulty urinating and urgency.   Musculoskeletal: Negative.  Negative for back pain, gait problem and neck pain.   Neurological:  Positive for tremors (slight right hand) and headaches. Negative for dizziness, seizures, syncope, facial asymmetry, speech difficulty, weakness, light-headedness and numbness.   Hematological: Negative.    Psychiatric/Behavioral:  Positive for sleep disturbance (on CPAP, doing better). Negative for decreased concentration. The patient is not nervous/anxious.       10 pt ros done and negative other than what is mentioned in HPI      PHYSICAL EXAM:  Physical Exam  Constitutional:       Appearance: Normal appearance. He is obese.   HENT:      Head: Normocephalic.   Eyes:      General: No visual field deficit.     Extraocular Movements: Extraocular movements intact.      Pupils: Pupils are equal, round, and reactive to light.   Musculoskeletal:         General: Normal range of motion.      Cervical back: Normal range of motion.   Skin:     General: Skin is warm and dry.   Neurological:      General: No focal deficit present.      Mental Status: He is alert and oriented to person, place, and time.      GCS: GCS eye subscore is 4. GCS verbal subscore is 5. GCS motor subscore is 6.      Cranial Nerves: No cranial nerve deficit, dysarthria or facial asymmetry.      Sensory: Sensation is intact. No sensory deficit.      Motor: Motor function is intact. No weakness, tremor, seizure activity or pronator drift.      Coordination: Coordination is intact. Coordination normal.      Gait: Gait normal.   Psychiatric:         Attention and Perception: Attention and perception normal.         Mood and Affect: Mood and affect normal.         Behavior: Behavior normal. Behavior is cooperative.         Thought Content: Thought content normal.         Cognition and Memory: Cognition and memory normal.         Judgment: Judgment 
- - -
No

## 2025-07-24 NOTE — PATIENT INSTRUCTIONS
B12 and folate lab. Take slip to [facility]. Our goal is for B12 to be 400 or higher.   For migraine prevention: magnesium glycinate, t  200 mg twice daily or 400 mg daily, and/or vitamin B2/riboflavin 400 mg daily.    You can safely take Tylenol Arthritis or Tylenol or Excedrin Migraine (caffeine is in this). No more than 2-3 days per week. Don't do Goodys or BC.

## 2025-07-25 ENCOUNTER — OFFICE VISIT (OUTPATIENT)
Age: 40
End: 2025-07-25
Payer: COMMERCIAL

## 2025-07-25 VITALS
HEART RATE: 86 BPM | OXYGEN SATURATION: 98 % | HEIGHT: 67 IN | DIASTOLIC BLOOD PRESSURE: 61 MMHG | RESPIRATION RATE: 18 BRPM | BODY MASS INDEX: 48.5 KG/M2 | TEMPERATURE: 97.9 F | WEIGHT: 309 LBS | SYSTOLIC BLOOD PRESSURE: 91 MMHG

## 2025-07-25 DIAGNOSIS — I10 PRIMARY HYPERTENSION: ICD-10-CM

## 2025-07-25 DIAGNOSIS — G47.33 OSA (OBSTRUCTIVE SLEEP APNEA): Primary | ICD-10-CM

## 2025-07-25 PROCEDURE — 3074F SYST BP LT 130 MM HG: CPT | Performed by: OTOLARYNGOLOGY

## 2025-07-25 PROCEDURE — 99214 OFFICE O/P EST MOD 30 MIN: CPT | Performed by: OTOLARYNGOLOGY

## 2025-07-25 PROCEDURE — 3078F DIAST BP <80 MM HG: CPT | Performed by: OTOLARYNGOLOGY

## 2025-07-25 ASSESSMENT — PATIENT HEALTH QUESTIONNAIRE - PHQ9
SUM OF ALL RESPONSES TO PHQ QUESTIONS 1-9: 0
2. FEELING DOWN, DEPRESSED OR HOPELESS: NOT AT ALL
SUM OF ALL RESPONSES TO PHQ QUESTIONS 1-9: 0
1. LITTLE INTEREST OR PLEASURE IN DOING THINGS: NOT AT ALL
SUM OF ALL RESPONSES TO PHQ QUESTIONS 1-9: 0
SUM OF ALL RESPONSES TO PHQ QUESTIONS 1-9: 0

## 2025-07-25 NOTE — PATIENT INSTRUCTIONS
Please make a follow up appointment to discuss the results of your sleep study or I will send you a \"my chart\" message with your results and treatment options.     The [facility] Sleep Lab is located in the [facility], adjacent to [facility]. The lab is on the second floor. The direct number to call for sleep study related questions is: [phone].    Please call our clinic back at [phone] or send a message on [employer] if you have any questions or concerns or if you are experiencing any of the following:     You have not received a follow up appointment within 30 days prior the recommended follow up time.    If you are not tolerating treatment plan and/or not able to obtain equipment or prescribed medication(s).  if you are experiencing any difficulties with the Durable Medical Equipment  (DME) Company you may be using or is assigned to you.  Two weeks have passed and you have not received an appointment for a scheduled procedure.  Two weeks have passed since you underwent a test and/or procedure and you have not received your results.  Your  Durable Medical Equipment (DME ) company is supposed to provide you with replacement filters, tubing and masks. You can either call your DME when you need new supplies or you can arrange for an automatic shipment schedule.    Your need to be seen by our office at lat minimum of every 12 months in order to renew the prescription for these supplies.   Please make note of who your DME company is and their phone number.   Please make sure that you clean your mask and hosing on a regular basis.  Your DME can provide you with additional information regarding proper care and cleaning of your device     Safety is strongly encouraged.  You should not drive if sleepy, tired, distracted and/or fatigued.      Chest Xrays and blood work do not require appointments.  They are considered \"Walk-In\" services and can be obtained at either [facility] or [facility]

## 2025-07-25 NOTE — PROGRESS NOTES
Gerber Jang presents today for   Chief Complaint   Patient presents with    Follow-up     CPAP.       Is someone accompanying this pt? no    Is the patient using any DME equipment during OV? no    -DME Company: [employer].     Depression Screenin/25/2025     3:23 PM   PHQ-9    Little interest or pleasure in doing things 0   Feeling down, depressed, or hopeless 0   PHQ-2 Score 0   PHQ-9 Total Score 0        [place] Sleepiness Scale:      2025     9:35 AM   Sleep Medicine   Sitting and reading 1   Watching TV 1   Sitting, inactive in a public place (e.g. a theatre or a meeting) 0   As a passenger in a car for an hour without a break 1   Lying down to rest in the afternoon when circumstances permit 2   Sitting and talking to someone 0   Sitting quietly after a lunch without alcohol 0   In a car, while stopped for a few minutes in traffic 0   [place] Sleepiness Score 5        Patient-reported       Stop-Ban/16/2025    11:00 AM   STOP-BANG QUESTIONNAIRE   Are you a loud and/or regular snorer? 1   Do you often feel tired or groggy upon awakening or do you awaken with a headache? 1   Have you been observed to gasp or stop breathing during sleep? 1   Are you often tired or fatigued during wake time hours?  0   Do you fall asleep sitting, reading, watching TV or driving? 0   Do you often have problems with memory or concentration? 0   Do you have or are you being treated for high blood pressure? 1   Recent BMI (Calculated) 49.4   Is BMI greater than 35 kg/m2? 1=Yes   Age older than 50 years old? 0=No   Is your neck circumference greater than 17 inches (Male) or 16 inches (Female)? 0   Gender - Male 1=Yes   STOP-Bang Total Score 55.4           Coordination of Care:  1. Have you been to the ER, urgent care clinic since your last visit? Hospitalized since your last visit?  no    2. Have you seen or consulted any other health care providers outside of the [facility] since your last visit? Include 
Classification:   []1 []2 []3 [x]4      Data reviewed:    Hemoglobin A1C   Date Value Ref Range Status   06/04/2025 6.1 (H) 4.2 - 5.6 % Final     Comment:     Reference Range  Normal       <5.7%  Prediabetes  5.7-6.4%  Diabetes     >6.4%              Lab Results   Component Value Date/Time     06/04/2025 09:01 AM    K 5.1 06/04/2025 09:01 AM     06/04/2025 09:01 AM    CO2 23 06/04/2025 09:01 AM    BUN 18 06/04/2025 09:01 AM    CREATININE 0.98 06/04/2025 09:01 AM    GLUCOSE 105 06/04/2025 09:01 AM    CALCIUM 10.2 06/04/2025 09:01 AM    LABGLOM >90 06/04/2025 09:01 AM    LABGLOM >60 11/14/2023 09:53 AM       Historical Sleep Testing Data:   Watch pat 3/20/25  Diagnosis: Obstructive Sleep Apnea - Severe - AHI 46.7 (3%). REM AHI 44.8. O2 viji - 84%. Time below 88% - 3.1 minutes. Total sleep time - 6:24.    Positive Airway Pressure (PAP) Compliance  Report & Summary Trends  DME: [employer]  6/11/25  Date >4 hr use % Time  (Total Time%) AHI PAP Rx Pressure @ 95% Median Use Time Leak-Median  (95%) Notes   6/11/25-7/23/25 91% 1.1 6-16 cwp 13.4 5:46 0.7                                     This note was dictated utilizing voice recognition software which may lead to typographical errors.  I apologize in advance if the situation occurs.  If questions arise please do not hesitate to contact me or call our department.    Electronically signed by Tracy Knowles DO on7/25/2025 at 3:37 PM

## 2025-08-11 ENCOUNTER — CLINICAL DOCUMENTATION (OUTPATIENT)
Age: 40
End: 2025-08-11